# Patient Record
Sex: FEMALE | Race: WHITE | Employment: UNEMPLOYED | URBAN - METROPOLITAN AREA
[De-identification: names, ages, dates, MRNs, and addresses within clinical notes are randomized per-mention and may not be internally consistent; named-entity substitution may affect disease eponyms.]

---

## 2020-11-11 ENCOUNTER — TELEMEDICINE (OUTPATIENT)
Dept: INTERNAL MEDICINE CLINIC | Facility: CLINIC | Age: 35
End: 2020-11-11
Payer: COMMERCIAL

## 2020-11-11 DIAGNOSIS — J02.9 SORE THROAT: Primary | ICD-10-CM

## 2020-11-11 DIAGNOSIS — Z20.822 SUSPECTED COVID-19 VIRUS INFECTION: ICD-10-CM

## 2020-11-11 PROCEDURE — 99202 OFFICE O/P NEW SF 15 MIN: CPT | Performed by: INTERNAL MEDICINE

## 2020-11-11 NOTE — PROGRESS NOTES
Telehealth outside of [address] Verbal Consent     I conducted a telehealth visit with Yesika Hi today, 11/11/20, which was completed using two-way, real-time interactive audio and video communication.  This has been done in good katie to • Cough      Yes [x]     No []     Dry []     Productive []    • Dyspnea    Yes []   No [x]   Wheezing []   • Fatigue:   Yes []     No [x]     • Myalgias: Yes []     No [x]     • Chills:  Yes []    No [x]       • Sore throat:    Yes [x]      No []      • throat  - SARS-COV-2 BY PCR (); Future    2. Suspected COVID-19 virus infection  - SARS-COV-2 BY PCR ();  Future  Plan  Low likelihood of Covid given minimal contacts and only a sore throat, she tested negative for strep this morning at the minute advised to call office with any questions or seek emergency care if necessary. Patient understands and agrees to follow directions and advice.       Cameron Tam MD  Internal Medicine    ----------------------------------------- PATIENT INSTRUCTIONS-----

## 2020-12-07 ENCOUNTER — TELEMEDICINE (OUTPATIENT)
Dept: INTERNAL MEDICINE CLINIC | Facility: CLINIC | Age: 35
End: 2020-12-07

## 2020-12-07 DIAGNOSIS — H04.129 DRY EYE: Primary | ICD-10-CM

## 2020-12-07 DIAGNOSIS — M25.571 PAIN IN JOINTS OF BOTH FEET: ICD-10-CM

## 2020-12-07 DIAGNOSIS — M25.572 PAIN IN JOINTS OF BOTH FEET: ICD-10-CM

## 2020-12-07 DIAGNOSIS — R68.2 DRY MOUTH: ICD-10-CM

## 2020-12-07 PROCEDURE — 99214 OFFICE O/P EST MOD 30 MIN: CPT | Performed by: INTERNAL MEDICINE

## 2020-12-07 NOTE — PROGRESS NOTES
Telemedicine Visit for Respiratory Illness - Potential COVID-19 Infection    Virtual/Telephone Check-In    Biancacorby Xiao verbally consents to a Telephone Check-In service on 12/07/20.  Patient understands and accepts financial responsibility for any ded JOSEPH (AUTOMATED); Future  - CYCLIC CITRULLINATE PEP. IGG; Future  - SJOGREN'S ANTI-SS-A; Future  - SJOGREN'S ANTI-SS-B; Future    2. Dry mouth  - CBC WITH DIFFERENTIAL WITH PLATELET; Future  - COMP METABOLIC PANEL (14);  Future  - ASSAY, THYROID STIM isolation/social distancing and symptomatic treatment as outlined on CDC Patient Guidelines.   Hayden Means MD

## 2021-01-06 ENCOUNTER — LAB ENCOUNTER (OUTPATIENT)
Dept: LAB | Age: 36
End: 2021-01-06
Attending: INTERNAL MEDICINE
Payer: COMMERCIAL

## 2021-01-06 ENCOUNTER — TELEPHONE (OUTPATIENT)
Dept: INTERNAL MEDICINE CLINIC | Facility: CLINIC | Age: 36
End: 2021-01-06

## 2021-01-06 DIAGNOSIS — H04.129 DRY EYE: ICD-10-CM

## 2021-01-06 DIAGNOSIS — M25.571 PAIN IN JOINTS OF BOTH FEET: ICD-10-CM

## 2021-01-06 DIAGNOSIS — Z13.220 SCREENING CHOLESTEROL LEVEL: ICD-10-CM

## 2021-01-06 DIAGNOSIS — M25.572 PAIN IN JOINTS OF BOTH FEET: ICD-10-CM

## 2021-01-06 DIAGNOSIS — R68.2 DRY MOUTH: ICD-10-CM

## 2021-01-06 DIAGNOSIS — Z13.220 SCREENING CHOLESTEROL LEVEL: Primary | ICD-10-CM

## 2021-01-06 LAB
ALBUMIN SERPL-MCNC: 3.6 G/DL (ref 3.4–5)
ALBUMIN/GLOB SERPL: 0.9 {RATIO} (ref 1–2)
ALP LIVER SERPL-CCNC: 43 U/L
ALT SERPL-CCNC: 21 U/L
ANION GAP SERPL CALC-SCNC: 5 MMOL/L (ref 0–18)
AST SERPL-CCNC: 14 U/L (ref 15–37)
BASOPHILS # BLD AUTO: 0.06 X10(3) UL (ref 0–0.2)
BASOPHILS NFR BLD AUTO: 1.1 %
BILIRUB SERPL-MCNC: 0.5 MG/DL (ref 0.1–2)
BUN BLD-MCNC: 10 MG/DL (ref 7–18)
BUN/CREAT SERPL: 12.2 (ref 10–20)
CALCIUM BLD-MCNC: 9 MG/DL (ref 8.5–10.1)
CHLORIDE SERPL-SCNC: 107 MMOL/L (ref 98–112)
CHOLEST SMN-MCNC: 179 MG/DL (ref ?–200)
CO2 SERPL-SCNC: 27 MMOL/L (ref 21–32)
CREAT BLD-MCNC: 0.82 MG/DL
CRP SERPL-MCNC: <0.29 MG/DL (ref ?–0.3)
DEPRECATED RDW RBC AUTO: 40.8 FL (ref 35.1–46.3)
EOSINOPHIL # BLD AUTO: 0.1 X10(3) UL (ref 0–0.7)
EOSINOPHIL NFR BLD AUTO: 1.9 %
ERYTHROCYTE [DISTWIDTH] IN BLOOD BY AUTOMATED COUNT: 11.6 % (ref 11–15)
ERYTHROCYTE [SEDIMENTATION RATE] IN BLOOD: 11 MM/HR
GLOBULIN PLAS-MCNC: 3.9 G/DL (ref 2.8–4.4)
GLUCOSE BLD-MCNC: 86 MG/DL (ref 70–99)
HCT VFR BLD AUTO: 39.9 %
HDLC SERPL-MCNC: 60 MG/DL (ref 40–59)
HGB BLD-MCNC: 13.4 G/DL
IMM GRANULOCYTES # BLD AUTO: 0 X10(3) UL (ref 0–1)
IMM GRANULOCYTES NFR BLD: 0 %
LDLC SERPL CALC-MCNC: 102 MG/DL (ref ?–100)
LYMPHOCYTES # BLD AUTO: 2.22 X10(3) UL (ref 1–4)
LYMPHOCYTES NFR BLD AUTO: 41.7 %
M PROTEIN MFR SERPL ELPH: 7.5 G/DL (ref 6.4–8.2)
MCH RBC QN AUTO: 32.2 PG (ref 26–34)
MCHC RBC AUTO-ENTMCNC: 33.6 G/DL (ref 31–37)
MCV RBC AUTO: 95.9 FL
MONOCYTES # BLD AUTO: 0.43 X10(3) UL (ref 0.1–1)
MONOCYTES NFR BLD AUTO: 8.1 %
NEUTROPHILS # BLD AUTO: 2.52 X10 (3) UL (ref 1.5–7.7)
NEUTROPHILS # BLD AUTO: 2.52 X10(3) UL (ref 1.5–7.7)
NEUTROPHILS NFR BLD AUTO: 47.2 %
NONHDLC SERPL-MCNC: 119 MG/DL (ref ?–130)
OSMOLALITY SERPL CALC.SUM OF ELEC: 286 MOSM/KG (ref 275–295)
PATIENT FASTING Y/N/NP: YES
PLATELET # BLD AUTO: 205 10(3)UL (ref 150–450)
POTASSIUM SERPL-SCNC: 3.9 MMOL/L (ref 3.5–5.1)
RBC # BLD AUTO: 4.16 X10(6)UL
RHEUMATOID FACT SERPL-ACNC: <10 IU/ML (ref ?–15)
SODIUM SERPL-SCNC: 139 MMOL/L (ref 136–145)
TRIGL SERPL-MCNC: 86 MG/DL (ref 30–149)
TSI SER-ACNC: 2.94 MIU/ML (ref 0.36–3.74)
VLDLC SERPL CALC-MCNC: 17 MG/DL (ref 0–30)
WBC # BLD AUTO: 5.3 X10(3) UL (ref 4–11)

## 2021-01-06 PROCEDURE — 86038 ANTINUCLEAR ANTIBODIES: CPT

## 2021-01-06 PROCEDURE — 80061 LIPID PANEL: CPT

## 2021-01-06 PROCEDURE — 86235 NUCLEAR ANTIGEN ANTIBODY: CPT

## 2021-01-06 PROCEDURE — 86140 C-REACTIVE PROTEIN: CPT

## 2021-01-06 PROCEDURE — 85652 RBC SED RATE AUTOMATED: CPT

## 2021-01-06 PROCEDURE — 85025 COMPLETE CBC W/AUTO DIFF WBC: CPT

## 2021-01-06 PROCEDURE — 36415 COLL VENOUS BLD VENIPUNCTURE: CPT

## 2021-01-06 PROCEDURE — 86431 RHEUMATOID FACTOR QUANT: CPT

## 2021-01-06 PROCEDURE — 80053 COMPREHEN METABOLIC PANEL: CPT

## 2021-01-06 PROCEDURE — 86200 CCP ANTIBODY: CPT

## 2021-01-06 PROCEDURE — 84443 ASSAY THYROID STIM HORMONE: CPT

## 2021-01-06 NOTE — TELEPHONE ENCOUNTER
Pt asking if she can have cholesterol panel added to her labs. Please advise.     Nurses-  Pt is having labs done today, please contact lab to add lipid panel to specimen if approved by MD.

## 2021-01-06 NOTE — TELEPHONE ENCOUNTER
Left message asking patient if she was fasting this morning when she had her labs.  If so we can call the lab and see if we can add the lipid panel

## 2021-01-07 LAB — NUCLEAR IGG TITR SER IF: NEGATIVE {TITER}

## 2021-01-11 ENCOUNTER — PATIENT MESSAGE (OUTPATIENT)
Dept: INTERNAL MEDICINE CLINIC | Facility: CLINIC | Age: 36
End: 2021-01-11

## 2021-01-12 LAB
CCP IGG SERPL-ACNC: 0.4 U/ML (ref 0–6.9)
ENA SS-A AB SER QL IA: NEGATIVE
ENA SS-B AB SER QL IA: NEGATIVE

## 2021-01-12 NOTE — TELEPHONE ENCOUNTER
From: Becka Pascual  To: Lisa Rodriguez MD  Sent: 1/11/2021 6:02 PM CST  Subject: Non-Urgent Medical Question    Hi Dr. Calvin Jefferson-  I saw your note and glad all is ok. I am however having a recurrence of GERD.  I've had issues off and on for years and it's

## 2021-01-19 ENCOUNTER — OFFICE VISIT (OUTPATIENT)
Dept: INTERNAL MEDICINE CLINIC | Facility: CLINIC | Age: 36
End: 2021-01-19
Payer: COMMERCIAL

## 2021-01-19 VITALS
DIASTOLIC BLOOD PRESSURE: 74 MMHG | SYSTOLIC BLOOD PRESSURE: 119 MMHG | WEIGHT: 129 LBS | BODY MASS INDEX: 20.73 KG/M2 | HEART RATE: 65 BPM | RESPIRATION RATE: 17 BRPM | HEIGHT: 66 IN

## 2021-01-19 DIAGNOSIS — Z00.00 PHYSICAL EXAM, ANNUAL: Primary | ICD-10-CM

## 2021-01-19 PROCEDURE — 3078F DIAST BP <80 MM HG: CPT | Performed by: INTERNAL MEDICINE

## 2021-01-19 PROCEDURE — 3008F BODY MASS INDEX DOCD: CPT | Performed by: INTERNAL MEDICINE

## 2021-01-19 PROCEDURE — 99395 PREV VISIT EST AGE 18-39: CPT | Performed by: INTERNAL MEDICINE

## 2021-01-19 PROCEDURE — 3074F SYST BP LT 130 MM HG: CPT | Performed by: INTERNAL MEDICINE

## 2021-01-19 RX ORDER — OMEPRAZOLE 20 MG/1
CAPSULE, DELAYED RELEASE ORAL
COMMUNITY
Start: 2021-01-13 | End: 2021-10-22 | Stop reason: ALTCHOICE

## 2021-01-19 NOTE — PROGRESS NOTES
Mark Alcantar is a 38year old female.   Patient presents with:  Physical      HPI:   Patient comes for physical  C/C physical  C/o has forms that need to be filled for her insurance  dry eyes from environmental - 10 yo son saw saw bertrand Dorchristianaea Son adult)   Pulse 65   Resp 17   Ht 5' 6\" (1.676 m)   Wt 129 lb (58.5 kg)   BMI 20.82 kg/m²   GENERAL: well developed, well nourished,in no apparent distress  SKIN: no rashes,no suspicious lesions  HEENT: atraumatic, normocephalic,ears and throat are clear,

## 2021-01-26 ENCOUNTER — PATIENT MESSAGE (OUTPATIENT)
Dept: INTERNAL MEDICINE CLINIC | Facility: CLINIC | Age: 36
End: 2021-01-26

## 2021-01-26 DIAGNOSIS — U07.1 COVID-19: Primary | ICD-10-CM

## 2021-01-26 NOTE — TELEPHONE ENCOUNTER
DR Dorsey=se message, pended order, please check if this is the right order and dx code before signing. From: Blue Lainez  To:  Carolyne Rondon MD  Sent: 1/26/2021 11:32 AM CST  Subject: Non-Urgent Medical Question    Hi Dr Dinora Mcdonnell,  Is there any wa

## 2021-02-01 ENCOUNTER — LAB ENCOUNTER (OUTPATIENT)
Dept: LAB | Age: 36
End: 2021-02-01
Attending: INTERNAL MEDICINE
Payer: COMMERCIAL

## 2021-02-01 DIAGNOSIS — U07.1 COVID-19: ICD-10-CM

## 2021-02-01 LAB — SARS-COV-2 IGG+IGM SERPL QL IA: NONREACTIVE

## 2021-02-01 PROCEDURE — 86769 SARS-COV-2 COVID-19 ANTIBODY: CPT

## 2021-02-01 PROCEDURE — 36415 COLL VENOUS BLD VENIPUNCTURE: CPT

## 2021-04-05 ENCOUNTER — PATIENT MESSAGE (OUTPATIENT)
Dept: INTERNAL MEDICINE CLINIC | Facility: CLINIC | Age: 36
End: 2021-04-05

## 2021-04-05 DIAGNOSIS — Z20.822 EXPOSURE TO COVID-19 VIRUS: Primary | ICD-10-CM

## 2021-04-05 NOTE — TELEPHONE ENCOUNTER
Call patient and left message–wanted to know if she had symptoms,   Ordered as asymptomatic and exposure to covid -- let her know the covid team will call her and she can request lisle if possible

## 2021-04-05 NOTE — TELEPHONE ENCOUNTER
Patient called stated would like order placed for Covid Testing dtg is (+)    Order pend for your approval

## 2021-06-23 NOTE — LETTER
11/16/2023      Hemant Das MD  Physical Medicine and Rehabilitation  [facility], [address] [place] [address]  Dept: [phone]  Dept Fax: [phone]        RE: Consultation for [facility]        Dear Bhumi Petty MD,    Thank you very much for the opportunity to see your patient. Attached please find a summary from your patient's recent visit. I appreciate the chance to take care of your patient with you. Please feel free to call me with any questions or concerns. Sincerely,        Dorean Fothergill.  MD Jeff  Electronically Signed on 11/16/2023 no

## 2021-08-10 ENCOUNTER — TELEMEDICINE (OUTPATIENT)
Dept: INTERNAL MEDICINE CLINIC | Facility: CLINIC | Age: 36
End: 2021-08-10
Payer: COMMERCIAL

## 2021-08-10 DIAGNOSIS — F41.9 ANXIETY AND DEPRESSION: ICD-10-CM

## 2021-08-10 DIAGNOSIS — F32.A ANXIETY AND DEPRESSION: ICD-10-CM

## 2021-08-10 DIAGNOSIS — D22.9 BENIGN MOLE: ICD-10-CM

## 2021-08-10 DIAGNOSIS — R53.83 FATIGUE, UNSPECIFIED TYPE: Primary | ICD-10-CM

## 2021-08-10 PROCEDURE — 99213 OFFICE O/P EST LOW 20 MIN: CPT | Performed by: INTERNAL MEDICINE

## 2021-08-10 NOTE — PROGRESS NOTES
Patient ID: Seth Brock is a 39year old female. No chief complaint on file. HISTORY OF PRESENT ILLNESS:   Patient presents for above. This visit is conducted using Telemedicine with live, interactive video and audio.   C/c \" brain fog \" Never Smoker      Smokeless tobacco: Never Used      Tobacco comment: social in college     Vaping Use      Vaping Use: Never used    Substance and Sexual Activity      Alcohol use: Never      Drug use: Never      Sexual activity: Not on file    Other Topi ASSAY, THYROID STIM HORMONE; Future  -  NAVIGATOR  +  Anxiety >> depression  She is very sensitive so we will hold off on medications–refer to [facility] at least to speak to a therapist  Did have very long discussion with patient and verbally agreed to telehealth consent form, related consents and the risks discussed. Lastly, the patient confirmed that [place] in [place].    Included in this visit, time may have been spent reviewing labs, medications, radiology tests and decision

## 2021-08-18 ENCOUNTER — LAB ENCOUNTER (OUTPATIENT)
Dept: LAB | Facility: REFERENCE LAB | Age: 36
End: 2021-08-18
Attending: INTERNAL MEDICINE
Payer: COMMERCIAL

## 2021-08-18 DIAGNOSIS — R53.83 FATIGUE, UNSPECIFIED TYPE: ICD-10-CM

## 2021-08-18 DIAGNOSIS — F41.9 ANXIETY AND DEPRESSION: ICD-10-CM

## 2021-08-18 DIAGNOSIS — F32.A ANXIETY AND DEPRESSION: ICD-10-CM

## 2021-08-18 LAB
ALBUMIN SERPL-MCNC: 3.7 G/DL (ref 3.4–5)
ALBUMIN/GLOB SERPL: 1 {RATIO} (ref 1–2)
ALP LIVER SERPL-CCNC: 45 U/L
ALT SERPL-CCNC: 21 U/L
ANION GAP SERPL CALC-SCNC: 3 MMOL/L (ref 0–18)
AST SERPL-CCNC: 11 U/L (ref 15–37)
BILIRUB SERPL-MCNC: 0.4 MG/DL (ref 0.1–2)
BUN BLD-MCNC: 10 MG/DL (ref 7–18)
BUN/CREAT SERPL: 15.4 (ref 10–20)
CALCIUM BLD-MCNC: 8.8 MG/DL (ref 8.5–10.1)
CHLORIDE SERPL-SCNC: 106 MMOL/L (ref 98–112)
CO2 SERPL-SCNC: 29 MMOL/L (ref 21–32)
CREAT BLD-MCNC: 0.65 MG/DL
DEPRECATED RDW RBC AUTO: 40 FL (ref 35.1–46.3)
ERYTHROCYTE [DISTWIDTH] IN BLOOD BY AUTOMATED COUNT: 11.2 % (ref 11–15)
GLOBULIN PLAS-MCNC: 3.8 G/DL (ref 2.8–4.4)
GLUCOSE BLD-MCNC: 100 MG/DL (ref 70–99)
HCT VFR BLD AUTO: 40.1 %
HGB BLD-MCNC: 13.5 G/DL
M PROTEIN MFR SERPL ELPH: 7.5 G/DL (ref 6.4–8.2)
MCH RBC QN AUTO: 32.6 PG (ref 26–34)
MCHC RBC AUTO-ENTMCNC: 33.7 G/DL (ref 31–37)
MCV RBC AUTO: 96.9 FL
OSMOLALITY SERPL CALC.SUM OF ELEC: 285 MOSM/KG (ref 275–295)
PATIENT FASTING Y/N/NP: NO
PLATELET # BLD AUTO: 197 10(3)UL (ref 150–450)
POTASSIUM SERPL-SCNC: 3.8 MMOL/L (ref 3.5–5.1)
RBC # BLD AUTO: 4.14 X10(6)UL
SODIUM SERPL-SCNC: 138 MMOL/L (ref 136–145)
TSI SER-ACNC: 1.77 MIU/ML (ref 0.36–3.74)
WBC # BLD AUTO: 6.2 X10(3) UL (ref 4–11)

## 2021-08-18 PROCEDURE — 36415 COLL VENOUS BLD VENIPUNCTURE: CPT

## 2021-08-18 PROCEDURE — 80053 COMPREHEN METABOLIC PANEL: CPT

## 2021-08-18 PROCEDURE — 85027 COMPLETE CBC AUTOMATED: CPT

## 2021-08-18 PROCEDURE — 84443 ASSAY THYROID STIM HORMONE: CPT

## 2021-09-20 ENCOUNTER — PATIENT MESSAGE (OUTPATIENT)
Dept: INTERNAL MEDICINE CLINIC | Facility: CLINIC | Age: 36
End: 2021-09-20

## 2021-09-20 DIAGNOSIS — Z91.89 AT INCREASED RISK OF EXPOSURE TO COVID-19 VIRUS: Primary | ICD-10-CM

## 2021-09-21 NOTE — TELEPHONE ENCOUNTER
Signed pended order but if she has received the Covid vaccine then it could be reactive because of that  She should still check with her insurance to see if this is covered

## 2021-10-21 ENCOUNTER — NURSE TRIAGE (OUTPATIENT)
Dept: INTERNAL MEDICINE CLINIC | Facility: CLINIC | Age: 36
End: 2021-10-21

## 2021-10-21 NOTE — TELEPHONE ENCOUNTER
Action Requested: Summary for Provider     []  Critical Lab, Recommendations Needed  [] Need Additional Advice  []   FYI    []   Need Orders  [] Need Medications Sent to Pharmacy  []  Other       SUMMARY: Per protocol office visit scheduled    Future Appoi

## 2021-10-21 NOTE — TELEPHONE ENCOUNTER
Patient scheduled an appointment online. Please see below     Visit Type: [employer] VIDEO VISIT (4145)      10/26/2021   1:30 PM  20 mins. Ana Valles MD         [facility]      Patient Comments:   Persistent left side abdominal pain.  3-4 months

## 2021-10-22 ENCOUNTER — OFFICE VISIT (OUTPATIENT)
Dept: FAMILY MEDICINE CLINIC | Facility: CLINIC | Age: 36
End: 2021-10-22
Payer: COMMERCIAL

## 2021-10-22 VITALS
DIASTOLIC BLOOD PRESSURE: 69 MMHG | SYSTOLIC BLOOD PRESSURE: 104 MMHG | BODY MASS INDEX: 20.82 KG/M2 | HEIGHT: 65.6 IN | RESPIRATION RATE: 18 BRPM | HEART RATE: 75 BPM | WEIGHT: 128 LBS | TEMPERATURE: 98 F

## 2021-10-22 DIAGNOSIS — Z91.018 FOOD ALLERGY: Primary | ICD-10-CM

## 2021-10-22 DIAGNOSIS — R10.32 LEFT LOWER QUADRANT ABDOMINAL PAIN: ICD-10-CM

## 2021-10-22 PROCEDURE — 3008F BODY MASS INDEX DOCD: CPT | Performed by: NURSE PRACTITIONER

## 2021-10-22 PROCEDURE — 3074F SYST BP LT 130 MM HG: CPT | Performed by: NURSE PRACTITIONER

## 2021-10-22 PROCEDURE — 99213 OFFICE O/P EST LOW 20 MIN: CPT | Performed by: NURSE PRACTITIONER

## 2021-10-22 PROCEDURE — 3078F DIAST BP <80 MM HG: CPT | Performed by: NURSE PRACTITIONER

## 2021-10-22 NOTE — PROGRESS NOTES
Raj Jacobs is a 39year old female. Patient presents with:  Abdominal Pain: upper mid stomach pain    HPI:   Patient presents to the clinic with mid left abdominal pain.  Patient states she has had the pain for years but now it is becoming more freque Musculoskeletal: Negative. Skin: Negative. Neurological: Negative. Psychiatric/Behavioral: Negative. Wt Readings from Last 5 Encounters:  10/22/21 : 128 lb (58.1 kg)  01/19/21 : 129 lb (58.5 kg)    Body mass index is 20.91 kg/m².       EXAM

## 2021-10-25 ENCOUNTER — TELEPHONE (OUTPATIENT)
Dept: CASE MANAGEMENT | Age: 36
End: 2021-10-25

## 2021-10-25 NOTE — TELEPHONE ENCOUNTER
Hello~    [facility] is out of network provider for pt to have CT. Please have pt reach out to health plan to verify who is in network.     Thank you,  [facility]  Referral specialist

## 2021-10-26 NOTE — TELEPHONE ENCOUNTER
Please contact patient and have them reach out to their insurance to verify who is in network for a CT scan.

## 2021-10-27 NOTE — TELEPHONE ENCOUNTER
Hello ~    Pt is still scheduled. Please reach out to patient to find out who is in network.     Thank you,    [facility]   Referral specialist

## 2021-10-30 ENCOUNTER — PATIENT MESSAGE (OUTPATIENT)
Dept: FAMILY MEDICINE CLINIC | Facility: CLINIC | Age: 36
End: 2021-10-30

## 2021-10-30 ENCOUNTER — HOSPITAL ENCOUNTER (OUTPATIENT)
Dept: CT IMAGING | Facility: HOSPITAL | Age: 36
Discharge: HOME | End: 2021-10-30
Attending: NURSE PRACTITIONER
Payer: COMMERCIAL

## 2021-10-30 DIAGNOSIS — N13.70 VESICOURETERAL REFLUX: ICD-10-CM

## 2021-10-30 DIAGNOSIS — R10.32 LEFT LOWER QUADRANT ABDOMINAL PAIN: ICD-10-CM

## 2021-10-30 DIAGNOSIS — K76.0 FATTY LIVER: Primary | ICD-10-CM

## 2021-10-30 PROCEDURE — 82565 ASSAY OF CREATININE: CPT

## 2021-10-30 PROCEDURE — 74177 CT ABD & PELVIS W/CONTRAST: CPT | Performed by: NURSE PRACTITIONER

## 2021-10-31 NOTE — TELEPHONE ENCOUNTER
From: Jones Yang  To: RAVINDRA Bro  Sent: 10/30/2021 3:33 PM CDT  Subject: CT Scan Results    Gerardo Bryant,  I did my CT scan today and received results.  They’re freaking me out a little as some ureter thickening etc was seen as well as a

## 2021-11-01 NOTE — TELEPHONE ENCOUNTER
Called ans spoke to pt at length  Ordered UA  Reviewed labs again from August  These are chronic issues that patient has been having and to try to reassure patient but she is concerned so will refer to urology, check liver ultrasound  Her dad does have kid

## 2021-11-08 ENCOUNTER — LAB ENCOUNTER (OUTPATIENT)
Dept: LAB | Age: 36
End: 2021-11-08
Attending: INTERNAL MEDICINE
Payer: COMMERCIAL

## 2021-11-08 DIAGNOSIS — N13.70 VESICOURETERAL REFLUX: ICD-10-CM

## 2021-11-08 PROCEDURE — 81003 URINALYSIS AUTO W/O SCOPE: CPT

## 2021-11-10 ENCOUNTER — OFFICE VISIT (OUTPATIENT)
Dept: ALLERGY | Facility: CLINIC | Age: 36
End: 2021-11-10
Payer: COMMERCIAL

## 2021-11-10 ENCOUNTER — NURSE ONLY (OUTPATIENT)
Dept: ALLERGY | Facility: CLINIC | Age: 36
End: 2021-11-10
Payer: COMMERCIAL

## 2021-11-10 VITALS
DIASTOLIC BLOOD PRESSURE: 74 MMHG | WEIGHT: 128 LBS | HEART RATE: 69 BPM | OXYGEN SATURATION: 100 % | HEIGHT: 65 IN | BODY MASS INDEX: 21.33 KG/M2 | SYSTOLIC BLOOD PRESSURE: 107 MMHG

## 2021-11-10 DIAGNOSIS — Z91.018 FOOD ALLERGY: Primary | ICD-10-CM

## 2021-11-10 DIAGNOSIS — R10.30 LOWER ABDOMINAL PAIN: ICD-10-CM

## 2021-11-10 DIAGNOSIS — K90.49 ALCOHOL INTOLERANCE: ICD-10-CM

## 2021-11-10 DIAGNOSIS — J30.1 SEASONAL ALLERGIC RHINITIS DUE TO POLLEN: ICD-10-CM

## 2021-11-10 DIAGNOSIS — Z91.018 FOOD ALLERGY: ICD-10-CM

## 2021-11-10 PROCEDURE — 3008F BODY MASS INDEX DOCD: CPT | Performed by: ALLERGY & IMMUNOLOGY

## 2021-11-10 PROCEDURE — 3078F DIAST BP <80 MM HG: CPT | Performed by: ALLERGY & IMMUNOLOGY

## 2021-11-10 PROCEDURE — 99244 OFF/OP CNSLTJ NEW/EST MOD 40: CPT | Performed by: ALLERGY & IMMUNOLOGY

## 2021-11-10 PROCEDURE — 3074F SYST BP LT 130 MM HG: CPT | Performed by: ALLERGY & IMMUNOLOGY

## 2021-11-10 PROCEDURE — 95004 PERQ TESTS W/ALRGNC XTRCS: CPT | Performed by: ALLERGY & IMMUNOLOGY

## 2021-11-10 RX ORDER — LEVOCETIRIZINE DIHYDROCHLORIDE 5 MG/1
5 TABLET, FILM COATED ORAL NIGHTLY
Qty: 30 TABLET | Refills: 0 | Status: SHIPPED | OUTPATIENT
Start: 2021-11-10

## 2021-11-10 NOTE — PATIENT INSTRUCTIONS
1.  Adverse food reaction  Food allergy versus food intolerance. Symptoms can be triggered with apples and bananas and almond butter 3 to 4 hours after ingestion. More GI issues including abdominal pain bloating gas. No diarrhea no emesis.   Patient has

## 2021-11-10 NOTE — PROGRESS NOTES
Blue Lainez is a 39year old female. HPI:   Patient presents with: Other: patient presents with left sided abdominal pain , thinks eating apples and bananas are causing the pain and bloating, denies hives when eating said fruit.  patient states sh ad, or food allergy:   Hx of eib    + AR: seasonal. Allegra+/- d prn     covid vaccin utd x 2 doses  Flu vaccine this fall:  Yes       Dad with barretts         CT of the abdomen on October 30, 2021 per report  Impression   CONCLUSION:   1.  Nonspecific ur Reaction: rash  Sulfamethoxazole W/*    HIVES  Kiwi Extract            ITCHING      ROS:     Allergic/Immuno:  See HPI  Cardiovascular:  Negative for irregular heartbeat/palpitations, chest pain, edema  Constitutional:  Negative night sweats,weight loss, i Allmond walnut peanut barley rye hops apples and bananas was negative     Patient with positive response to histamine control     1. Adverse food reaction  Food allergy versus food intolerance.   Symptoms can be triggered with apples and bananas and almond report         Orders This Visit:  Orders Placed This Encounter      CELIAC DISEASE SCREEN Reflex      Meds This Visit:  Requested Prescriptions      No prescriptions requested or ordered in this encounter       Imaging & Referrals:  None     11/10/2021  J

## 2021-11-18 ENCOUNTER — MED REC SCAN ONLY (OUTPATIENT)
Dept: INTERNAL MEDICINE CLINIC | Facility: CLINIC | Age: 36
End: 2021-11-18

## 2021-11-18 ENCOUNTER — OFFICE VISIT (OUTPATIENT)
Dept: SURGERY | Facility: CLINIC | Age: 36
End: 2021-11-18
Payer: COMMERCIAL

## 2021-11-18 VITALS
BODY MASS INDEX: 21.33 KG/M2 | HEIGHT: 65 IN | WEIGHT: 128 LBS | DIASTOLIC BLOOD PRESSURE: 77 MMHG | SYSTOLIC BLOOD PRESSURE: 120 MMHG | HEART RATE: 68 BPM

## 2021-11-18 DIAGNOSIS — N13.4 HYDROURETER ON LEFT: Primary | ICD-10-CM

## 2021-11-18 PROCEDURE — 3008F BODY MASS INDEX DOCD: CPT | Performed by: UROLOGY

## 2021-11-18 PROCEDURE — 3074F SYST BP LT 130 MM HG: CPT | Performed by: UROLOGY

## 2021-11-18 PROCEDURE — 99244 OFF/OP CNSLTJ NEW/EST MOD 40: CPT | Performed by: UROLOGY

## 2021-11-18 PROCEDURE — 3078F DIAST BP <80 MM HG: CPT | Performed by: UROLOGY

## 2021-11-21 ENCOUNTER — IMMUNIZATION (OUTPATIENT)
Dept: LAB | Facility: HOSPITAL | Age: 36
End: 2021-11-21
Attending: EMERGENCY MEDICINE
Payer: COMMERCIAL

## 2021-11-21 DIAGNOSIS — Z23 NEED FOR VACCINATION: Primary | ICD-10-CM

## 2021-11-21 PROCEDURE — 0004A SARSCOV2 VAC 30MCG/0.3ML IM: CPT

## 2021-12-02 NOTE — H&P
[address] [facility] [address] Gastroenterology                                                                                                  Clinic History and Physical     Pa reveal any acute dilatation. CT urogram as ordered by urology revealed a probable nonspecific 3.5 feet hyperenhancement on the left hepatic lobe with follow-up MRI recommended.     The patient has followed up with Dr. Rajinder Castro [facility] hepatology) who she saw y HIVES, OTHER (SEE COMMENTS)    Comment:Cerner Allergy Text Annotation: sulfa drugs,             Cerner Reaction: rash  Sulfamethoxazole W/*    HIVES  Kiwi Extract            ITCHING    ROS:   CONSTITUTIONAL:  negative for fevers, rigors  EYES:  negative fo abdominal pain        1. Left-sided abdominal pain: The patient endorses a recent history of intermittent left-sided abdominal pain. There is relief if she avoids certain foods such as apples and bananas and/or passes gas/has a bowel movement.   CT scan h MiraLAX 1-2 scoops daily and titrate dose as needed  -Follow-up with new or ongoing GI symptoms      Orders This Visit:  No orders of the defined types were placed in this encounter.       Meds This Visit:  Requested Prescriptions      No prescriptions requ

## 2021-12-13 ENCOUNTER — HOSPITAL ENCOUNTER (OUTPATIENT)
Dept: CT IMAGING | Facility: HOSPITAL | Age: 36
Discharge: HOME | End: 2021-12-13
Attending: UROLOGY
Payer: COMMERCIAL

## 2021-12-13 DIAGNOSIS — N13.4 HYDROURETER ON LEFT: ICD-10-CM

## 2021-12-13 PROCEDURE — 74178 CT ABD&PLV WO CNTR FLWD CNTR: CPT | Performed by: UROLOGY

## 2021-12-13 PROCEDURE — 76377 3D RENDER W/INTRP POSTPROCES: CPT | Performed by: UROLOGY

## 2021-12-13 PROCEDURE — 82565 ASSAY OF CREATININE: CPT

## 2021-12-14 ENCOUNTER — OFFICE VISIT (OUTPATIENT)
Dept: SURGERY | Facility: CLINIC | Age: 36
End: 2021-12-14
Payer: COMMERCIAL

## 2021-12-14 DIAGNOSIS — N13.4 HYDROURETER ON LEFT: Primary | ICD-10-CM

## 2021-12-14 PROCEDURE — 99213 OFFICE O/P EST LOW 20 MIN: CPT | Performed by: UROLOGY

## 2021-12-14 RX ORDER — HYDROCORTISONE 25 MG/G
CREAM TOPICAL
COMMUNITY
Start: 2021-12-14

## 2021-12-14 NOTE — PROGRESS NOTES
Maryam Davalos is a 39year old female.     HPI:   Patient presents with:  Urinary Symptoms: pt is here 1 month follow up ct scan completed       14-year-old female in follow-up to previous visit November 18, 2021 for incidental left hydroureter detected diagnosis)    Reviewed findings at length with patient. Recommended follow-up in 4 months to recheck urine microscopically for blood. If negative and has no recurring symptoms can follow-up on a as needed basis subsequent to that.          Orders This Visit

## 2021-12-16 ENCOUNTER — OFFICE VISIT (OUTPATIENT)
Dept: GASTROENTEROLOGY | Facility: CLINIC | Age: 36
End: 2021-12-16
Payer: COMMERCIAL

## 2021-12-16 VITALS
BODY MASS INDEX: 21.16 KG/M2 | HEIGHT: 65 IN | TEMPERATURE: 99 F | HEART RATE: 77 BPM | DIASTOLIC BLOOD PRESSURE: 70 MMHG | SYSTOLIC BLOOD PRESSURE: 101 MMHG | WEIGHT: 127 LBS

## 2021-12-16 DIAGNOSIS — R10.9 LEFT SIDED ABDOMINAL PAIN: Primary | ICD-10-CM

## 2021-12-16 DIAGNOSIS — K59.09 CHRONIC CONSTIPATION: ICD-10-CM

## 2021-12-16 DIAGNOSIS — R93.5 ABNORMAL ABDOMINAL CT SCAN: ICD-10-CM

## 2021-12-16 PROCEDURE — 3074F SYST BP LT 130 MM HG: CPT | Performed by: NURSE PRACTITIONER

## 2021-12-16 PROCEDURE — 3078F DIAST BP <80 MM HG: CPT | Performed by: NURSE PRACTITIONER

## 2021-12-16 PROCEDURE — 3008F BODY MASS INDEX DOCD: CPT | Performed by: NURSE PRACTITIONER

## 2021-12-16 PROCEDURE — 99244 OFF/OP CNSLTJ NEW/EST MOD 40: CPT | Performed by: NURSE PRACTITIONER

## 2021-12-16 NOTE — PATIENT INSTRUCTIONS
-Keep up good water/fiber intake  -Start probiotics  -Start MiraLAX 1-2 scoops daily and titrate dose as needed  -Follow-up with new or ongoing GI symptoms

## 2022-01-21 ENCOUNTER — TELEMEDICINE (OUTPATIENT)
Dept: INTERNAL MEDICINE CLINIC | Facility: CLINIC | Age: 37
End: 2022-01-21

## 2022-01-21 ENCOUNTER — NURSE TRIAGE (OUTPATIENT)
Dept: INTERNAL MEDICINE CLINIC | Facility: CLINIC | Age: 37
End: 2022-01-21

## 2022-01-21 DIAGNOSIS — R05.9 COUGH: Primary | ICD-10-CM

## 2022-01-21 PROCEDURE — 99213 OFFICE O/P EST LOW 20 MIN: CPT | Performed by: INTERNAL MEDICINE

## 2022-01-21 RX ORDER — FLUTICASONE FUROATE AND VILANTEROL TRIFENATATE 100; 25 UG/1; UG/1
1 POWDER RESPIRATORY (INHALATION) DAILY
Qty: 1 EACH | Refills: 0 | Status: SHIPPED | OUTPATIENT
Start: 2022-01-21

## 2022-01-21 NOTE — TELEPHONE ENCOUNTER
Action Requested: Summary for Provider     []  Critical Lab, Recommendations Needed  [] Need Additional Advice  [x]   FYI    []   Need Orders  [] Need Medications Sent to Pharmacy  []  Other     SUMMARY:   Spoke with pt,  verified, pt started getting si

## 2022-01-24 NOTE — PROGRESS NOTES
Virtual Virtual Check-In    Araceli Dominguez verbally consents to a Virtual Video Check-In service on 01/24/22. Patient understands and accepts financial responsibility for any deductible, co-insurance and/or co-pays associated with this service.  This vis into the lungs daily. 1 each 0   • hydrocortisone 2.5 % External Cream  (Patient not taking: Reported on 12/16/2021)     • levocetirizine (XYZAL) 5 MG Oral Tab Take 1 tablet (5 mg total) by mouth nightly.  (Patient not taking: Reported on 12/16/2021) 30 tab emergency care. Patient advised to go to ER or call 911 for worsening symptoms or acute distress. This note was prepared using Vocab voice recognition dictation software. As a result errors may occur.  When identified these errors have been cor

## 2022-02-04 ENCOUNTER — TELEPHONE (OUTPATIENT)
Dept: INTERNAL MEDICINE CLINIC | Facility: CLINIC | Age: 37
End: 2022-02-04

## 2022-02-04 RX ORDER — BENZONATATE 100 MG/1
100 CAPSULE ORAL 2 TIMES DAILY PRN
Qty: 10 CAPSULE | Refills: 0 | Status: SHIPPED | OUTPATIENT
Start: 2022-02-04 | End: 2022-02-22 | Stop reason: ALTCHOICE

## 2022-02-04 NOTE — TELEPHONE ENCOUNTER
Patient states she is still coughing and when prescribed Breo she only took it for one day 1/21/22 and stopped. Patient reports she did take the Breo this morning. .    Patient states cough is dry denies any other s/sx. Patient scheduled an office visit for 2/22/22. Please advise.

## 2022-02-08 ENCOUNTER — TELEPHONE (OUTPATIENT)
Dept: INTERNAL MEDICINE CLINIC | Facility: CLINIC | Age: 37
End: 2022-02-08

## 2022-02-08 NOTE — TELEPHONE ENCOUNTER
Pharmacy asking for an alternative medication to [facility] RESPICLICK 90 mcg inhlr Inhale 2 puffs into the lings every 4 to 6 hours as needed qty 1.0    Could not recognize if that matched any of the current meds.

## 2022-02-09 RX ORDER — ALBUTEROL SULFATE 90 UG/1
2 AEROSOL, METERED RESPIRATORY (INHALATION) EVERY 6 HOURS PRN
Qty: 1 EACH | Refills: 0 | Status: SHIPPED | OUTPATIENT
Start: 2022-02-09 | End: 2022-02-22 | Stop reason: ALTCHOICE

## 2022-02-09 NOTE — TELEPHONE ENCOUNTER
proair is not covered. Albuterol is covered by insurance. Ok to change to albuterol? Please advise. Albuterol powder is not covered and is not preferred.

## 2022-02-22 ENCOUNTER — LAB ENCOUNTER (OUTPATIENT)
Dept: LAB | Age: 37
End: 2022-02-22
Attending: INTERNAL MEDICINE
Payer: COMMERCIAL

## 2022-02-22 ENCOUNTER — PATIENT MESSAGE (OUTPATIENT)
Dept: INTERNAL MEDICINE CLINIC | Facility: CLINIC | Age: 37
End: 2022-02-22

## 2022-02-22 ENCOUNTER — OFFICE VISIT (OUTPATIENT)
Dept: INTERNAL MEDICINE CLINIC | Facility: CLINIC | Age: 37
End: 2022-02-22
Payer: COMMERCIAL

## 2022-02-22 VITALS
HEIGHT: 65 IN | SYSTOLIC BLOOD PRESSURE: 105 MMHG | WEIGHT: 127.38 LBS | BODY MASS INDEX: 21.22 KG/M2 | HEART RATE: 93 BPM | DIASTOLIC BLOOD PRESSURE: 73 MMHG

## 2022-02-22 DIAGNOSIS — Z00.00 PHYSICAL EXAM, ANNUAL: ICD-10-CM

## 2022-02-22 DIAGNOSIS — Z00.00 PHYSICAL EXAM, ANNUAL: Primary | ICD-10-CM

## 2022-02-22 PROBLEM — F90.0 ATTENTION DEFICIT HYPERACTIVITY DISORDER (ADHD), PREDOMINANTLY INATTENTIVE TYPE: Status: ACTIVE | Noted: 2022-02-22

## 2022-02-22 LAB
BASOPHILS # BLD AUTO: 0.04 X10(3) UL (ref 0–0.2)
BASOPHILS NFR BLD AUTO: 0.8 %
CHOLEST SERPL-MCNC: 174 MG/DL (ref ?–200)
CRP SERPL-MCNC: <0.29 MG/DL (ref ?–0.3)
DEPRECATED RDW RBC AUTO: 41.5 FL (ref 35.1–46.3)
EOSINOPHIL # BLD AUTO: 0.06 X10(3) UL (ref 0–0.7)
EOSINOPHIL NFR BLD AUTO: 1.2 %
ERYTHROCYTE [DISTWIDTH] IN BLOOD BY AUTOMATED COUNT: 11.6 % (ref 11–15)
FASTING PATIENT LIPID ANSWER: YES
HCT VFR BLD AUTO: 41.3 %
HCYS SERPL-SCNC: 5.8 UMOL/L (ref 3.2–10.7)
HDLC SERPL-MCNC: 61 MG/DL (ref 40–59)
HGB BLD-MCNC: 13.8 G/DL
IMM GRANULOCYTES # BLD AUTO: 0.01 X10(3) UL (ref 0–1)
IMM GRANULOCYTES NFR BLD: 0.2 %
LDLC SERPL CALC-MCNC: 102 MG/DL (ref ?–100)
LYMPHOCYTES # BLD AUTO: 2.04 X10(3) UL (ref 1–4)
LYMPHOCYTES NFR BLD AUTO: 40.8 %
MCH RBC QN AUTO: 32.5 PG (ref 26–34)
MCHC RBC AUTO-ENTMCNC: 33.4 G/DL (ref 31–37)
MCV RBC AUTO: 97.2 FL
MONOCYTES # BLD AUTO: 0.43 X10(3) UL (ref 0.1–1)
MONOCYTES NFR BLD AUTO: 8.6 %
NEUTROPHILS # BLD AUTO: 2.42 X10 (3) UL (ref 1.5–7.7)
NEUTROPHILS # BLD AUTO: 2.42 X10(3) UL (ref 1.5–7.7)
NEUTROPHILS NFR BLD AUTO: 48.4 %
NONHDLC SERPL-MCNC: 113 MG/DL (ref ?–130)
PLATELET # BLD AUTO: 165 10(3)UL (ref 150–450)
RBC # BLD AUTO: 4.25 X10(6)UL
TRIGL SERPL-MCNC: 58 MG/DL (ref 30–149)
TSI SER-ACNC: 1.84 MIU/ML (ref 0.36–3.74)
VLDLC SERPL CALC-MCNC: 10 MG/DL (ref 0–30)
WBC # BLD AUTO: 5 X10(3) UL (ref 4–11)

## 2022-02-22 PROCEDURE — 3074F SYST BP LT 130 MM HG: CPT | Performed by: INTERNAL MEDICINE

## 2022-02-22 PROCEDURE — 83090 ASSAY OF HOMOCYSTEINE: CPT

## 2022-02-22 PROCEDURE — 99395 PREV VISIT EST AGE 18-39: CPT | Performed by: INTERNAL MEDICINE

## 2022-02-22 PROCEDURE — 85025 COMPLETE CBC W/AUTO DIFF WBC: CPT

## 2022-02-22 PROCEDURE — 86140 C-REACTIVE PROTEIN: CPT

## 2022-02-22 PROCEDURE — 84443 ASSAY THYROID STIM HORMONE: CPT

## 2022-02-22 PROCEDURE — 80061 LIPID PANEL: CPT

## 2022-02-22 PROCEDURE — 36415 COLL VENOUS BLD VENIPUNCTURE: CPT

## 2022-02-22 PROCEDURE — 3078F DIAST BP <80 MM HG: CPT | Performed by: INTERNAL MEDICINE

## 2022-02-22 PROCEDURE — 3008F BODY MASS INDEX DOCD: CPT | Performed by: INTERNAL MEDICINE

## 2022-02-22 RX ORDER — DEXMETHYLPHENIDATE HYDROCHLORIDE 5 MG/1
5 CAPSULE, EXTENDED RELEASE ORAL EVERY MORNING
COMMUNITY
Start: 2022-02-09

## 2022-02-24 ENCOUNTER — MED REC SCAN ONLY (OUTPATIENT)
Dept: INTERNAL MEDICINE CLINIC | Facility: CLINIC | Age: 37
End: 2022-02-24

## 2022-03-15 ENCOUNTER — TELEPHONE (OUTPATIENT)
Dept: ALLERGY | Facility: CLINIC | Age: 37
End: 2022-03-15

## 2022-03-15 NOTE — TELEPHONE ENCOUNTER
Labs from 11/10/2022 have not been completed. Letter sent home. Postponed x 2 months. Dr. Maxine Dickerson, if labs have not been completed in that time okay to cancel?

## 2022-05-19 ENCOUNTER — OFFICE VISIT (OUTPATIENT)
Dept: INTERNAL MEDICINE CLINIC | Facility: CLINIC | Age: 37
End: 2022-05-19
Payer: COMMERCIAL

## 2022-05-19 ENCOUNTER — NURSE TRIAGE (OUTPATIENT)
Dept: INTERNAL MEDICINE CLINIC | Facility: CLINIC | Age: 37
End: 2022-05-19

## 2022-05-19 VITALS
DIASTOLIC BLOOD PRESSURE: 78 MMHG | BODY MASS INDEX: 21.77 KG/M2 | SYSTOLIC BLOOD PRESSURE: 116 MMHG | HEART RATE: 87 BPM | HEIGHT: 65 IN | WEIGHT: 130.69 LBS

## 2022-05-19 DIAGNOSIS — R05.9 COUGH: Primary | ICD-10-CM

## 2022-05-19 PROCEDURE — 99213 OFFICE O/P EST LOW 20 MIN: CPT | Performed by: NURSE PRACTITIONER

## 2022-05-19 PROCEDURE — 3078F DIAST BP <80 MM HG: CPT | Performed by: NURSE PRACTITIONER

## 2022-05-19 PROCEDURE — 3074F SYST BP LT 130 MM HG: CPT | Performed by: NURSE PRACTITIONER

## 2022-05-19 PROCEDURE — 3008F BODY MASS INDEX DOCD: CPT | Performed by: NURSE PRACTITIONER

## 2022-05-19 RX ORDER — BENZONATATE 100 MG/1
100 CAPSULE ORAL 3 TIMES DAILY PRN
Qty: 20 CAPSULE | Refills: 0 | Status: SHIPPED | OUTPATIENT
Start: 2022-05-19 | End: 2022-05-26

## 2022-05-19 RX ORDER — ALBUTEROL SULFATE 90 UG/1
2 AEROSOL, METERED RESPIRATORY (INHALATION) EVERY 6 HOURS PRN
Qty: 1 EACH | Refills: 0 | Status: SHIPPED | OUTPATIENT
Start: 2022-05-19

## 2022-05-19 NOTE — PATIENT INSTRUCTIONS
Tylenol two tabs every six hours x 3 days. Not to exceed 4 grams in one day.    Take ibuprofen 400 mg every 8  Hours    Benzonatate 100mg every eight hours  Albuterol inhaler every six hours

## 2022-05-19 NOTE — TELEPHONE ENCOUNTER
Approved appt with Odin JACOBS; (per secure chat)    Patient was made appt 11am today for evaluation.

## 2022-05-19 NOTE — TELEPHONE ENCOUNTER
REYNALDO Han: can we book patient with you? Patient would like to get assessed and make sure she gets the correct treatment. She does not want to go to IC. Patient seeking in-office appt. PCP is not available. covid positive on 4/27/22. Had cold symptoms, (fever, cough, bodyaches)  After a week was getting better. Younger son had \" para influenza/croup\" last week. Last week Thursday started with low grade fever, sore thorat, bad swollen throat; she went to Immediate care and tested neg for strep. Respiratory panel done; all negative. Was still given ABX Teja Dunks; felt better Sunday. Friday 5/13/22 had negative Covid PCR test  Monday 5/16/22  Tested neg for Covid PCR (patient started with tickle dry cough again)    Tuesday 5/17/22 had surgery (breast implants replaced) no complications. Wednesday 5/18/22,by mid-day patient started again with coughing; unable to sleep last night. Now continues with cough, productive. Patient has pain from the incisions due to constant coughing. She takes advil for the pains.

## 2022-05-19 NOTE — ASSESSMENT & PLAN NOTE
18-year-old female I am meeting for the first time this complaining of a cough. She had COVID-19 3 weeks ago. She is having a lingering cough. Her entire family had COVID-23and her 3year-old developed parainfluenza 4. With a bilateral ear infection. She was placed on Z-Jose Ramon which she is taking now. She had breast implants exchanged 2 days ago which was a same-day surgery. Incisions look good no signs of infection. No fever, chills, body aches or feeling ill. At this time. Lungs are clear bilaterally with no wheezing. .  She had a Breo inhaler at home and was using that. She states that  To take medications. She was given tramadol for the pain but decided not to take that and is taking ibuprofen. Plan     Tylenol two tabs every six hours x 3 days. Not to exceed 4 grams in one day.    Take ibuprofen 400 mg every 8  Hours    Benzonatate 100mg every eight hours    Albuterol inhaler every six hours    Hot tea with honey    Patient to call if symptoms do not improve

## 2022-05-23 ENCOUNTER — TELEPHONE (OUTPATIENT)
Dept: INTERNAL MEDICINE CLINIC | Facility: CLINIC | Age: 37
End: 2022-05-23

## 2022-05-23 DIAGNOSIS — Z11.3 SCREENING EXAMINATION FOR STD (SEXUALLY TRANSMITTED DISEASE): Primary | ICD-10-CM

## 2022-05-23 NOTE — TELEPHONE ENCOUNTER
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

## 2022-05-23 NOTE — TELEPHONE ENCOUNTER
Patient states her  was diagnosed with shingles on his abdomen shortly after being diagnosed with Covid. Patient's husbands lesions were swabbed and is positive for Herpes 2.  taking Valtrex. Patient cannot understand how this is possible as they've been loyal to each other for over 14 years and neither one of them has had a break out of herpes 2. Patient upset, asking for lab work to rule out herpes, and asking Dr. Belinda Villanueva for further advice regarding whether or not they will need to wear condoms all the time.

## 2022-05-25 ENCOUNTER — LAB ENCOUNTER (OUTPATIENT)
Dept: LAB | Age: 37
End: 2022-05-25
Attending: INTERNAL MEDICINE
Payer: COMMERCIAL

## 2022-05-25 DIAGNOSIS — Z11.3 SCREENING EXAMINATION FOR VENEREAL DISEASE: Primary | ICD-10-CM

## 2022-05-25 LAB
HSV 1 GLYCOPROTEIN G, IGG: POSITIVE
HSV 2 GLYCOPROTEIN G, IGG: NEGATIVE

## 2022-05-25 PROCEDURE — 86695 HERPES SIMPLEX TYPE 1 TEST: CPT

## 2022-05-25 PROCEDURE — 86696 HERPES SIMPLEX TYPE 2 TEST: CPT

## 2022-05-25 PROCEDURE — 36415 COLL VENOUS BLD VENIPUNCTURE: CPT

## 2022-08-23 ENCOUNTER — OFFICE VISIT (OUTPATIENT)
Dept: ALLERGY | Facility: CLINIC | Age: 37
End: 2022-08-23
Payer: COMMERCIAL

## 2022-08-23 ENCOUNTER — NURSE ONLY (OUTPATIENT)
Dept: ALLERGY | Facility: CLINIC | Age: 37
End: 2022-08-23
Payer: COMMERCIAL

## 2022-08-23 DIAGNOSIS — J30.1 SEASONAL ALLERGIC RHINITIS DUE TO POLLEN: Primary | ICD-10-CM

## 2022-08-23 DIAGNOSIS — K90.49 ALCOHOL INTOLERANCE: ICD-10-CM

## 2022-08-23 DIAGNOSIS — J30.2 SEASONAL ALLERGIES: ICD-10-CM

## 2022-08-23 PROCEDURE — 99214 OFFICE O/P EST MOD 30 MIN: CPT | Performed by: ALLERGY & IMMUNOLOGY

## 2022-08-23 PROCEDURE — 95004 PERQ TESTS W/ALRGNC XTRCS: CPT | Performed by: ALLERGY & IMMUNOLOGY

## 2022-08-23 RX ORDER — LEVOCETIRIZINE DIHYDROCHLORIDE 5 MG/1
5 TABLET, FILM COATED ORAL EVERY EVENING
Qty: 90 TABLET | Refills: 0 | Status: SHIPPED | OUTPATIENT
Start: 2022-08-23

## 2022-09-14 ENCOUNTER — TELEMEDICINE (OUTPATIENT)
Dept: INTERNAL MEDICINE CLINIC | Facility: CLINIC | Age: 37
End: 2022-09-14
Payer: COMMERCIAL

## 2022-09-14 DIAGNOSIS — E55.9 VITAMIN D DEFICIENCY: ICD-10-CM

## 2022-09-14 DIAGNOSIS — L65.9 FALLING HAIR: Primary | ICD-10-CM

## 2022-09-14 DIAGNOSIS — K76.89 FOCAL NODULAR HYPERPLASIA OF LIVER: ICD-10-CM

## 2022-09-14 PROCEDURE — 99213 OFFICE O/P EST LOW 20 MIN: CPT | Performed by: INTERNAL MEDICINE

## 2022-09-16 ENCOUNTER — HOSPITAL ENCOUNTER (OUTPATIENT)
Dept: ULTRASOUND IMAGING | Facility: HOSPITAL | Age: 37
Discharge: HOME | End: 2022-09-16
Attending: OBSTETRICS & GYNECOLOGY
Payer: COMMERCIAL

## 2022-09-16 DIAGNOSIS — R10.2 PELVIC PAIN: ICD-10-CM

## 2022-09-16 PROCEDURE — 76830 TRANSVAGINAL US NON-OB: CPT | Performed by: OBSTETRICS & GYNECOLOGY

## 2022-09-16 PROCEDURE — 76856 US EXAM PELVIC COMPLETE: CPT | Performed by: OBSTETRICS & GYNECOLOGY

## 2022-09-19 ENCOUNTER — LAB ENCOUNTER (OUTPATIENT)
Dept: LAB | Age: 37
End: 2022-09-19
Attending: INTERNAL MEDICINE

## 2022-09-19 DIAGNOSIS — L65.9 FALLING HAIR: ICD-10-CM

## 2022-09-19 DIAGNOSIS — E55.9 VITAMIN D DEFICIENCY: ICD-10-CM

## 2022-09-19 DIAGNOSIS — K76.89 FOCAL NODULAR HYPERPLASIA OF LIVER: ICD-10-CM

## 2022-09-19 DIAGNOSIS — Z11.3 SCREENING EXAMINATION FOR VENEREAL DISEASE: Primary | ICD-10-CM

## 2022-09-19 LAB
ALBUMIN SERPL-MCNC: 3.8 G/DL (ref 3.4–5)
ALBUMIN/GLOB SERPL: 1.2 {RATIO} (ref 1–2)
ALP LIVER SERPL-CCNC: 59 U/L
ALT SERPL-CCNC: 22 U/L
ANION GAP SERPL CALC-SCNC: 6 MMOL/L (ref 0–18)
AST SERPL-CCNC: 12 U/L (ref 15–37)
BILIRUB SERPL-MCNC: 0.7 MG/DL (ref 0.1–2)
BUN BLD-MCNC: 15 MG/DL (ref 7–18)
BUN/CREAT SERPL: 21.7 (ref 10–20)
CALCIUM BLD-MCNC: 8.8 MG/DL (ref 8.5–10.1)
CHLORIDE SERPL-SCNC: 107 MMOL/L (ref 98–112)
CO2 SERPL-SCNC: 26 MMOL/L (ref 21–32)
CREAT BLD-MCNC: 0.69 MG/DL
CRP SERPL-MCNC: <0.29 MG/DL (ref ?–0.3)
DEPRECATED HBV CORE AB SER IA-ACNC: 66.1 NG/ML
FASTING STATUS PATIENT QL REPORTED: YES
GFR SERPLBLD BASED ON 1.73 SQ M-ARVRAT: 114 ML/MIN/1.73M2 (ref 60–?)
GLOBULIN PLAS-MCNC: 3.2 G/DL (ref 2.8–4.4)
GLUCOSE BLD-MCNC: 90 MG/DL (ref 70–99)
IRON SATN MFR SERPL: 28 %
IRON SERPL-MCNC: 91 UG/DL
OSMOLALITY SERPL CALC.SUM OF ELEC: 288 MOSM/KG (ref 275–295)
POTASSIUM SERPL-SCNC: 3.6 MMOL/L (ref 3.5–5.1)
PROT SERPL-MCNC: 7 G/DL (ref 6.4–8.2)
SODIUM SERPL-SCNC: 139 MMOL/L (ref 136–145)
TIBC SERPL-MCNC: 322 UG/DL (ref 240–450)
TRANSFERRIN SERPL-MCNC: 216 MG/DL (ref 200–360)
VIT B12 SERPL-MCNC: 358 PG/ML (ref 193–986)
VIT D+METAB SERPL-MCNC: 23.6 NG/ML (ref 30–100)

## 2022-09-19 PROCEDURE — 82306 VITAMIN D 25 HYDROXY: CPT

## 2022-09-19 PROCEDURE — 86140 C-REACTIVE PROTEIN: CPT

## 2022-09-19 PROCEDURE — 84466 ASSAY OF TRANSFERRIN: CPT

## 2022-09-19 PROCEDURE — 82607 VITAMIN B-12: CPT

## 2022-09-19 PROCEDURE — 80053 COMPREHEN METABOLIC PANEL: CPT

## 2022-09-19 PROCEDURE — 36415 COLL VENOUS BLD VENIPUNCTURE: CPT

## 2022-09-19 PROCEDURE — 86696 HERPES SIMPLEX TYPE 2 TEST: CPT

## 2022-09-19 PROCEDURE — 86695 HERPES SIMPLEX TYPE 1 TEST: CPT

## 2022-09-19 PROCEDURE — 82728 ASSAY OF FERRITIN: CPT

## 2022-09-19 PROCEDURE — 83540 ASSAY OF IRON: CPT

## 2022-09-20 ENCOUNTER — PATIENT MESSAGE (OUTPATIENT)
Dept: INTERNAL MEDICINE CLINIC | Facility: CLINIC | Age: 37
End: 2022-09-20

## 2022-09-20 DIAGNOSIS — K76.89 FOCAL NODULAR HYPERPLASIA OF LIVER: Primary | ICD-10-CM

## 2022-09-20 RX ORDER — ERGOCALCIFEROL 1.25 MG/1
50000 CAPSULE ORAL WEEKLY
Qty: 4 CAPSULE | Refills: 3 | Status: SHIPPED | OUTPATIENT
Start: 2022-09-20

## 2022-09-21 LAB
HSV 1 GLYCOPROTEIN G, IGG: POSITIVE
HSV 2 GLYCOPROTEIN G, IGG: NEGATIVE

## 2022-10-20 ENCOUNTER — LAB ENCOUNTER (OUTPATIENT)
Dept: LAB | Age: 37
End: 2022-10-20
Attending: INTERNAL MEDICINE
Payer: COMMERCIAL

## 2022-10-20 DIAGNOSIS — L65.9 FALLING HAIR: ICD-10-CM

## 2022-10-20 DIAGNOSIS — K76.89 FOCAL NODULAR HYPERPLASIA OF LIVER: ICD-10-CM

## 2022-10-20 LAB
CHOLEST SERPL-MCNC: 167 MG/DL (ref ?–200)
CRP SERPL HS-MCNC: 0.46 MG/L (ref ?–3)
EST. AVERAGE GLUCOSE BLD GHB EST-MCNC: 97 MG/DL (ref 68–126)
FASTING PATIENT LIPID ANSWER: YES
FOLATE SERPL-MCNC: 15.5 NG/ML (ref 8.7–?)
HBA1C MFR BLD: 5 % (ref ?–5.7)
HCYS SERPL-SCNC: 5.8 UMOL/L (ref 3.2–10.7)
HDLC SERPL-MCNC: 62 MG/DL (ref 40–59)
LDLC SERPL CALC-MCNC: 93 MG/DL (ref ?–100)
NONHDLC SERPL-MCNC: 105 MG/DL (ref ?–130)
T3FREE SERPL-MCNC: 2.74 PG/ML (ref 2.4–4.2)
T4 FREE SERPL-MCNC: 1 NG/DL (ref 0.8–1.7)
TRIGL SERPL-MCNC: 63 MG/DL (ref 30–149)
TSI SER-ACNC: 2.57 MIU/ML (ref 0.36–3.74)
VLDLC SERPL CALC-MCNC: 10 MG/DL (ref 0–30)

## 2022-10-20 PROCEDURE — 83090 ASSAY OF HOMOCYSTEINE: CPT

## 2022-10-20 PROCEDURE — 84403 ASSAY OF TOTAL TESTOSTERONE: CPT

## 2022-10-20 PROCEDURE — 86141 C-REACTIVE PROTEIN HS: CPT

## 2022-10-20 PROCEDURE — 84481 FREE ASSAY (FT-3): CPT

## 2022-10-20 PROCEDURE — 82746 ASSAY OF FOLIC ACID SERUM: CPT

## 2022-10-20 PROCEDURE — 80061 LIPID PANEL: CPT

## 2022-10-20 PROCEDURE — 84207 ASSAY OF VITAMIN B-6: CPT

## 2022-10-20 PROCEDURE — 84402 ASSAY OF FREE TESTOSTERONE: CPT

## 2022-10-20 PROCEDURE — 84443 ASSAY THYROID STIM HORMONE: CPT

## 2022-10-20 PROCEDURE — 84439 ASSAY OF FREE THYROXINE: CPT

## 2022-10-20 PROCEDURE — 83036 HEMOGLOBIN GLYCOSYLATED A1C: CPT

## 2022-10-20 PROCEDURE — 36415 COLL VENOUS BLD VENIPUNCTURE: CPT

## 2022-10-24 LAB — VITAMIN B6: 52.2 NMOL/L

## 2022-11-02 LAB
TESTOSTERONE, FREE, S: 0.18 NG/DL
TESTOSTERONE, TOTAL, S: 23 NG/DL

## 2023-02-03 ENCOUNTER — NURSE TRIAGE (OUTPATIENT)
Dept: INTERNAL MEDICINE CLINIC | Facility: CLINIC | Age: 38
End: 2023-02-03

## 2023-02-03 ENCOUNTER — TELEMEDICINE (OUTPATIENT)
Dept: INTERNAL MEDICINE CLINIC | Facility: CLINIC | Age: 38
End: 2023-02-03

## 2023-02-03 DIAGNOSIS — M54.50 ACUTE BILATERAL LOW BACK PAIN WITHOUT SCIATICA: Primary | ICD-10-CM

## 2023-02-03 RX ORDER — CYCLOBENZAPRINE HCL 5 MG
5 TABLET ORAL 3 TIMES DAILY PRN
Qty: 30 TABLET | Refills: 1 | Status: SHIPPED | OUTPATIENT
Start: 2023-02-03

## 2023-02-03 RX ORDER — TRAMADOL HYDROCHLORIDE 50 MG/1
50 TABLET ORAL EVERY 6 HOURS PRN
Qty: 30 TABLET | Refills: 0 | Status: SHIPPED | OUTPATIENT
Start: 2023-02-03

## 2023-02-03 RX ORDER — METHYLPREDNISOLONE 4 MG/1
TABLET ORAL
Qty: 1 EACH | Refills: 0 | Status: SHIPPED | OUTPATIENT
Start: 2023-02-03

## 2023-02-07 ENCOUNTER — TELEPHONE (OUTPATIENT)
Dept: NEUROLOGY | Facility: CLINIC | Age: 38
End: 2023-02-07

## 2023-02-07 NOTE — TELEPHONE ENCOUNTER
Patient calling to see if Florinda Guzman can see her sooner than 4/12/23 as she had a flare up and is currently in a lot of pain, she will like to speak to Florinda Guzman or a nurse for advice.

## 2023-02-08 NOTE — TELEPHONE ENCOUNTER
DALYTCB.    LOV: never been seen  NOV: 4/12/23    Sooner appt placed on hold for 2/15/23 at 9:30 am.

## 2023-02-09 NOTE — TELEPHONE ENCOUNTER
S/w patient and will take appointment for 2/15/23 at 9:30am in [place], RN had placed a hold in appointment slot but I'm unable to open it, will route it to [place].

## 2023-02-15 ENCOUNTER — OFFICE VISIT (OUTPATIENT)
Dept: PHYSICAL MEDICINE AND REHAB | Facility: CLINIC | Age: 38
End: 2023-02-15
Payer: COMMERCIAL

## 2023-02-15 VITALS — WEIGHT: 127 LBS | BODY MASS INDEX: 21.16 KG/M2 | HEIGHT: 65 IN | OXYGEN SATURATION: 99 % | HEART RATE: 92 BPM

## 2023-02-15 DIAGNOSIS — M41.125 ADOLESCENT IDIOPATHIC SCOLIOSIS OF THORACOLUMBAR REGION: Primary | ICD-10-CM

## 2023-02-15 DIAGNOSIS — M48.061 LUMBAR FORAMINAL STENOSIS: ICD-10-CM

## 2023-02-15 DIAGNOSIS — M51.9 LUMBAR DISC DISEASE: ICD-10-CM

## 2023-02-15 PROCEDURE — 99204 OFFICE O/P NEW MOD 45 MIN: CPT | Performed by: PHYSICAL MEDICINE & REHABILITATION

## 2023-02-15 RX ORDER — DEXTROAMPHETAMINE SACCHARATE, AMPHETAMINE ASPARTATE, DEXTROAMPHETAMINE SULFATE AND AMPHETAMINE SULFATE 1.25; 1.25; 1.25; 1.25 MG/1; MG/1; MG/1; MG/1
TABLET ORAL DAILY
COMMUNITY

## 2023-02-15 NOTE — PATIENT INSTRUCTIONS
Plan  The patient will continue with her home exercise program.    She will look into doing the PT for the scoliosis. She will get scoliosis x-rays. She will follow up in 6-12 months or sooner if needed.

## 2023-03-08 ENCOUNTER — ORDER TRANSCRIPTION (OUTPATIENT)
Dept: PHYSICAL THERAPY | Age: 38
End: 2023-03-08

## 2023-03-08 ENCOUNTER — TELEPHONE (OUTPATIENT)
Dept: PHYSICAL THERAPY | Age: 38
End: 2023-03-08

## 2023-03-08 DIAGNOSIS — M41.125 ADOLESCENT IDIOPATHIC SCOLIOSIS OF THORACOLUMBAR REGION: Primary | ICD-10-CM

## 2023-03-08 DIAGNOSIS — M51.9 LUMBAR DISC DISEASE: ICD-10-CM

## 2023-03-08 DIAGNOSIS — M48.061 LUMBAR FORAMINAL STENOSIS: ICD-10-CM

## 2023-03-09 ENCOUNTER — ORDER TRANSCRIPTION (OUTPATIENT)
Dept: PHYSICAL THERAPY | Facility: HOSPITAL | Age: 38
End: 2023-03-09

## 2023-03-09 DIAGNOSIS — M41.125 ADOLESCENT IDIOPATHIC SCOLIOSIS OF THORACOLUMBAR REGION: Primary | ICD-10-CM

## 2023-03-09 DIAGNOSIS — M48.061 LUMBAR FORAMINAL STENOSIS: ICD-10-CM

## 2023-03-09 DIAGNOSIS — M51.9 LUMBAR DISC DISEASE: ICD-10-CM

## 2023-03-21 ENCOUNTER — TELEPHONE (OUTPATIENT)
Dept: PHYSICAL THERAPY | Facility: HOSPITAL | Age: 38
End: 2023-03-21

## 2023-03-23 ENCOUNTER — APPOINTMENT (OUTPATIENT)
Dept: PHYSICAL THERAPY | Age: 38
End: 2023-03-23
Attending: PHYSICAL MEDICINE & REHABILITATION
Payer: COMMERCIAL

## 2023-04-06 ENCOUNTER — OFFICE VISIT (OUTPATIENT)
Dept: PHYSICAL THERAPY | Age: 38
End: 2023-04-06
Attending: PHYSICAL MEDICINE & REHABILITATION
Payer: COMMERCIAL

## 2023-04-06 PROCEDURE — 97161 PT EVAL LOW COMPLEX 20 MIN: CPT

## 2023-04-06 PROCEDURE — 97110 THERAPEUTIC EXERCISES: CPT

## 2023-04-06 PROCEDURE — 97530 THERAPEUTIC ACTIVITIES: CPT

## 2023-04-10 ENCOUNTER — TELEMEDICINE (OUTPATIENT)
Dept: INTERNAL MEDICINE CLINIC | Facility: CLINIC | Age: 38
End: 2023-04-10

## 2023-04-10 DIAGNOSIS — R53.83 FATIGUE, UNSPECIFIED TYPE: ICD-10-CM

## 2023-04-10 DIAGNOSIS — H02.844 PAIN AND SWELLING OF UPPER EYELID OF LEFT EYE: Primary | ICD-10-CM

## 2023-04-10 DIAGNOSIS — H02.89 PAIN AND SWELLING OF UPPER EYELID OF LEFT EYE: Primary | ICD-10-CM

## 2023-04-10 RX ORDER — TOBRAMYCIN 3 MG/ML
1 SOLUTION/ DROPS OPHTHALMIC EVERY 4 HOURS
Qty: 5 ML | Refills: 0 | Status: SHIPPED | OUTPATIENT
Start: 2023-04-10

## 2023-04-11 ENCOUNTER — LAB ENCOUNTER (OUTPATIENT)
Dept: LAB | Age: 38
End: 2023-04-11
Attending: NURSE PRACTITIONER
Payer: COMMERCIAL

## 2023-04-11 ENCOUNTER — OFFICE VISIT (OUTPATIENT)
Dept: PHYSICAL THERAPY | Age: 38
End: 2023-04-11
Attending: PHYSICAL MEDICINE & REHABILITATION
Payer: COMMERCIAL

## 2023-04-11 DIAGNOSIS — R53.83 FATIGUE, UNSPECIFIED TYPE: ICD-10-CM

## 2023-04-11 LAB
ALBUMIN SERPL-MCNC: 3.9 G/DL (ref 3.4–5)
ALBUMIN/GLOB SERPL: 1.3 {RATIO} (ref 1–2)
ALP LIVER SERPL-CCNC: 55 U/L
ALT SERPL-CCNC: 21 U/L
ANION GAP SERPL CALC-SCNC: 6 MMOL/L (ref 0–18)
AST SERPL-CCNC: 12 U/L (ref 15–37)
BASOPHILS # BLD AUTO: 0.05 X10(3) UL (ref 0–0.2)
BASOPHILS NFR BLD AUTO: 0.7 %
BILIRUB SERPL-MCNC: 0.6 MG/DL (ref 0.1–2)
BUN BLD-MCNC: 10 MG/DL (ref 7–18)
BUN/CREAT SERPL: 14.7 (ref 10–20)
CALCIUM BLD-MCNC: 9.4 MG/DL (ref 8.5–10.1)
CHLORIDE SERPL-SCNC: 105 MMOL/L (ref 98–112)
CO2 SERPL-SCNC: 27 MMOL/L (ref 21–32)
CREAT BLD-MCNC: 0.68 MG/DL
DEPRECATED RDW RBC AUTO: 40.4 FL (ref 35.1–46.3)
EOSINOPHIL # BLD AUTO: 0.06 X10(3) UL (ref 0–0.7)
EOSINOPHIL NFR BLD AUTO: 0.9 %
ERYTHROCYTE [DISTWIDTH] IN BLOOD BY AUTOMATED COUNT: 11.5 % (ref 11–15)
FASTING STATUS PATIENT QL REPORTED: NO
GFR SERPLBLD BASED ON 1.73 SQ M-ARVRAT: 114 ML/MIN/1.73M2 (ref 60–?)
GLOBULIN PLAS-MCNC: 3.1 G/DL (ref 2.8–4.4)
GLUCOSE BLD-MCNC: 90 MG/DL (ref 70–99)
HCT VFR BLD AUTO: 40.5 %
HGB BLD-MCNC: 13.5 G/DL
IMM GRANULOCYTES # BLD AUTO: 0.02 X10(3) UL (ref 0–1)
IMM GRANULOCYTES NFR BLD: 0.3 %
LYMPHOCYTES # BLD AUTO: 2.05 X10(3) UL (ref 1–4)
LYMPHOCYTES NFR BLD AUTO: 29.7 %
MCH RBC QN AUTO: 31.9 PG (ref 26–34)
MCHC RBC AUTO-ENTMCNC: 33.3 G/DL (ref 31–37)
MCV RBC AUTO: 95.7 FL
MONOCYTES # BLD AUTO: 0.5 X10(3) UL (ref 0.1–1)
MONOCYTES NFR BLD AUTO: 7.2 %
NEUTROPHILS # BLD AUTO: 4.23 X10 (3) UL (ref 1.5–7.7)
NEUTROPHILS # BLD AUTO: 4.23 X10(3) UL (ref 1.5–7.7)
NEUTROPHILS NFR BLD AUTO: 61.2 %
OSMOLALITY SERPL CALC.SUM OF ELEC: 285 MOSM/KG (ref 275–295)
PLATELET # BLD AUTO: 196 10(3)UL (ref 150–450)
POTASSIUM SERPL-SCNC: 4 MMOL/L (ref 3.5–5.1)
PROT SERPL-MCNC: 7 G/DL (ref 6.4–8.2)
RBC # BLD AUTO: 4.23 X10(6)UL
SODIUM SERPL-SCNC: 138 MMOL/L (ref 136–145)
TSI SER-ACNC: 2.18 MIU/ML (ref 0.36–3.74)
VIT B12 SERPL-MCNC: 397 PG/ML (ref 193–986)
VIT D+METAB SERPL-MCNC: 36.4 NG/ML (ref 30–100)
WBC # BLD AUTO: 6.9 X10(3) UL (ref 4–11)

## 2023-04-11 PROCEDURE — 97110 THERAPEUTIC EXERCISES: CPT

## 2023-04-11 PROCEDURE — 80053 COMPREHEN METABOLIC PANEL: CPT

## 2023-04-11 PROCEDURE — 36415 COLL VENOUS BLD VENIPUNCTURE: CPT

## 2023-04-11 PROCEDURE — 84443 ASSAY THYROID STIM HORMONE: CPT

## 2023-04-11 PROCEDURE — 97140 MANUAL THERAPY 1/> REGIONS: CPT

## 2023-04-11 PROCEDURE — 85025 COMPLETE CBC W/AUTO DIFF WBC: CPT

## 2023-04-11 PROCEDURE — 82306 VITAMIN D 25 HYDROXY: CPT

## 2023-04-11 PROCEDURE — 82607 VITAMIN B-12: CPT

## 2023-04-13 ENCOUNTER — APPOINTMENT (OUTPATIENT)
Dept: PHYSICAL THERAPY | Age: 38
End: 2023-04-13
Attending: PHYSICAL MEDICINE & REHABILITATION
Payer: COMMERCIAL

## 2023-04-13 ENCOUNTER — TELEPHONE (OUTPATIENT)
Dept: PHYSICAL THERAPY | Facility: HOSPITAL | Age: 38
End: 2023-04-13

## 2023-04-18 ENCOUNTER — OFFICE VISIT (OUTPATIENT)
Dept: PHYSICAL THERAPY | Age: 38
End: 2023-04-18
Attending: PHYSICAL MEDICINE & REHABILITATION
Payer: COMMERCIAL

## 2023-04-18 PROCEDURE — 97110 THERAPEUTIC EXERCISES: CPT

## 2023-04-18 PROCEDURE — 97140 MANUAL THERAPY 1/> REGIONS: CPT

## 2023-04-20 ENCOUNTER — APPOINTMENT (OUTPATIENT)
Dept: PHYSICAL THERAPY | Age: 38
End: 2023-04-20
Attending: PHYSICAL MEDICINE & REHABILITATION
Payer: COMMERCIAL

## 2023-04-28 ENCOUNTER — APPOINTMENT (OUTPATIENT)
Dept: PHYSICAL THERAPY | Age: 38
End: 2023-04-28
Attending: PHYSICAL MEDICINE & REHABILITATION
Payer: COMMERCIAL

## 2023-05-05 ENCOUNTER — OFFICE VISIT (OUTPATIENT)
Dept: PHYSICAL THERAPY | Age: 38
End: 2023-05-05
Attending: PHYSICAL MEDICINE & REHABILITATION
Payer: COMMERCIAL

## 2023-05-05 PROCEDURE — 97140 MANUAL THERAPY 1/> REGIONS: CPT

## 2023-05-05 PROCEDURE — 97110 THERAPEUTIC EXERCISES: CPT

## 2023-05-08 ENCOUNTER — APPOINTMENT (OUTPATIENT)
Dept: PHYSICAL THERAPY | Age: 38
End: 2023-05-08
Attending: PHYSICAL MEDICINE & REHABILITATION
Payer: COMMERCIAL

## 2023-05-18 ENCOUNTER — APPOINTMENT (OUTPATIENT)
Dept: PHYSICAL THERAPY | Age: 38
End: 2023-05-18
Attending: PHYSICAL MEDICINE & REHABILITATION
Payer: COMMERCIAL

## 2023-08-25 ENCOUNTER — TELEPHONE (OUTPATIENT)
Dept: INTERNAL MEDICINE CLINIC | Facility: CLINIC | Age: 38
End: 2023-08-25

## 2023-08-25 NOTE — TELEPHONE ENCOUNTER
Pt stated she saw her Derm Dr  this week, mentioned hair shedding, was advised to have PCP order   Ferritin,Vit D,Vitamin B12,TSH,Thyroid Antibodies, CBC    Advised pt LOV 2/22/22- Need an OV - pt asked to send message to see if need appt first   Please advise

## 2023-08-26 NOTE — TELEPHONE ENCOUNTER
Yes please have her schedule an appointment, overdue for annual physical since 2/2023, can be with me if unable to get in with Dr. Jadon Meneses

## 2023-08-28 ENCOUNTER — LAB ENCOUNTER (OUTPATIENT)
Dept: LAB | Age: 38
End: 2023-08-28
Payer: COMMERCIAL

## 2023-08-28 ENCOUNTER — OFFICE VISIT (OUTPATIENT)
Dept: INTERNAL MEDICINE CLINIC | Facility: CLINIC | Age: 38
End: 2023-08-28

## 2023-08-28 VITALS
DIASTOLIC BLOOD PRESSURE: 72 MMHG | HEIGHT: 65 IN | WEIGHT: 125 LBS | OXYGEN SATURATION: 99 % | SYSTOLIC BLOOD PRESSURE: 116 MMHG | BODY MASS INDEX: 20.83 KG/M2 | HEART RATE: 89 BPM

## 2023-08-28 DIAGNOSIS — L65.9 FALLING HAIR: ICD-10-CM

## 2023-08-28 DIAGNOSIS — K14.6 BURNING MOUTH SYNDROME: ICD-10-CM

## 2023-08-28 DIAGNOSIS — Z00.00 ROUTINE GENERAL MEDICAL EXAMINATION AT A HEALTH CARE FACILITY: ICD-10-CM

## 2023-08-28 DIAGNOSIS — L65.9 FALLING HAIR: Primary | ICD-10-CM

## 2023-08-28 PROBLEM — H69.93 EUSTACHIAN TUBE DYSFUNCTION, BILATERAL: Status: RESOLVED | Noted: 2019-11-11 | Resolved: 2023-08-28

## 2023-08-28 PROBLEM — M51.37 DEGENERATION OF LUMBAR OR LUMBOSACRAL INTERVERTEBRAL DISC: Status: ACTIVE | Noted: 2019-10-04

## 2023-08-28 PROBLEM — G89.29 CHRONIC PAIN OF BOTH KNEES: Status: RESOLVED | Noted: 2018-07-25 | Resolved: 2023-08-28

## 2023-08-28 PROBLEM — M25.561 CHRONIC PAIN OF BOTH KNEES: Status: RESOLVED | Noted: 2018-07-25 | Resolved: 2023-08-28

## 2023-08-28 PROBLEM — K58.9 IRRITABLE BOWEL SYNDROME WITHOUT DIARRHEA: Status: RESOLVED | Noted: 2017-07-06 | Resolved: 2023-08-28

## 2023-08-28 PROBLEM — M51.379 DEGENERATION OF LUMBAR OR LUMBOSACRAL INTERVERTEBRAL DISC: Status: ACTIVE | Noted: 2019-10-04

## 2023-08-28 PROBLEM — G44.209 TENSION-TYPE HEADACHE, NOT INTRACTABLE: Status: RESOLVED | Noted: 2018-07-25 | Resolved: 2023-08-28

## 2023-08-28 PROBLEM — M25.562 CHRONIC PAIN OF BOTH KNEES: Status: RESOLVED | Noted: 2018-07-25 | Resolved: 2023-08-28

## 2023-08-28 PROBLEM — J34.89 NASAL VESTIBULITIS: Status: RESOLVED | Noted: 2019-06-18 | Resolved: 2023-08-28

## 2023-08-28 PROBLEM — F45.8 BRUXISM (TEETH GRINDING): Status: ACTIVE | Noted: 2019-06-18

## 2023-08-28 LAB
ALBUMIN SERPL-MCNC: 4 G/DL (ref 3.4–5)
ALBUMIN/GLOB SERPL: 1.2 {RATIO} (ref 1–2)
ALP LIVER SERPL-CCNC: 57 U/L
ALT SERPL-CCNC: 22 U/L
ANION GAP SERPL CALC-SCNC: 8 MMOL/L (ref 0–18)
AST SERPL-CCNC: 12 U/L (ref 15–37)
BILIRUB SERPL-MCNC: 0.6 MG/DL (ref 0.1–2)
BUN BLD-MCNC: 10 MG/DL (ref 7–18)
BUN/CREAT SERPL: 14.5 (ref 10–20)
CALCIUM BLD-MCNC: 8.9 MG/DL (ref 8.5–10.1)
CHLORIDE SERPL-SCNC: 107 MMOL/L (ref 98–112)
CO2 SERPL-SCNC: 26 MMOL/L (ref 21–32)
CREAT BLD-MCNC: 0.69 MG/DL
DEPRECATED HBV CORE AB SER IA-ACNC: 83.3 NG/ML
DEPRECATED RDW RBC AUTO: 41.4 FL (ref 35.1–46.3)
EGFRCR SERPLBLD CKD-EPI 2021: 113 ML/MIN/1.73M2 (ref 60–?)
ERYTHROCYTE [DISTWIDTH] IN BLOOD BY AUTOMATED COUNT: 11.7 % (ref 11–15)
FASTING STATUS PATIENT QL REPORTED: NO
FOLATE SERPL-MCNC: 14.2 NG/ML (ref 8.7–?)
GLOBULIN PLAS-MCNC: 3.4 G/DL (ref 2.8–4.4)
GLUCOSE BLD-MCNC: 74 MG/DL (ref 70–99)
HCT VFR BLD AUTO: 40.9 %
HGB BLD-MCNC: 13.6 G/DL
MCH RBC QN AUTO: 32 PG (ref 26–34)
MCHC RBC AUTO-ENTMCNC: 33.3 G/DL (ref 31–37)
MCV RBC AUTO: 96.2 FL
OSMOLALITY SERPL CALC.SUM OF ELEC: 290 MOSM/KG (ref 275–295)
PLATELET # BLD AUTO: 209 10(3)UL (ref 150–450)
POTASSIUM SERPL-SCNC: 3.8 MMOL/L (ref 3.5–5.1)
PROT SERPL-MCNC: 7.4 G/DL (ref 6.4–8.2)
RBC # BLD AUTO: 4.25 X10(6)UL
SODIUM SERPL-SCNC: 141 MMOL/L (ref 136–145)
THYROPEROXIDASE AB SERPL-ACNC: 40 U/ML (ref ?–60)
TSI SER-ACNC: 1.44 MIU/ML (ref 0.36–3.74)
VIT B12 SERPL-MCNC: 366 PG/ML (ref 193–986)
VIT D+METAB SERPL-MCNC: 29.5 NG/ML (ref 30–100)
WBC # BLD AUTO: 5.9 X10(3) UL (ref 4–11)

## 2023-08-28 PROCEDURE — 85027 COMPLETE CBC AUTOMATED: CPT

## 2023-08-28 PROCEDURE — 86376 MICROSOMAL ANTIBODY EACH: CPT

## 2023-08-28 PROCEDURE — 3008F BODY MASS INDEX DOCD: CPT

## 2023-08-28 PROCEDURE — 82306 VITAMIN D 25 HYDROXY: CPT

## 2023-08-28 PROCEDURE — 3074F SYST BP LT 130 MM HG: CPT

## 2023-08-28 PROCEDURE — 82728 ASSAY OF FERRITIN: CPT

## 2023-08-28 PROCEDURE — 3078F DIAST BP <80 MM HG: CPT

## 2023-08-28 PROCEDURE — 84425 ASSAY OF VITAMIN B-1: CPT

## 2023-08-28 PROCEDURE — 36415 COLL VENOUS BLD VENIPUNCTURE: CPT

## 2023-08-28 PROCEDURE — 84252 ASSAY OF VITAMIN B-2: CPT

## 2023-08-28 PROCEDURE — 82746 ASSAY OF FOLIC ACID SERUM: CPT

## 2023-08-28 PROCEDURE — 84443 ASSAY THYROID STIM HORMONE: CPT

## 2023-08-28 PROCEDURE — 99395 PREV VISIT EST AGE 18-39: CPT

## 2023-08-28 PROCEDURE — 82607 VITAMIN B-12: CPT

## 2023-08-28 PROCEDURE — 80053 COMPREHEN METABOLIC PANEL: CPT

## 2023-09-01 LAB
VITAMIN B1 WHOLE BLD: 83.6 NMOL/L
VITAMIN B2 WHL BLD: 218 UG/L

## 2023-09-14 ENCOUNTER — OFFICE VISIT (OUTPATIENT)
Dept: OTOLARYNGOLOGY | Facility: CLINIC | Age: 38
End: 2023-09-14

## 2023-09-14 VITALS — BODY MASS INDEX: 21 KG/M2 | WEIGHT: 125 LBS

## 2023-09-14 DIAGNOSIS — K14.8 DRY TONGUE: Primary | ICD-10-CM

## 2023-09-14 DIAGNOSIS — K14.6 BURNING TONGUE: ICD-10-CM

## 2023-09-14 RX ORDER — CLOTRIMAZOLE 10 MG/1
10 LOZENGE ORAL; TOPICAL
Qty: 50 TROCHE | Refills: 0 | Status: SHIPPED | OUTPATIENT
Start: 2023-09-14

## 2023-10-11 ENCOUNTER — LAB ENCOUNTER (OUTPATIENT)
Dept: LAB | Facility: HOSPITAL | Age: 38
End: 2023-10-11
Payer: COMMERCIAL

## 2023-10-11 DIAGNOSIS — Z00.00 ROUTINE GENERAL MEDICAL EXAMINATION AT A HEALTH CARE FACILITY: ICD-10-CM

## 2023-10-11 LAB
CHOLEST SERPL-MCNC: 153 MG/DL (ref ?–200)
FASTING PATIENT LIPID ANSWER: YES
HDLC SERPL-MCNC: 56 MG/DL (ref 40–59)
LDLC SERPL CALC-MCNC: 88 MG/DL (ref ?–100)
NONHDLC SERPL-MCNC: 97 MG/DL (ref ?–130)
TRIGL SERPL-MCNC: 41 MG/DL (ref 30–149)
VLDLC SERPL CALC-MCNC: 7 MG/DL (ref 0–30)

## 2023-10-11 PROCEDURE — 80061 LIPID PANEL: CPT

## 2023-10-11 PROCEDURE — 36415 COLL VENOUS BLD VENIPUNCTURE: CPT

## 2023-10-28 ENCOUNTER — NURSE TRIAGE (OUTPATIENT)
Dept: INTERNAL MEDICINE CLINIC | Facility: CLINIC | Age: 38
End: 2023-10-28

## 2023-10-28 NOTE — TELEPHONE ENCOUNTER
Please reply to pool: EM RN TRIAGE  Action Requested: Summary for Provider     []  Critical Lab, Recommendations Needed  [] Need Additional Advice  [x]   FYI    []   Need Orders  [] Need Medications Sent to Pharmacy  []  Other     SUMMARY: Patient contacts clinic reporting cough x 2 weeks. Previous URI, now with lingering cough and chest tightness. Denies fever, body aches, chills chest pain or shortness of breath when not coughing. Home covid test negative. She has previous prescriptions for benzonatate and albuterol inhaler that were prescribed for similar symptoms. RN advised caution of using old prescriptions and over medicating prior to being seen. She verbalized understanding. Acute visit booked 10/30. Advised IC this weekend if symptoms progress.     Reason for call: Acute  Onset: Data Unavailable                       Reason for Disposition   Cough has been present for > 3 weeks    Protocols used: Cough-A-OH

## 2023-10-30 ENCOUNTER — OFFICE VISIT (OUTPATIENT)
Dept: INTERNAL MEDICINE CLINIC | Facility: CLINIC | Age: 38
End: 2023-10-30

## 2023-10-30 VITALS
DIASTOLIC BLOOD PRESSURE: 78 MMHG | BODY MASS INDEX: 21.02 KG/M2 | HEIGHT: 65 IN | HEART RATE: 63 BPM | OXYGEN SATURATION: 99 % | WEIGHT: 126.19 LBS | TEMPERATURE: 97 F | SYSTOLIC BLOOD PRESSURE: 100 MMHG

## 2023-10-30 DIAGNOSIS — R05.1 ACUTE COUGH: Primary | ICD-10-CM

## 2023-10-30 PROCEDURE — 3074F SYST BP LT 130 MM HG: CPT | Performed by: NURSE PRACTITIONER

## 2023-10-30 PROCEDURE — 99213 OFFICE O/P EST LOW 20 MIN: CPT | Performed by: NURSE PRACTITIONER

## 2023-10-30 PROCEDURE — 3008F BODY MASS INDEX DOCD: CPT | Performed by: NURSE PRACTITIONER

## 2023-10-30 PROCEDURE — 3078F DIAST BP <80 MM HG: CPT | Performed by: NURSE PRACTITIONER

## 2023-10-30 RX ORDER — BENZONATATE 100 MG/1
100 CAPSULE ORAL 3 TIMES DAILY PRN
Qty: 20 CAPSULE | Refills: 0 | Status: SHIPPED | OUTPATIENT
Start: 2023-10-30 | End: 2023-11-06

## 2023-10-30 NOTE — ASSESSMENT & PLAN NOTE
Allergy Treatment Plan  -Take otc allergy medications as directed (over the counter, generic Claritin, Zyrtec, Xyzal or Allegra)     -use of steroidal nasal spray as advised (Flonase, Rhinocort)-this is now available as a generic, over the counter spray if your insurance doesn't cover it      used every morning faithfully each morning during the allergy season is the BEST treatment; they are very safe for use over a period of 6-7 months (April - October)     -over the counter allergy eye drops-Zaditor (ketotifen) 1 drop twice a day works very well for eye symptoms     -keep windows closed at night     -do not allow pets to sleep in room     -use allergen covers on pillows, mattress and box spring to reduce exposure to dust mites     -use of Simply Saline Spray     -shower after outdoor activities, especially when allergy counts are high     -Use of allergen filters for furnace; consider air purifier if allergies are significant at night     -Call  If symptoms do not improve, worsen or with other questions or concerns     benzonatate 100 MG Oral Cap Take 1 capsule (100 mg total) by mouth 3 (three) times daily as needed for cough.  20 capsule

## 2023-10-30 NOTE — PATIENT INSTRUCTIONS
Allergy Treatment Plan  -Take otc allergy medications as directed (over the counter, generic Claritin, Zyrtec, Xyzal or Allegra)     -use of steroidal nasal spray as advised (Flonase, Rhinocort)-this is now available as a generic, over the counter spray if your insurance doesn't cover it      used every morning faithfully each morning during the allergy season is the BEST treatment; they are very safe for use over a period of 6-7 months (April - October)     -over the counter allergy eye drops-Zaditor (ketotifen) 1 drop twice a day works very well for eye symptoms     -keep windows closed at night     -do not allow pets to sleep in room     -use allergen covers on pillows, mattress and box spring to reduce exposure to dust mites     -use of Simply Saline Spray     -shower after outdoor activities, especially when allergy counts are high     -Use of allergen filters for furnace; consider air purifier if allergies are significant at night     -Call  If symptoms do not improve, worsen or with other questions or concerns

## 2023-11-02 ENCOUNTER — TELEPHONE (OUTPATIENT)
Dept: INTERNAL MEDICINE CLINIC | Facility: CLINIC | Age: 38
End: 2023-11-02

## 2023-11-02 ENCOUNTER — TELEPHONE (OUTPATIENT)
Dept: ALLERGY | Facility: CLINIC | Age: 38
End: 2023-11-02

## 2023-11-02 RX ORDER — BUDESONIDE AND FORMOTEROL FUMARATE DIHYDRATE 80; 4.5 UG/1; UG/1
2 AEROSOL RESPIRATORY (INHALATION) 2 TIMES DAILY
Qty: 1 EACH | Refills: 0 | Status: SHIPPED | OUTPATIENT
Start: 2023-11-02

## 2023-11-02 RX ORDER — ALBUTEROL SULFATE 90 UG/1
2 AEROSOL, METERED RESPIRATORY (INHALATION) EVERY 6 HOURS PRN
Qty: 1 EACH | Refills: 0 | Status: SHIPPED | OUTPATIENT
Start: 2023-11-02

## 2023-11-02 NOTE — TELEPHONE ENCOUNTER
Patient is calling to see if she can get a sooner appointment than the one scheduled for 1/29/24 and on the wait list.  Patient has a persistent cough that was advised by PCP it might be allergy related.   Is Provider able to see patient before the 1/29/24 appt.    Please advise.

## 2023-11-02 NOTE — TELEPHONE ENCOUNTER
Patient calling again, confirmed name and . She is asking if Denys Murphy has had time review notes below. She asks if she needs an inhaler. She is able to speak clearly on the phone but coughs frequently. She complains that the coughing causes a headache to develop. She explains that she is hesitant to take prednisone because it causes her to have difficulty sleeping.

## 2023-11-02 NOTE — TELEPHONE ENCOUNTER
Patient called.     Continues with persistent cough    Plan  Steroid inhaler  Albuterol Inhaler    Rachelle Cohen, SABIAN

## 2023-11-02 NOTE — TELEPHONE ENCOUNTER
Minerva Speaks:  patient seeking a different medication. She feels she may need an inhaler type of medication. Coughing continues;  feels like a consistent inflammation upper airway. Is coughing over the phone; \"sounds honking cough\"; worse when talking or when sleeping. Seen in office 10/30/23. Has tried antihistamines and benzonatate  Nothing is helping. No wheezing. No chest tightness. No fever. Has asthma;   Does NOT want prednisone.

## 2023-11-02 NOTE — TELEPHONE ENCOUNTER
Spoke with patient. Verified name and . Patient is requesting an earlier appointment than 2024 for her cough. Offered patient next available appointment with Dr. Michaels which is a video visit on Wednesday,23 at 3:45 pm. Patient states she does not wish to wait that long and will contact her PCP and hung up the phone.    Last office visit was 22.

## 2023-11-05 ENCOUNTER — TELEPHONE (OUTPATIENT)
Dept: PHYSICAL MEDICINE AND REHAB | Facility: CLINIC | Age: 38
End: 2023-11-05

## 2023-11-05 ENCOUNTER — MOBILE ENCOUNTER (OUTPATIENT)
Dept: PHYSICAL MEDICINE AND REHAB | Facility: CLINIC | Age: 38
End: 2023-11-05

## 2023-11-05 DIAGNOSIS — M51.9 LUMBAR DISC DISEASE: ICD-10-CM

## 2023-11-05 DIAGNOSIS — M48.061 LUMBAR FORAMINAL STENOSIS: Primary | ICD-10-CM

## 2023-11-05 DIAGNOSIS — M41.125 ADOLESCENT IDIOPATHIC SCOLIOSIS OF THORACOLUMBAR REGION: ICD-10-CM

## 2023-11-05 RX ORDER — METHYLPREDNISOLONE 4 MG/1
TABLET ORAL
Qty: 1 EACH | Refills: 0 | Status: SHIPPED | OUTPATIENT
Start: 2023-11-05 | End: 2023-11-05

## 2023-11-05 RX ORDER — METHYLPREDNISOLONE 4 MG/1
TABLET ORAL
Qty: 1 EACH | Refills: 0 | Status: SHIPPED | OUTPATIENT
Start: 2023-11-05 | End: 2023-11-16 | Stop reason: ALTCHOICE

## 2023-11-05 NOTE — TELEPHONE ENCOUNTER
Paged due to acute on chronic low back pain without radicular symptoms, no red flags of fever chills, weakness, b/b incontinence. Also no numbness, tingling in the legs or the feet. Has had similar flare ups before. Asking for medrol dosepack. Medication sent to pharmacy; if going to star the medication today she should take 3 tablets as soon as she gets the prescription and then 3 tablets in the evening, then follow the rest of the usual instructions.      Julio Yang DO  Physical Medicine and [address]

## 2023-11-06 ENCOUNTER — TELEPHONE (OUTPATIENT)
Dept: NEUROLOGY | Facility: CLINIC | Age: 38
End: 2023-11-06

## 2023-11-06 NOTE — TELEPHONE ENCOUNTER
Left detailed message informing patient of below recommendations. Advised patient to call our office back if she is unable to complete the MRI prior to her upcoming appointment.

## 2023-11-06 NOTE — TELEPHONE ENCOUNTER
Reviewing 's schedule to see where patient can be scheduled within the next 1-2 weeks. Spoke with patient and scheduled appt for 11/16/23. Patient stated since 2/2032 her back has just not been the same. States her las bad flare up before this was in 2009. States she is having constant irritation to the left L5-S1 area. Patient last completed PT from 3/2023-5/2023. She has also had a chronic cough that has not been helping her back pain. Patient wondering if Kumar Hinson would want to order an updated MRI for her to complete prior to her appointment. Last MRI was completed in 2019. Informed patient update will be forwarded on to Kumar Hinson to advise. Awaiting on feedback from Kumar Hinson.

## 2023-11-06 NOTE — TELEPHONE ENCOUNTER
Patient needs to schedule on apt with Monroe Santana urgently as she had a flare up low back pain, she states she spoke with the un called physician  on 10/5/23 prescribed some medication but was advised to reach out to 's office and schedule an appointment.

## 2023-11-07 ENCOUNTER — OFFICE VISIT (OUTPATIENT)
Dept: INTERNAL MEDICINE CLINIC | Facility: CLINIC | Age: 38
End: 2023-11-07

## 2023-11-07 VITALS
HEIGHT: 65 IN | TEMPERATURE: 98 F | BODY MASS INDEX: 21.19 KG/M2 | HEART RATE: 89 BPM | SYSTOLIC BLOOD PRESSURE: 110 MMHG | WEIGHT: 127.19 LBS | DIASTOLIC BLOOD PRESSURE: 77 MMHG

## 2023-11-07 DIAGNOSIS — R05.1 ACUTE COUGH: Primary | ICD-10-CM

## 2023-11-07 PROCEDURE — 3078F DIAST BP <80 MM HG: CPT | Performed by: NURSE PRACTITIONER

## 2023-11-07 PROCEDURE — 3074F SYST BP LT 130 MM HG: CPT | Performed by: NURSE PRACTITIONER

## 2023-11-07 PROCEDURE — 3008F BODY MASS INDEX DOCD: CPT | Performed by: NURSE PRACTITIONER

## 2023-11-07 PROCEDURE — 99213 OFFICE O/P EST LOW 20 MIN: CPT | Performed by: NURSE PRACTITIONER

## 2023-11-07 RX ORDER — AMOXICILLIN 500 MG/1
500 CAPSULE ORAL 3 TIMES DAILY
Qty: 30 CAPSULE | Refills: 0 | Status: SHIPPED | OUTPATIENT
Start: 2023-11-07 | End: 2023-11-17

## 2023-11-08 NOTE — ASSESSMENT & PLAN NOTE
Cough/Headache  She does not want Augmentin but wants Amoxicillin. Plan  amoxicillin 500 MG Oral Cap Take 1 capsule (500 mg total) by mouth 3 (three) times daily for 10 days.  30 capsule

## 2023-11-13 ENCOUNTER — TELEPHONE (OUTPATIENT)
Dept: PHYSICAL MEDICINE AND REHAB | Facility: CLINIC | Age: 38
End: 2023-11-13

## 2023-11-13 ENCOUNTER — MED REC SCAN ONLY (OUTPATIENT)
Dept: PHYSICAL MEDICINE AND REHAB | Facility: CLINIC | Age: 38
End: 2023-11-13

## 2023-11-14 NOTE — TELEPHONE ENCOUNTER
Received report for MRI of Lumbar Spine without contrast made a copy and placed for scanning and another copy was placed on Dr Barnes desemery for review. Sent a iFollohart message to the patient so she can bring the disc with images.

## 2023-11-16 ENCOUNTER — OFFICE VISIT (OUTPATIENT)
Dept: PHYSICAL MEDICINE AND REHAB | Facility: CLINIC | Age: 38
End: 2023-11-16
Payer: COMMERCIAL

## 2023-11-16 VITALS — HEART RATE: 68 BPM | HEIGHT: 65 IN | WEIGHT: 127 LBS | BODY MASS INDEX: 21.16 KG/M2 | OXYGEN SATURATION: 98 %

## 2023-11-16 DIAGNOSIS — M48.061 LUMBAR FORAMINAL STENOSIS: ICD-10-CM

## 2023-11-16 DIAGNOSIS — N85.6 ASHERMAN'S SYNDROME: ICD-10-CM

## 2023-11-16 DIAGNOSIS — M41.125 ADOLESCENT IDIOPATHIC SCOLIOSIS OF THORACOLUMBAR REGION: ICD-10-CM

## 2023-11-16 DIAGNOSIS — M51.9 LUMBAR DISC DISEASE: Primary | ICD-10-CM

## 2023-11-16 DIAGNOSIS — M51.26 LUMBAR HERNIATED DISC: ICD-10-CM

## 2023-11-16 DIAGNOSIS — F90.0 ATTENTION DEFICIT HYPERACTIVITY DISORDER (ADHD), PREDOMINANTLY INATTENTIVE TYPE: ICD-10-CM

## 2023-11-16 PROCEDURE — 99214 OFFICE O/P EST MOD 30 MIN: CPT | Performed by: PHYSICAL MEDICINE & REHABILITATION

## 2023-11-16 PROCEDURE — 3008F BODY MASS INDEX DOCD: CPT | Performed by: PHYSICAL MEDICINE & REHABILITATION

## 2023-11-16 NOTE — PROGRESS NOTES
Low Back Pain H & P    Chief Complaint:   Chief Complaint   Patient presents with    Follow - Up     LOV: 2/15/2023 Patient f/u on MRI Lumbar results. C/o bilateral low back pain may radiates to lateral legs, denies N/T. Completed steroid pack with relief. Rates pain 2/10     Nursing note reviewed and verified. Patient was last seen on 2/15/2023. Since February, she has never gotten to her pre-flare up status. She did the PT with [facility] which helped some. Then she had a flare up again at the beginning of this month and was prescribed a Medrol Dosepak which decreased her symptoms back to where they were before the flare up. She is working out regularly and she will still have the flare ups of the pain. She is better with movement. She was sick and had a bad cough which was the start of her recent flare up. Description of the Pain  The pain is located in the left low back. The pain radiates to the right > left buttock. .    Past Medical History   Past Medical History:   Diagnosis Date    Chronic pain of both knees 07/25/2018    Formatting of this note might be different from the original. Last Assessment & Plan: She is training for marathon, notes pain in her knees that is interfering with her progress. No associated swelling or joint instability. Plan:  Referred for PT for further evaluation and management. Crushing injury of toe of right foot 09/09/2015    Esophageal reflux     Eustachian tube dysfunction, bilateral 11/11/2019    Lumbar disc herniation 02/15/2011    Lumbar radicular pain 02/15/2011    Tension-type headache, not intractable 07/25/2018    Formatting of this note might be different from the original. Last Assessment & Plan: Off and on headaches for the last 2-3 weeks, predominantly around her temples, without associated visual change, nausea, vomiting, or light sensitivity. Minimal caffeine intake.   Some increased stress, lost a friend to cancer, going through process of possibly having third child through surrogate. Training for        Past Surgical History   Past Surgical History:   Procedure Laterality Date          ENLARGE BREAST WITH IMPLANT      ENLARGE BREAST WITH IMPLANT         Family History   History reviewed. No pertinent family history. Social History   Social History     Socioeconomic History    Marital status:      Spouse name: Not on file    Number of children: Not on file    Years of education: Not on file    Highest education level: Not on file   Occupational History    Not on file   Tobacco Use    Smoking status: Never    Smokeless tobacco: Never    Tobacco comments:     social in college    Vaping Use    Vaping Use: Never used   Substance and Sexual Activity    Alcohol use: Never    Drug use: Never    Sexual activity: Not on file   Other Topics Concern    Not on file   Social History Narrative    Not on file     Social Determinants of Health     Financial Resource Strain: Not on file   Food Insecurity: Not on file   Transportation Needs: Not on file   Physical Activity: Not on file   Stress: Not on file   Social Connections: Not on file   Housing Stability: Not on file       PE:  The patient does appear in her stated age in no distress. The patient is well groomed. Psychiatric:  The patient is alert and oriented x 3. The patient has a normal affect and mood. Respiratory:  No acute respiratory distress. Patient does not have a cough. HEENT:  Extraocular muscles are intact. There is no kern icterus. Pupils are equal, round, and reactive to light. No redness or discharge bilaterally. Skin:  There are no rashes or lesions.     Vitals:  Vitals:    23 0816   Pulse: 68       Gait:    Gait: Normal gait   Sit to Stand: no difficulty      Vascular lower extremity:   Dorsalis pedis pulse-RIGHT 2+   Dorsalis pedis pulse-LEFT 2+   Tibialis posterior pulse-RIGHT 2+   Tibialis posterior pulse-LEFT 2+     Neurological Lower Extremity: Light Touch Sensation: Intact in bilateral Lower Extremities   LE Muscle Strength: All LE strength measurements 5/5   RIGHT plantar reflexes: downward response   LEFT plantar reflexes: downward response   Reflexes: 2+ in bilateral lower extremities     Radiology Imaging:  I reviewed with the patient her MRI of the lumbar spine from 11/13/2023. Assessment  1. L5-S1 left mod foraminal & mild-mod diffuse, L4-5 right foraminal & right lateral recess mild-mod, L3-4 mild central bulging discs    2. L5-S1 left mild-mod/right mild, L4-5 right mild foraminal stenosis    3. Attention deficit hyperactivity disorder (ADHD), predominantly inattentive type    4. Asherman's syndrome    5. Adolescent idiopathic scoliosis of thoracolumbar region       Plan  She will see Mio Chris for the PT. She will se the Chiropractor as needed. The patient does not need any pain medications at this time. She will take NSIAD's as needed. The patient will follow up in 6 months, but the patient will call me 2 weeks after having the injection to let me know how the injection worked. The patient understands and agrees with the stated plan. Eliot Kyle MD  11/16/2023

## 2023-11-16 NOTE — PATIENT INSTRUCTIONS
Plan  She will see Rich Chaparro for the PT. She will se the Chiropractor as needed. The patient does not need any pain medications at this time. She will take NSIAD's as needed. The patient will follow up in 6 months, but the patient will call me 2 weeks after having the injection to let me know how the injection worked.

## 2023-11-17 ENCOUNTER — TELEPHONE (OUTPATIENT)
Dept: INTERNAL MEDICINE CLINIC | Facility: CLINIC | Age: 38
End: 2023-11-17

## 2023-11-17 ENCOUNTER — HOSPITAL ENCOUNTER (OUTPATIENT)
Dept: GENERAL RADIOLOGY | Facility: HOSPITAL | Age: 38
Discharge: HOME | End: 2023-11-17
Attending: NURSE PRACTITIONER
Payer: COMMERCIAL

## 2023-11-17 ENCOUNTER — TELEMEDICINE (OUTPATIENT)
Dept: INTERNAL MEDICINE CLINIC | Facility: CLINIC | Age: 38
End: 2023-11-17
Payer: COMMERCIAL

## 2023-11-17 ENCOUNTER — PATIENT MESSAGE (OUTPATIENT)
Dept: INTERNAL MEDICINE CLINIC | Facility: CLINIC | Age: 38
End: 2023-11-17

## 2023-11-17 DIAGNOSIS — R05.1 ACUTE COUGH: ICD-10-CM

## 2023-11-17 DIAGNOSIS — R05.1 ACUTE COUGH: Primary | ICD-10-CM

## 2023-11-17 PROCEDURE — 71046 X-RAY EXAM CHEST 2 VIEWS: CPT | Performed by: NURSE PRACTITIONER

## 2023-11-17 PROCEDURE — 99213 OFFICE O/P EST LOW 20 MIN: CPT | Performed by: NURSE PRACTITIONER

## 2023-11-17 NOTE — TELEPHONE ENCOUNTER
Patient seen for OV 11/7 for a cough. States cough has been ongoing for 6 weeks. Patient prescribed amoxicillin 500 mg which states helped and improved cough; however since yesterday patient has noticed the cough is coming back. Denies: fever, SOB, chest pain  Patient calling to request more antibiotics. Advised message would have to be sent to provider, but provider was out of the office. Appointment was offered and patient agreed to an appointment.    Future Appointments   Date Time Provider Emanuel Alicia   11/17/2023  3:00 PM RAVINDRA [facility]   1/29/2024  9:45 AM Brett Neil MD [facility]

## 2023-11-17 NOTE — PROGRESS NOTES
Virtual Visit Check-In    [facility] or legal guardian   verbally consents to a Virtual Visit Check-In service on 11/17/2023    Patient understands and accepts financial responsibility for any deductible, co-insurance and/or co-pays associated with this service. Duration of the service:   Call duration 10 minutes   Video visit     Chief Complaint   Patient presents with    Cough       HPI:    Patient ID: Thomas Fowler is a 43year old female. She presents for follow up. She was seen in office on 11/7 due to having an acute cough. She was prescribed amoxicillin 500 mg TID x 10 days. She did have some improvement in her symptoms but she feels her cough is now returning. She was treat with a medrol dose pack for lower back pain from physiatry. She is currently taking amoxicillin she did notice an improvement on day 5 but now her cough is returning despite still being on antibiotic therapy. She denies any fevers, chest pain or SOB. ROS    Review of Systems   Constitutional:  Negative for fever. HENT:  Negative for congestion. Respiratory:  Positive for cough. Cardiovascular:  Negative for chest pain. Gastrointestinal:  Negative for abdominal pain. Genitourinary: Negative. Musculoskeletal:  Negative for arthralgias. Skin: Negative. Neurological:  Negative for headaches. Psychiatric/Behavioral: Negative. Current Outpatient Medications   Medication Sig Dispense Refill    albuterol 108 (90 Base) MCG/ACT Inhalation Aero Soln Inhale 2 puffs into the lungs every 6 (six) hours as needed for Wheezing (cough). (Patient not taking: Reported on 11/16/2023) 1 each 0    amoxicillin 500 MG Oral Cap Take 1 capsule (500 mg total) by mouth 3 (three) times daily for 10 days. 30 capsule 0    Budesonide-Formoterol Fumarate 80-4.5 MCG/ACT Inhalation Aerosol Inhale 2 puffs into the lungs 2 (two) times daily.  (Patient not taking: Reported on 11/16/2023) 1 each 0    clotrimazole 10 MG Mouth/Throat Manjinder Take 1 lozenge (10 mg total) by mouth 5 (five) times daily. (Patient not taking: Reported on 11/7/2023) 50 Manjinder 0       Allergies: Allergies   Allergen Reactions    Sulfa Antibiotics RASH, HIVES and OTHER (SEE COMMENTS)     Cerner Allergy Text Annotation: sulfa drugs, Cerner Reaction: rash      Sulfamethoxazole W/Trimethoprim HIVES    Kiwi Extract ITCHING          Current Outpatient Medications:     albuterol 108 (90 Base) MCG/ACT Inhalation Aero Soln, Inhale 2 puffs into the lungs every 6 (six) hours as needed for Wheezing (cough). (Patient not taking: Reported on 11/16/2023), Disp: 1 each, Rfl: 0    amoxicillin 500 MG Oral Cap, Take 1 capsule (500 mg total) by mouth 3 (three) times daily for 10 days. , Disp: 30 capsule, Rfl: 0    Budesonide-Formoterol Fumarate 80-4.5 MCG/ACT Inhalation Aerosol, Inhale 2 puffs into the lungs 2 (two) times daily. (Patient not taking: Reported on 11/16/2023), Disp: 1 each, Rfl: 0    clotrimazole 10 MG Mouth/Throat Manjinder, Take 1 lozenge (10 mg total) by mouth 5 (five) times daily. (Patient not taking: Reported on 11/7/2023), Disp: 50 Manjinder, Rfl: 0    Past Medical History:   Diagnosis Date    Chronic pain of both knees 07/25/2018    Formatting of this note might be different from the original. Last Assessment & Plan: She is training for marathon, notes pain in her knees that is interfering with her progress. No associated swelling or joint instability. Plan:  Referred for PT for further evaluation and management.     Crushing injury of toe of right foot 09/09/2015    Esophageal reflux     Eustachian tube dysfunction, bilateral 11/11/2019    Lumbar disc herniation 02/15/2011    Lumbar radicular pain 02/15/2011    Tension-type headache, not intractable 07/25/2018    Formatting of this note might be different from the original. Last Assessment & Plan: Off and on headaches for the last 2-3 weeks, predominantly around her temples, without associated visual change, nausea, vomiting, or light sensitivity. Minimal caffeine intake. Some increased stress, lost a friend to cancer, going through process of possibly having third child through surrogate. Training for          PHYSICAL EXAM:     Vitals signs taken at home if available:    Limited examination for this telephone visit due to the coronavirus emergency    Patient was speaking in complete sentences, no increased work of breathing and very coherent and alert on the phone. Alert and oriented x 3  Patient was responding to questions appropriately. Patient did not appear short of breath. ASSESSMENT/PLAN:     Encounter Diagnosis   Name Primary? Acute cough Yes       1. Acute cough  - per patient she did not notice any improvement of her symptoms with inhalers or tessalon pearls  - she is currently taking amoxicillin and she feels her symptoms are worsening   - dw patient we will order a chest x-ray and determine POC based on results   - based on her symptoms I feel she has bronchitis and would benefit from prednisone and or using her inhaler previously prescribed. - XR CHEST PA + LAT CHEST (CPT=71046); Future      Patient reminded to practice good health and safety measures including washing hands, social distancing, covering mouth when coughing/ sneezing, avoid social meetings/ gatherings in face of this Covid 19 pandemic. Patient verbalized understanding of plan and all questions answered to the best of my ability. Patient to call back if any change/ worsening of symptoms. No orders of the defined types were placed in this encounter. Meds This Visit:  Requested Prescriptions      No prescriptions requested or ordered in this encounter       Imaging & Referrals:  None               Rosalie Tsai, RAVINDRA  11/17/2023  3:01 PM      Please note that the following visit was completed using two-way, real-time interactive audio and/or video communication.   This has been done in good katie to provide continuity of care in the best interest of the provider-patient relationship, due to the ongoing public health crisis/national emergency and because of restrictions of visitation. There are limitations of this visit as no physical exam could be performed. Every conscious effort was taken to allow for sufficient and adequate time. This billing was spent on reviewing labs, medications, radiology tests and decision making.   Appropriate medical decision-making and tests are ordered as detailed in the plan of care above  PB#[id number]

## 2023-11-18 ENCOUNTER — TELEPHONE (OUTPATIENT)
Dept: INTERNAL MEDICINE CLINIC | Facility: CLINIC | Age: 38
End: 2023-11-18

## 2023-11-18 DIAGNOSIS — M48.061 LUMBAR FORAMINAL STENOSIS: ICD-10-CM

## 2023-11-18 DIAGNOSIS — M51.9 LUMBAR DISC DISEASE: ICD-10-CM

## 2023-11-18 RX ORDER — PREDNISONE 20 MG/1
20 TABLET ORAL 2 TIMES DAILY
Qty: 10 TABLET | Refills: 0 | Status: SHIPPED | OUTPATIENT
Start: 2023-11-18 | End: 2023-11-23

## 2023-11-18 RX ORDER — AZITHROMYCIN 250 MG/1
TABLET, FILM COATED ORAL
Qty: 6 TABLET | Refills: 0 | Status: SHIPPED | OUTPATIENT
Start: 2023-11-18 | End: 2023-11-20

## 2023-11-18 RX ORDER — METHYLPREDNISOLONE 4 MG/1
TABLET ORAL
Qty: 1 EACH | Refills: 0 | Status: SHIPPED | OUTPATIENT
Start: 2023-11-18 | End: 2023-11-18

## 2023-11-18 NOTE — TELEPHONE ENCOUNTER
[facility] pharmacy was notified that patient should be taking prednisone 20 mg oral tablets twice daily quantity 10

## 2023-11-18 NOTE — TELEPHONE ENCOUNTER
Patient advised of 's notes. Patient verbalized understanding. Appointment scheduled for Monday. Patient states REYNALDO Aldana told her that she would send in prednisone. Please advise. Patient also states she already has tessalon perles and they don't help.              11/17/23 IMAGING XRAY ; Hi Autumn,     Your chest x-ray is normal. You do not need additional antibiotics. Let me know if you are ok with taking prednisone to help with the cough.      Jacky JACOBS   Written by RAVINDRA King on 11/17/2023  6:01 PM CST  Seen by patient Rubi Sharp on 11/17/2023  9:27 PM

## 2023-11-18 NOTE — TELEPHONE ENCOUNTER
Medications ordered per 's written instructions. [facility] pharmacy is on a lunch break. Message left on [facility] pharmacy voicemail to disregard the Medrol therapy pack that was sent earlier and clarified patient should be on prednisone 20mg twice a day for 5 days, quantity of 10    Patient was notified and verbalized understanding.

## 2023-11-18 NOTE — TELEPHONE ENCOUNTER
She needs to be seen in clinic for reeval ;  . She has normal cxr eyesterday and  as she said her sinus symtoms are coming back causing possibly her coughing. Would recommend starting her on flonase NS which is otc and continue her antihistamine she is taking. This may also just be postinfectious cough that may last for  6weeks or more sometimes. We can give her tessalon perles 100mg one cap po TID 30 to help her cough. Ffupw  NP Neil Trevino early next week and she can decide if pt warrants more abx. ER or IC if worsens over the weekend.

## 2023-11-18 NOTE — TELEPHONE ENCOUNTER
Patient notified, she states \"I just finished a medrol dose pack about a week ago. I believe Jones Vivar said she was going to put me on prednisone 2 pills a day. \" Patient is also asking for stronger antibiotic, specifically a zpack.

## 2023-11-18 NOTE — TELEPHONE ENCOUNTER
Cancel the medrol dospak    Start prednisone 20mg tab  #10 one tab po bid   Ok to give her zpak but ffup with NP Shirlyn Litten  Pt should not be pregnant.

## 2023-11-20 ENCOUNTER — OFFICE VISIT (OUTPATIENT)
Dept: INTERNAL MEDICINE CLINIC | Facility: CLINIC | Age: 38
End: 2023-11-20

## 2023-11-20 VITALS
OXYGEN SATURATION: 98 % | HEART RATE: 83 BPM | SYSTOLIC BLOOD PRESSURE: 103 MMHG | TEMPERATURE: 98 F | BODY MASS INDEX: 21.16 KG/M2 | WEIGHT: 127 LBS | DIASTOLIC BLOOD PRESSURE: 78 MMHG | HEIGHT: 65 IN

## 2023-11-20 DIAGNOSIS — R05.3 CHRONIC COUGH: Primary | ICD-10-CM

## 2023-11-20 PROCEDURE — 3074F SYST BP LT 130 MM HG: CPT | Performed by: NURSE PRACTITIONER

## 2023-11-20 PROCEDURE — 3008F BODY MASS INDEX DOCD: CPT | Performed by: NURSE PRACTITIONER

## 2023-11-20 PROCEDURE — 3078F DIAST BP <80 MM HG: CPT | Performed by: NURSE PRACTITIONER

## 2023-11-20 PROCEDURE — 99213 OFFICE O/P EST LOW 20 MIN: CPT | Performed by: NURSE PRACTITIONER

## 2023-11-20 RX ORDER — AZITHROMYCIN 250 MG/1
250 TABLET, FILM COATED ORAL DAILY
COMMUNITY

## 2023-11-20 NOTE — ASSESSMENT & PLAN NOTE
Patient states she has been coughing since August.    She talked with Dr. Darlyn Doty over the weekend and he prescribed   ZPAK/ Prednisone. She has not taken this yet. Plan  Follow , Treatment plan  Mychart message me next week if not feeling better for further testing.

## 2023-11-27 ENCOUNTER — TELEPHONE (OUTPATIENT)
Dept: PHYSICAL MEDICINE AND REHAB | Facility: CLINIC | Age: 38
End: 2023-11-27

## 2023-11-27 NOTE — TELEPHONE ENCOUNTER
Pt called stating she was trying to get into seeing Tere Been in PT as per dr Terry Charles instructions but wasn't able to schedule an apt till the new year. She did mention speaking with someone about possibly getting in sooner, but hasn't heard anything. Please advise.

## 2023-11-28 ENCOUNTER — OFFICE VISIT (OUTPATIENT)
Dept: PHYSICAL THERAPY | Facility: HOSPITAL | Age: 38
End: 2023-11-28
Attending: PHYSICAL MEDICINE & REHABILITATION
Payer: COMMERCIAL

## 2023-11-28 ENCOUNTER — TELEPHONE (OUTPATIENT)
Dept: PHYSICAL THERAPY | Facility: HOSPITAL | Age: 38
End: 2023-11-28

## 2023-11-28 DIAGNOSIS — M48.061 LUMBAR FORAMINAL STENOSIS: ICD-10-CM

## 2023-11-28 DIAGNOSIS — N85.6 ASHERMAN'S SYNDROME: ICD-10-CM

## 2023-11-28 DIAGNOSIS — M51.26 LUMBAR HERNIATED DISC: ICD-10-CM

## 2023-11-28 DIAGNOSIS — F90.0 ATTENTION DEFICIT HYPERACTIVITY DISORDER (ADHD), PREDOMINANTLY INATTENTIVE TYPE: ICD-10-CM

## 2023-11-28 DIAGNOSIS — M51.9 LUMBAR DISC DISEASE: Primary | ICD-10-CM

## 2023-11-28 DIAGNOSIS — M41.125 ADOLESCENT IDIOPATHIC SCOLIOSIS OF THORACOLUMBAR REGION: ICD-10-CM

## 2023-11-28 PROCEDURE — 97530 THERAPEUTIC ACTIVITIES: CPT | Performed by: PHYSICAL THERAPIST

## 2023-11-28 PROCEDURE — 97110 THERAPEUTIC EXERCISES: CPT | Performed by: PHYSICAL THERAPIST

## 2023-11-28 PROCEDURE — 97162 PT EVAL MOD COMPLEX 30 MIN: CPT | Performed by: PHYSICAL THERAPIST

## 2023-11-28 NOTE — PROGRESS NOTES
LUMBAR SPINE EVALUATION:   Referring Physician: Dr. Gregor Gan  Diagnosis:   Lumbar disc disease (M51.9)  Lumbar foraminal stenosis (M48.061)  Attention deficit hyperactivity disorder (ADHD), predominantly inattentive type (F90.0)  Asherman's syndrome (N85.6)  Adolescent idiopathic scoliosis of thoracolumbar region (Y82.454)  Lumbar herniated disc (M51.26)     Date of Service: 2023   Date of Onset: several years ago    PATIENT SUMMARY   Mayra Dhillon is a 43year old y/o female who presents to therapy today with complaints of  L side LBP. Has had some intermittent sciatic pain into the R buttock and top of the hamstring. Denies falls/bladder/bowels issues. Just got a Medrol Dose Pack and Predisone/Zpack last week which helped her pain. History of current condition: long hx of LBP, first episode at 21years old - would have a bad flare up that would keep her in bed for a week. Had seen 3 surgeons about 10 years ago but avoided surgery. States her back will go out off and on. Started [employer], [place], [employer] . Delroy Caballero And went to to a chiropractor and did better. Last Feb had a bad flare up - took a Medrol Dose Pack. States her L side never full recovered. States she always has pain in her L side. This summer she would end up in full spasm after a busy fay. Lies on a heating pad which helps. States she is better in the morning. Had a cold Sept/Nov and she got worse from coughing. No injections recently. 6 y/o. 4 y/o and 3 y/o kids    Worked with [employer] - helped slightly. Was challenging to attend appointments. Pain Ratin-2/10 VAS,     Current functional limitations include limited ability to bend/lift/twist, unable to participate in workouts, unable to perform yoga. Lorri describes prior level of function independent in all activities. Pt goals include decrease pain, improve her overall function. Past medical history was reviewed with Lorri.  Significant findings include umbiliitcal hernia. Past Medical History:   Diagnosis Date    Chronic pain of both knees 2018    Formatting of this note might be different from the original. Last Assessment & Plan: She is training for marathon, notes pain in her knees that is interfering with her progress. No associated swelling or joint instability. Plan:  Referred for PT for further evaluation and management. Crushing injury of toe of right foot 2015    Esophageal reflux     Eustachian tube dysfunction, bilateral 2019    Lumbar disc herniation 02/15/2011    Lumbar radicular pain 02/15/2011    Tension-type headache, not intractable 2018    Formatting of this note might be different from the original. Last Assessment & Plan: Off and on headaches for the last 2-3 weeks, predominantly around her temples, without associated visual change, nausea, vomiting, or light sensitivity. Minimal caffeine intake. Some increased stress, lost a friend to cancer, going through process of possibly having third child through surrogate. Training for      Past Surgical History:   Procedure Laterality Date          ENLARGE BREAST WITH IMPLANT      ENLARGE BREAST WITH IMPLANT         Are you being hurt, frightened, demeaned, or taken advantage of by anyone at your home or in your life? No     Have you recently had thoughts of hurting yourself? No    Have you tried to hurt yourself in the past?  No      ASSESSMENT:   Mitch Rodriguez presents to therapy with a long hx of LBP that limits her ability to bend/lift/twist, participate in workouts/complete yoga classes. She presents with a complex pattern of postural compensations with a L AIC pattern so of postural compensation. She also displays systematic hypermobility, poor IO/TA recruitment with poor core strength and increased pain that limits her mobility.   She will benefit from skilled PT to decrease her pain and return to her PLOF.    Precautions:  scoliosis, ,       OBJECTIVE:   Observation/Posture: scoliosis curvature, anterior pelvic tilt  Gait: bilateral heel toe gait pattern without gross deviation  ROM:     Trunk         Pain (+/-)   Flexion Hands to floor                       Extension Decreased 25%  In the lower lumbar spine   R Sidebend WFL    L Sidebend WFL    R Rotation NT    L Rotation NT    R Sideglide WFL    L Sideglide WFL      Repeated Motion Testing: REIL - decreased/better    SANDRA Testing R L   Apical Expansion WFL WFL   Adduction Drop Test - +   Extension Drop Test - -   SLR 90 90   Trunk Rotation T NT   Posterior Mediastinum Expansion Test WFL decreased   Standing Reach Test - -   Elevated and ER Anterior Rib NT NT   PART + -   PADT - +   Hrusta Adduction Lift Test 4 4   Hrusta Abduction Lift Test NT NT       STRENGTH:   5/5 MMT Scale   Left  Right Comments   Hip Flexion (L2) 5     5    Knee Extension (L3) 5 5    Knee Flexion 5 5    Ankle DF (L4) 5 5    EHL (L5) 5 4+    Ankle PF (S1) 5 5    Hip Abduction NT NT    Hip Extension NT NT      Flexibility:      R L   Hamstrings long long   Piriformis long long   Hip Flexor long long     Neuro Screen: (-) heel walking and toe walking bilaterally    Special Tests: 4/5 functional squat test    Outcome Surverys  Oswestry Disability Index Score  Score: 8 % (2023  9:20 AM)        [facility] Treatment and Response:     2023  Visit #      Manual Therapy    Therapeutic Exercise Educated in centralization of symptoms and precautions for therapy   Therapeutic Activity    Neuromuscular Education    TNE Education    HEP        The pt was educated on the plan of care, purpose and individual goals for therapy, precautions for therapy. All questions were answered.      Charges: PT Eval x 1, TE1 (10), TA1 (15)    Total Timed treatment: 35 min      Total Treatment Time: 50 min       In agreement with FOTO score and clinical rationale, this evaluation involved Moderate Complexity decision making due to 3+ personal factors/comorbidities, 4+ body structures involved/activity limitations, and unstable symptoms including changing pain levels. PLAN OF CARE:    Goals: The pt was educated on the plan of care, purpose and individual goals for therapy, precautions for therapy. All questions were answered. 1.  The pt will be independent in their HEP. 2.  Centralization of symptoms to the lumbar spine. 3.  The pt will be independent in a modified workout program.  4.  The pt will demonstrate appropriate body mechanics with lifting an object from the ground. 5.  The pt will be able to reports a 50% decrease in symptoms. 6.  The pt will not have a recurrence of symptoms in 3 months. .    Frequency / Duration: Patient will be seen for 1-2 x/week or a total of 18 visits over a 90 day period. Treatment will include: Manual Therapy; Therapeutic Exercises; Neuromuscular Re-education; Therapeutic Activity; Patient education: Home exercise program instruction; TNE Education; Modalities as needed. Education or treatment limitation: None  Rehab Potential good    Patient was advised of these findings, precautions, and treatment options and has agreed to actively participate in planning and for this course of care. Thank you for your referral. Please co-sign or sign and return this letter via fax as soon as possible to [phone]. If you have any questions, please contact me at Dept: [phone]    Sincerely,  Electronically signed by therapist: Joi Tirado PT    [de-identified] certification required: Yes  I certify the need for these services furnished under this plan of treatment and while under my care.     X___________________________________________________ Date____________________    Certification From: 83/56/9322  To:2/26/2024

## 2023-11-30 ENCOUNTER — TELEPHONE (OUTPATIENT)
Dept: PHYSICAL THERAPY | Facility: HOSPITAL | Age: 38
End: 2023-11-30

## 2023-11-30 ENCOUNTER — TELEPHONE (OUTPATIENT)
Dept: INTERNAL MEDICINE CLINIC | Facility: CLINIC | Age: 38
End: 2023-11-30

## 2023-11-30 DIAGNOSIS — R05.3 CHRONIC COUGH: Primary | ICD-10-CM

## 2023-11-30 RX ORDER — BUDESONIDE AND FORMOTEROL FUMARATE DIHYDRATE 80; 4.5 UG/1; UG/1
2 AEROSOL RESPIRATORY (INHALATION) 2 TIMES DAILY
Qty: 10.2 EACH | Refills: 0 | OUTPATIENT
Start: 2023-11-30

## 2023-11-30 RX ORDER — BUDESONIDE AND FORMOTEROL FUMARATE DIHYDRATE 80; 4.5 UG/1; UG/1
2 AEROSOL RESPIRATORY (INHALATION) 2 TIMES DAILY
Qty: 1 EACH | Refills: 0 | Status: SHIPPED | OUTPATIENT
Start: 2023-11-30 | End: 2023-12-30

## 2023-11-30 NOTE — TELEPHONE ENCOUNTER
Patient scheduled for visit tomorrow as per below if it's needed but she asked message be sent since she had been seen several times for cough and next steps were kind of discussed at last visit with Susanna Young 11/20/23. Would you like to order pulmonary studies, refer to pulm at this time? Also, is there inhaler she should be using or taking daily? She is not opposed to this although was not sure what one is best and would need RX . No new symptoms, main complaint is cough and would like to know what next steps are. Did work out yesterday and she was fine although seems symptoms tend to get worse later in the evening. Patient calling today. Has been seen over the last couple months a few times for cough. Patient says at last visit if this was not resolving and staying away, Corrinne Sherry said she may want to order pulmonary studies to help look into this. After last dose of steroids and zpak she was feeling better on Thanksgiving but now that she is done with those she is starting to feel this cough come back. Xray a while back was negative (11/17/23). She reports that she is not wheezing but gets a tighter feeling in her upper chest and throat. She is too starting to wonder if this is an asthmatic cough for something else that is contributing to symptoms continuing. She herself has never been diagnosed with asthma but both her kids have it. Was given symbicort at one point although took it short term. Was not sure if this is something she should take long term. She does not take anything for allergies, recommended she try zyrtec, claritin or Zyxal OTC to see if this helps. Patient agreeable.        Future Appointments   Date Time Provider Emanuel Alicia   12/1/2023  9:20 AM RAVINDRA Escobar [facility]   12/12/2023 11:20 AM Chery Adame MD [facility]   12/22/2023  7:15 AM Khloe Pandya, PT [facility] NEURO PT EM [facility]   1/5/2024  7:15 AM Kortney Al, PT [facility] NEURO PT EM [facility]   1/8/2024  8:45 AM Washington Fret, PT [facility] NEURO PT EM [facility]   1/15/2024  8:00 AM Washington Fret, PT [facility] NEURO PT EM [facility]   1/19/2024  9:30 AM Washington Fret, PT [facility] NEURO PT EM [facility]   1/20/2024 11:30 AM Bianca Shankar [place] [facility]   1/22/2024  1:30 PM Washington Fret, PT [facility] NEURO PT EM [facility]   1/29/2024  1:30 PM Washington Fret, PT [facility] NEURO PT [facility]   2/5/2024  1:30 PM Washington Fret, PT [facility] NEURO PT [facility]   2/12/2024  1:30 PM Kristopher Kaye, PT [facility] NEURO PT [facility]

## 2023-12-01 ENCOUNTER — OFFICE VISIT (OUTPATIENT)
Dept: INTERNAL MEDICINE CLINIC | Facility: CLINIC | Age: 38
End: 2023-12-01

## 2023-12-01 VITALS
HEIGHT: 65 IN | HEART RATE: 89 BPM | BODY MASS INDEX: 21.16 KG/M2 | WEIGHT: 127 LBS | SYSTOLIC BLOOD PRESSURE: 94 MMHG | DIASTOLIC BLOOD PRESSURE: 65 MMHG | OXYGEN SATURATION: 99 %

## 2023-12-01 DIAGNOSIS — R05.3 CHRONIC COUGH: Primary | ICD-10-CM

## 2023-12-01 PROCEDURE — 3074F SYST BP LT 130 MM HG: CPT | Performed by: NURSE PRACTITIONER

## 2023-12-01 PROCEDURE — 99213 OFFICE O/P EST LOW 20 MIN: CPT | Performed by: NURSE PRACTITIONER

## 2023-12-01 PROCEDURE — 3008F BODY MASS INDEX DOCD: CPT | Performed by: NURSE PRACTITIONER

## 2023-12-01 PROCEDURE — 3078F DIAST BP <80 MM HG: CPT | Performed by: NURSE PRACTITIONER

## 2023-12-01 NOTE — TELEPHONE ENCOUNTER
Will start you on Symbicort two puffs twice a day. Order sent to your pharmacy.     I also order her PFTs    Please assist with scheduleing

## 2023-12-01 NOTE — TELEPHONE ENCOUNTER
Spoke with patient, (  Name and  verified ) informed of  RAVINDRA Mitchell  instructions below    She does have Symbicort at home, given instructions on  how often  to use the inhaler     Will discuss PFT with RAVINDRA Willett at the appointment tomorrow     Future Appointments   Date Time Provider Emanuel Alicia   2023  9:20 AM RAVINDRA Aguilar

## 2023-12-15 ENCOUNTER — OFFICE VISIT (OUTPATIENT)
Dept: PHYSICAL THERAPY | Facility: HOSPITAL | Age: 38
End: 2023-12-15
Attending: PHYSICAL MEDICINE & REHABILITATION
Payer: COMMERCIAL

## 2023-12-15 PROCEDURE — 97140 MANUAL THERAPY 1/> REGIONS: CPT | Performed by: PHYSICAL THERAPIST

## 2023-12-15 PROCEDURE — 97110 THERAPEUTIC EXERCISES: CPT | Performed by: PHYSICAL THERAPIST

## 2023-12-15 PROCEDURE — 97112 NEUROMUSCULAR REEDUCATION: CPT | Performed by: PHYSICAL THERAPIST

## 2023-12-15 NOTE — PROGRESS NOTES
12/15/2023  Dx:   Diagnosis:   Lumbar disc disease (M51.9)  Lumbar foraminal stenosis (M48.061)  Attention deficit hyperactivity disorder (ADHD), predominantly inattentive type (F90.0)  Asherman's syndrome (N85.6)  Adolescent idiopathic scoliosis of thoracolumbar region (Y22.811)  Lumbar herniated disc (M51.26)                  Authorized # of Visits:  18 visits on POC          Next MD visit: none   Fall Risk: standard         Precautions:  18 visits on POC  Medication Changes since last visit?: No    Subjective: Reports she is feeling better. States she is no longer coughing. Reports the Justin exercises the way she did them in the clinic really seems to help. States she did a [place] class with a lot of lateral movement but did her HEP afterwards and didn't have any pain afterwards,    Pain Ratin/10 VAS     Objective:      2023  Visit #   2   Manual Therapy Cental PA's L4L5 and L5S1   Therapeutic Exercise Educated in centralization of symptoms and precautions for therapy    PPU's    PPU's with sag   Therapeutic Activity    Neuromuscular Education Paraspinal release with L hamstring    90/90 repositioning with L hip retraction and R hip flexion   TNE Education    HEP 90/90 repositioning with L hip retraction and R hip flexion    PPU's with sag         Assessment: No adverse effects to treatment. Significantly less pain this date and improved exercise tolerance. The pt displayed a (+) ADT/PADT/PART and was able to reposition with 90/90 positioning. The pt displayed a (-) ADT L after the session ad was given an updated HEP. Goals: The pt was educated on the plan of care, purpose and individual goals for therapy, precautions for therapy. All questions were answered. 1.  The pt will be independent in their HEP. 2.  Centralization of symptoms to the lumbar spine.   3.  The pt will be independent in a modified workout program.  4.  The pt will demonstrate appropriate body mechanics with lifting an object from the ground. 5.  The pt will be able to reports a 50% decrease in symptoms. 6.  The pt will not have a recurrence of symptoms in 3 months. .    Frequency / Duration: Patient will be seen for 1-2 x/week or a total of 18 visits over a 90 day period. Treatment will include: Manual Therapy; Therapeutic Exercises; Neuromuscular Re-education; Therapeutic Activity; Patient education: Home exercise program instruction; TNE Education; Modalities as needed. Education or treatment limitation: None  Rehab Potential good      Certification From: 44/71/9652  To:2/26/2024      Charges: [place] (10), TE1 (13) , NM1 (15)      Total Timed Treatment: 38 min  Total Treatment Time: 38 min        FOR REFERENCE ONLY   LUMBAR SPINE EVALUATION:   Referring Physician: Dr. Kenji Chiu  Diagnosis:   Lumbar disc disease (M51.9)  Lumbar foraminal stenosis (M48.061)  Attention deficit hyperactivity disorder (ADHD), predominantly inattentive type (F90.0)  Asherman's syndrome (N85.6)  Adolescent idiopathic scoliosis of thoracolumbar region (P29.944)  Lumbar herniated disc (M51.26)     Date of Service: 11/28/2023   Date of Onset: several years ago    PATIENT SUMMARY   Magno Enriquez is a 43year old y/o female who presents to therapy today with complaints of  L side LBP. Has had some intermittent sciatic pain into the R buttock and top of the hamstring. Denies falls/bladder/bowels issues. Just got a Medrol Dose Pack and Predisone/Zpack last week which helped her pain. History of current condition: long hx of LBP, first episode at 21years old - would have a bad flare up that would keep her in bed for a week. Had seen 3 surgeons about 10 years ago but avoided surgery. States her back will go out off and on. Started [employer], [place], [employer] . Daun Comber And went to to a chiropractor and did better. Last Feb had a bad flare up - took a Medrol Dose Pack. States her L side never full recovered.   States she always has pain in her L side. This summer she would end up in full spasm after a busy fay. Lies on a heating pad which helps. States she is better in the morning. Had a cold Sept/Nov and she got worse from coughing. No injections recently. 4 y/o. 4 y/o and 5 y/o kids    Worked with [employer] - helped slightly. Was challenging to attend appointments. Pain Ratin-2/10 VAS,     Current functional limitations include limited ability to bend/lift/twist, unable to participate in workouts, unable to perform yoga. Lorri describes prior level of function independent in all activities. Pt goals include decrease pain, improve her overall function. Past medical history was reviewed with Lorri. Significant findings include umbiliitcal hernia. Past Medical History:   Diagnosis Date    Chronic pain of both knees 2018    Formatting of this note might be different from the original. Last Assessment & Plan: She is training for marathon, notes pain in her knees that is interfering with her progress. No associated swelling or joint instability. Plan:  Referred for PT for further evaluation and management. Crushing injury of toe of right foot 2015    Esophageal reflux     Eustachian tube dysfunction, bilateral 2019    Lumbar disc herniation 02/15/2011    Lumbar radicular pain 02/15/2011    Tension-type headache, not intractable 2018    Formatting of this note might be different from the original. Last Assessment & Plan: Off and on headaches for the last 2-3 weeks, predominantly around her temples, without associated visual change, nausea, vomiting, or light sensitivity. Minimal caffeine intake. Some increased stress, lost a friend to cancer, going through process of possibly having third child through surrogate.   Training for      Past Surgical History:   Procedure Laterality Date          ENLARGE BREAST WITH IMPLANT      ENLARGE BREAST WITH IMPLANT         Are you being hurt, frightened, demeaned, or taken advantage of by anyone at your home or in your life? No     Have you recently had thoughts of hurting yourself? No    Have you tried to hurt yourself in the past?  No      ASSESSMENT:   Beltran Mahmood presents to therapy with a long hx of LBP that limits her ability to bend/lift/twist, participate in workouts/complete yoga classes. She presents with a complex pattern of postural compensations with a L AIC pattern so of postural compensation. She also displays systematic hypermobility, poor IO/TA recruitment with poor core strength and increased pain that limits her mobility. She will benefit from skilled PT to decrease her pain and return to her PLOF.     Precautions:  scoliosis, ,       OBJECTIVE:   Observation/Posture: scoliosis curvature, anterior pelvic tilt  Gait: bilateral heel toe gait pattern without gross deviation  ROM:     Trunk         Pain (+/-)   Flexion Hands to floor                       Extension Decreased 25%  In the lower lumbar spine   R Sidebend WFL    L Sidebend WFL    R Rotation NT    L Rotation NT    R Sideglide WFL    L Sideglide WFL      Repeated Motion Testing: REIL - decreased/better    SANDRA Testing R L   Apical Expansion WFL WFL   Adduction Drop Test - +   Extension Drop Test - -   SLR 90 90   Trunk Rotation T NT   Posterior Mediastinum Expansion Test WFL decreased   Standing Reach Test - -   Elevated and ER Anterior Rib NT NT   PART + -   PADT - +   Hrusta Adduction Lift Test 4 4   Hrusta Abduction Lift Test NT NT       STRENGTH:   5/5 MMT Scale   Left  Right Comments   Hip Flexion (L2) 5     5    Knee Extension (L3) 5 5    Knee Flexion 5 5    Ankle DF (L4) 5 5    EHL (L5) 5 4+    Ankle PF (S1) 5 5    Hip Abduction NT NT    Hip Extension NT NT      Flexibility:      R L   Hamstrings long long   Piriformis long long   Hip Flexor long long     Neuro Screen: (-) heel walking and toe walking bilaterally    Special Tests: 4/5 functional squat test    Outcome Surverys  Oswestry Disability Index Score  Score: 8 % (11/28/2023  9:20 AM)

## 2023-12-22 ENCOUNTER — OFFICE VISIT (OUTPATIENT)
Dept: PHYSICAL THERAPY | Facility: HOSPITAL | Age: 38
End: 2023-12-22
Attending: PHYSICAL MEDICINE & REHABILITATION
Payer: COMMERCIAL

## 2023-12-22 PROCEDURE — 97140 MANUAL THERAPY 1/> REGIONS: CPT | Performed by: PHYSICAL THERAPIST

## 2023-12-22 PROCEDURE — 97110 THERAPEUTIC EXERCISES: CPT | Performed by: PHYSICAL THERAPIST

## 2023-12-22 PROCEDURE — 97530 THERAPEUTIC ACTIVITIES: CPT | Performed by: PHYSICAL THERAPIST

## 2023-12-22 NOTE — PROGRESS NOTES
2023  Dx:   Diagnosis:   Lumbar disc disease (M51.9)  Lumbar foraminal stenosis (M48.061)  Attention deficit hyperactivity disorder (ADHD), predominantly inattentive type (F90.0)  Asherman's syndrome (N85.6)  Adolescent idiopathic scoliosis of thoracolumbar region (Z97.134)  Lumbar herniated disc (M51.26)                  Authorized # of Visits:  18 visits on POC          Next MD visit: none   Fall Risk: standard         Precautions:  18 visits on POC  Medication Changes since last visit?: No    Subjective: Reports she feeling pretty good. States she did two [place] workouts this week and was able to go back further with her PPUs. Hasn't been able to do her other exercises yet. Pain Ratin/10 VAS     Objective:      2023  Visit #   2 2023  Visit #3   Manual Therapy Cental PA's L4L5 and L5S1 Central PA L4L5 and L5S1    L on L sacral rotation MET    L prone leg distraction    L anterior to posterior ilium mob   Therapeutic Exercise Educated in centralization of symptoms and precautions for therapy    PPU's    PPU's with sag PPU's with sag    Lat hang for spinal elongation   Therapeutic Activity  Education on core based exercies for the gym avoiding excessive strain in the lumbar spine and keeping her back in a neutral position   Neuromuscular Education Paraspinal release with L hamstring    90/90 repositioning with L hip retraction and R hip flexion    TNE Education     HEP 90/90 repositioning with L hip retraction and R hip flexion    PPU's with sag PPU's with sag    Lat hang for spinal elongation         Assessment: No adverse effects to treatment. Added manual therapy to address her pain at the L PSIS. Note (+) March test L. Also added lat hang for HEP and educated the patient on appropriate gym based activities to avoid excessive stress on the lumbar spine. Goals: The pt was educated on the plan of care, purpose and individual goals for therapy, precautions for therapy.   All questions were answered. 1.  The pt will be independent in their HEP. 2.  Centralization of symptoms to the lumbar spine. 3.  The pt will be independent in a modified workout program.  4.  The pt will demonstrate appropriate body mechanics with lifting an object from the ground. 5.  The pt will be able to reports a 50% decrease in symptoms. 6.  The pt will not have a recurrence of symptoms in 3 months. .    Frequency / Duration: Patient will be seen for 1-2 x/week or a total of 18 visits over a 90 day period. Treatment will include: Manual Therapy; Therapeutic Exercises; Neuromuscular Re-education; Therapeutic Activity; Patient education: Home exercise program instruction; TNE Education; Modalities as needed. Education or treatment limitation: None  Rehab Potential good      Certification From: 78/54/5011  To:2/26/2024      Charges: Amy Holloway (23), TE(10), TA1(13)    Total Timed Treatment: 46 min  Total Treatment Time: 46 min        FOR REFERENCE ONLY   LUMBAR SPINE EVALUATION:   Referring Physician: Dr. Gerber Costello  Diagnosis:   Lumbar disc disease (M51.9)  Lumbar foraminal stenosis (M48.061)  Attention deficit hyperactivity disorder (ADHD), predominantly inattentive type (F90.0)  Asherman's syndrome (N85.6)  Adolescent idiopathic scoliosis of thoracolumbar region (T08.794)  Lumbar herniated disc (M51.26)     Date of Service: 11/28/2023   Date of Onset: several years ago    PATIENT SUMMARY   Susie Hassan is a 43year old y/o female who presents to therapy today with complaints of  L side LBP. Has had some intermittent sciatic pain into the R buttock and top of the hamstring. Denies falls/bladder/bowels issues. Just got a Medrol Dose Pack and Predisone/Zpack last week which helped her pain. History of current condition: long hx of LBP, first episode at 21years old - would have a bad flare up that would keep her in bed for a week. Had seen 3 surgeons about 10 years ago but avoided surgery.   States her back will go out off and on. Started [employer], [place], Daily . [employer] [employer] And went to to a chiropractor and did better. Last Feb had a bad flare up - took a Medrol Dose Pack. States her L side never full recovered. States she always has pain in her L side. This summer she would end up in full spasm after a busy fay. Lies on a heating pad which helps. States she is better in the morning. Had a cold Sept/Nov and she got worse from coughing. No injections recently. 5 y/o. 6 y/o and 5 y/o kids    Worked with [employer] - helped slightly. Was challenging to attend appointments. Pain Ratin-2/10 VAS,     Current functional limitations include limited ability to bend/lift/twist, unable to participate in workouts, unable to perform yoga. Lorri describes prior level of function independent in all activities. Pt goals include decrease pain, improve her overall function. Past medical history was reviewed with Lorri. Significant findings include umbiliitcal hernia. Past Medical History:   Diagnosis Date    Chronic pain of both knees 2018    Formatting of this note might be different from the original. Last Assessment & Plan: She is training for marathon, notes pain in her knees that is interfering with her progress. No associated swelling or joint instability. Plan:  Referred for PT for further evaluation and management. Crushing injury of toe of right foot 2015    Esophageal reflux     Eustachian tube dysfunction, bilateral 2019    Lumbar disc herniation 02/15/2011    Lumbar radicular pain 02/15/2011    Tension-type headache, not intractable 2018    Formatting of this note might be different from the original. Last Assessment & Plan: Off and on headaches for the last 2-3 weeks, predominantly around her temples, without associated visual change, nausea, vomiting, or light sensitivity. Minimal caffeine intake.   Some increased stress, lost a friend to cancer, going through process of possibly having third child through surrogate. Training for      Past Surgical History:   Procedure Laterality Date          ENLARGE BREAST WITH IMPLANT      ENLARGE BREAST WITH IMPLANT         Are you being hurt, frightened, demeaned, or taken advantage of by anyone at your home or in your life? No     Have you recently had thoughts of hurting yourself? No    Have you tried to hurt yourself in the past?  No      ASSESSMENT:   Marianela Serrato presents to therapy with a long hx of LBP that limits her ability to bend/lift/twist, participate in workouts/complete yoga classes. She presents with a complex pattern of postural compensations with a L AIC pattern so of postural compensation. She also displays systematic hypermobility, poor IO/TA recruitment with poor core strength and increased pain that limits her mobility. She will benefit from skilled PT to decrease her pain and return to her PLOF.     Precautions:  scoliosis, ,       OBJECTIVE:   Observation/Posture: scoliosis curvature, anterior pelvic tilt  Gait: bilateral heel toe gait pattern without gross deviation  ROM:     Trunk         Pain (+/-)   Flexion Hands to floor                       Extension Decreased 25%  In the lower lumbar spine   R Sidebend WFL    L Sidebend WFL    R Rotation NT    L Rotation NT    R Sideglide WFL    L Sideglide WFL      Repeated Motion Testing: REIL - decreased/better    SANDRA Testing R L   Apical Expansion [facility] WFL   Adduction Drop Test - +   Extension Drop Test - -   SLR 90 90   Trunk Rotation T NT   Posterior Mediastinum Expansion Test WFL decreased   Standing Reach Test - -   Elevated and ER Anterior Rib NT NT   PART + -   PADT - +   Hrusta Adduction Lift Test 4 4   Hrusta Abduction Lift Test NT NT       STRENGTH:   5/5 MMT Scale   Left  Right Comments   Hip Flexion (L2) 5     5    Knee Extension (L3) 5 5    Knee Flexion 5 5    Ankle DF (L4) 5 5    EHL (L5) 5 4+    Ankle PF (S1) 5 5    Hip Abduction NT NT    Hip Extension NT NT      Flexibility:      R L   Hamstrings long long   Piriformis long long   Hip Flexor long long     Neuro Screen: (-) heel walking and toe walking bilaterally    Special Tests: 4/5 functional squat test    Outcome Surverys  Oswestry Disability Index Score  Score: 8 % (11/28/2023  9:20 AM)

## 2024-01-05 ENCOUNTER — OFFICE VISIT (OUTPATIENT)
Dept: PHYSICAL THERAPY | Facility: HOSPITAL | Age: 39
End: 2024-01-05
Attending: PHYSICAL MEDICINE & REHABILITATION
Payer: COMMERCIAL

## 2024-01-05 PROCEDURE — 97140 MANUAL THERAPY 1/> REGIONS: CPT | Performed by: PHYSICAL THERAPIST

## 2024-01-05 NOTE — PROGRESS NOTES
2024    Dx:   Diagnosis:   Lumbar disc disease (M51.9)  Lumbar foraminal stenosis (M48.061)  Attention deficit hyperactivity disorder (ADHD), predominantly inattentive type (F90.0)  Asherman's syndrome (N85.6)  Adolescent idiopathic scoliosis of thoracolumbar region (M41.125)  Lumbar herniated disc (M51.26)                  Authorized # of Visits:  18 visits on POC          Next MD visit: none   Fall Risk: standard         Precautions:  18 visits on POC  Medication Changes since last visit?: No    Subjective: Reports she did a yoga class and felt that her back worse after that.  States she drove for 7.5 hours and did a lot of bending in class.  States she did her HEP which did help.     Pain Ratin/10 VAS     Objective:      2023  Visit #   2 2023  Visit #3 2024  Visit #4   Manual Therapy Cental PA's L4L5 and L5S1 Central PA L4L5 and L5S1    L on L sacral rotation MET    L prone leg distraction    L anterior to posterior ilium mob Central PA L4L5 and L5S1    STM/MFR lumbar paraspinals    Bilateral iliopsoas relese   Therapeutic Exercise Educated in centralization of symptoms and precautions for therapy    PPU's    PPU's with sag PPU's with sag    Lat hang for spinal elongation    Therapeutic Activity  Education on core based exercies for the gym avoiding excessive strain in the lumbar spine and keeping her back in a neutral position    Neuromuscular Education Paraspinal release with L hamstring    90/90 repositioning with L hip retraction and R hip flexion     TNE Education      HEP 90/90 repositioning with L hip retraction and R hip flexion    PPU's with sag PPU's with sag    Lat hang for spinal elongation          Assessment: No adverse effects to treatment. Note increased pain this date with myospasm in the lumbar paraspinals.  The pt noted relief from the session.  Advised to continue her current HEP.    Goals:      The pt was educated on the plan of care, purpose and individual goals for  therapy, precautions for therapy.  All questions were answered.     1.  The pt will be independent in their HEP.  2.  Centralization of symptoms to the lumbar spine.  3.  The pt will be independent in a modified workout program.  4.  The pt will demonstrate appropriate body mechanics with lifting an object from the ground.  5.  The pt will be able to reports a 50% decrease in symptoms.  6.  The pt will not have a recurrence of symptoms in 3 months..    Frequency / Duration: Patient will be seen for 1-2 x/week or a total of 18 visits over a 90 day period.  Treatment will include: Manual Therapy; Therapeutic Exercises; Neuromuscular Re-education; Therapeutic Activity;  Patient education: Home exercise program instruction; TNE Education; Modalities as needed.    Education or treatment limitation: None  Rehab Potential good      Certification From: 11/28/2023  To:2/26/2024      Charges: Ma3 (38)   Total Timed Treatment: 38 min  Total Treatment Time: 38 min        FOR REFERENCE ONLY   LUMBAR SPINE EVALUATION:   Referring Physician: Dr. Aguilar  Diagnosis:   Lumbar disc disease (M51.9)  Lumbar foraminal stenosis (M48.061)  Attention deficit hyperactivity disorder (ADHD), predominantly inattentive type (F90.0)  Asherman's syndrome (N85.6)  Adolescent idiopathic scoliosis of thoracolumbar region (M41.125)  Lumbar herniated disc (M51.26)     Date of Service: 11/28/2023   Date of Onset: several years ago    PATIENT SUMMARY   Lorri Gunderson is a 38 year old y/o female who presents to therapy today with complaints of  L side LBP.  Has had some intermittent sciatic pain into the R buttock and top of the hamstring.  Denies falls/bladder/bowels issues.  Just got a Medrol Dose Pack and Predisone/Zpack last week which helped her pain.       History of current condition: long hx of LBP, first episode at 24 years old - would have a bad flare up that would keep her in bed for a week.  Had seen 3 surgeons about 10 years ago but avoided  surgery.  States her back will go out off and on.      Started [employer], [place], Daily .. And went to to a chiropractor and did better.      Last Feb had a bad flare up - took a Medrol Dose Pack.  States her L side never full recovered.  States she always has pain in her L side.  This summer she would end up in full spasm after a busy fay.  Lies on a heating pad which helps.  States she is better in the morning.        Had a cold Sept/Nov and she got worse from coughing.      No injections recently.      11 y/o. 10 y/o and 5 y/o kids    Worked with [employer] - helped slightly.  Was challenging to attend appointments.      Pain Ratin-2/10 VAS,     Current functional limitations include limited ability to bend/lift/twist, unable to participate in workouts, unable to perform yoga.   Lorri describes prior level of function independent in all activities. Pt goals include decrease pain, improve her overall function.    Past medical history was reviewed with Lorri. Significant findings include umbiliitcal hernia.     Past Medical History:   Diagnosis Date    Chronic pain of both knees 2018    Formatting of this note might be different from the original. Last Assessment & Plan: She is training for marathon, notes pain in her knees that is interfering with her progress. No associated swelling or joint instability.  Plan:  Referred for PT for further evaluation and management.    Crushing injury of toe of right foot 2015    Esophageal reflux     Eustachian tube dysfunction, bilateral 2019    Lumbar disc herniation 02/15/2011    Lumbar radicular pain 02/15/2011    Tension-type headache, not intractable 2018    Formatting of this note might be different from the original. Last Assessment & Plan: Off and on headaches for the last 2-3 weeks, predominantly around her temples, without associated visual change, nausea, vomiting, or light sensitivity.  Minimal caffeine intake.  Some increased stress, lost a  friend to cancer, going through process of possibly having third child through surrogate.  Training for      Past Surgical History:   Procedure Laterality Date          ENLARGE BREAST WITH IMPLANT      ENLARGE BREAST WITH IMPLANT         Are you being hurt, frightened, demeaned, or taken advantage of by anyone at your home or in your life?  No     Have you recently had thoughts of hurting yourself?  No    Have you tried to hurt yourself in the past?  No      ASSESSMENT:   Lorri Gunderson presents to therapy with a long hx of LBP that limits her ability to bend/lift/twist, participate in workouts/complete yoga classes.  She presents with a complex pattern of postural compensations with a L AIC pattern so of postural compensation.  She also displays systematic hypermobility, poor IO/TA recruitment with poor core strength and increased pain that limits her mobility.  She will benefit from skilled PT to decrease her pain and return to her PLOF.    Precautions:  scoliosis, ,       OBJECTIVE:   Observation/Posture: scoliosis curvature, anterior pelvic tilt  Gait: bilateral heel toe gait pattern without gross deviation  ROM:     Trunk         Pain (+/-)   Flexion Hands to floor                       Extension Decreased 25%  In the lower lumbar spine   R Sidebend WFL    L Sidebend WFL    R Rotation NT    L Rotation NT    R Sideglide WFL    L Sideglide WFL      Repeated Motion Testing: REIL - decreased/better    SANDRA Testing R L   Apical Expansion WFL WFL   Adduction Drop Test - +   Extension Drop Test - -   SLR 90 90   Trunk Rotation T NT   Posterior Mediastinum Expansion Test WFL decreased   Standing Reach Test - -   Elevated and ER Anterior Rib NT NT   PART + -   PADT - +   Hrusta Adduction Lift Test 4 4   [place]rust[place] Abduction Lift Test NT NT       STRENGTH:   5/5 MMT Scale   Left  Right Comments   Hip Flexion (L2) 5     5    Knee Extension (L3) 5 5    Knee Flexion 5 5    Ankle DF (L4) 5 5    EHL  (L5) 5 4+    Ankle PF (S1) 5 5    Hip Abduction NT NT    Hip Extension NT NT      Flexibility:      R L   Hamstrings long long   Piriformis long long   Hip Flexor long long     Neuro Screen: (-) heel walking and toe walking bilaterally    Special Tests: 4/5 functional squat test    Outcome Surverys  Oswestry Disability Index Score  Score: 8 % (11/28/2023  9:20 AM)

## 2024-01-08 ENCOUNTER — OFFICE VISIT (OUTPATIENT)
Dept: PHYSICAL THERAPY | Facility: HOSPITAL | Age: 39
End: 2024-01-08
Attending: PHYSICAL MEDICINE & REHABILITATION
Payer: COMMERCIAL

## 2024-01-08 PROCEDURE — 97530 THERAPEUTIC ACTIVITIES: CPT | Performed by: PHYSICAL THERAPIST

## 2024-01-08 PROCEDURE — 97112 NEUROMUSCULAR REEDUCATION: CPT | Performed by: PHYSICAL THERAPIST

## 2024-01-08 NOTE — PROGRESS NOTES
2024  Dx:   Diagnosis:   Lumbar disc disease (M51.9)  Lumbar foraminal stenosis (M48.061)  Attention deficit hyperactivity disorder (ADHD), predominantly inattentive type (F90.0)  Asherman's syndrome (N85.6)  Adolescent idiopathic scoliosis of thoracolumbar region (M41.125)  Lumbar herniated disc (M51.26)                  Authorized # of Visits:  18 visits on POC          Next MD visit: none   Fall Risk: standard         Precautions:  18 visits on POC  Medication Changes since last visit?: No    Subjective: Reports she felt better after the last treatment.  States she did a video at home on .  Would like to run a  marathon.    Pain Ratin/10 VAS     Objective:      2023  Visit #   2 2023  Visit #3 2024  Visit #4 2024  Visit #5   Manual Therapy Cental PA's L4L5 and L5S1 Central PA L4L5 and L5S1    L on L sacral rotation MET    L prone leg distraction    L anterior to posterior ilium mob Central PA L4L5 and L5S1    STM/MFR lumbar paraspinals    Bilateral iliopsoas relese    Therapeutic Exercise Educated in centralization of symptoms and precautions for therapy    PPU's    PPU's with sag PPU's with sag    Lat hang for spinal elongation  Education on gym based modifications   Twists in sitting  2.  Unilateral rows in sitting  3.  High planks  4.  Side planks    Education on spinal anatomy and needs to keep her pelvis in neutral during activities         Therapeutic Activity  Education on core based exercies for the gym avoiding excessive strain in the lumbar spine and keeping her back in a neutral position     Neuromuscular Education Paraspinal release with L hamstring    90/90 repositioning with L hip retraction and R hip flexion   Supine 90/90 repositioning with L hip retraction   TNE Education       HEP 90/90 repositioning with L hip retraction and R hip flexion    PPU's with sag PPU's with sag    Lat hang for spinal elongation           Assessment: No adverse effects to treatment.  Less pain this date. Extensively educated on keeping her pelvis in neutral to avoid strain on the lumbar spine.  Modified her gym based program.  Needs further serratus strengthening.  Continues to have (+) ADT bilaterally.  Will progress SANDRA activities next visit.    Goals:      The pt was educated on the plan of care, purpose and individual goals for therapy, precautions for therapy.  All questions were answered.     1.  The pt will be independent in their HEP.  2.  Centralization of symptoms to the lumbar spine.  3.  The pt will be independent in a modified workout program.  4.  The pt will demonstrate appropriate body mechanics with lifting an object from the ground.  5.  The pt will be able to reports a 50% decrease in symptoms.  6.  The pt will not have a recurrence of symptoms in 3 months..    Frequency / Duration: Patient will be seen for 1-2 x/week or a total of 18 visits over a 90 day period.  Treatment will include: Manual Therapy; Therapeutic Exercises; Neuromuscular Re-education; Therapeutic Activity;  Patient education: Home exercise program instruction; TNE Education; Modalities as needed.    Education or treatment limitation: None  Rehab Potential good      Certification From: 11/28/2023  To:2/26/2024      Charges: TA3 (38), NM1 (8)   Total Timed Treatment: 46 min  Total Treatment Time: 46 min        FOR REFERENCE ONLY   LUMBAR SPINE EVALUATION:   Referring Physician: Dr. Aguilar  Diagnosis:   Lumbar disc disease (M51.9)  Lumbar foraminal stenosis (M48.061)  Attention deficit hyperactivity disorder (ADHD), predominantly inattentive type (F90.0)  Asherman's syndrome (N85.6)  Adolescent idiopathic scoliosis of thoracolumbar region (M41.125)  Lumbar herniated disc (M51.26)     Date of Service: 11/28/2023   Date of Onset: several years ago    PATIENT SUMMARY   Lorri Gunderson is a 38 year old y/o female who presents to therapy today with complaints of  L side LBP.  Has had some intermittent sciatic pain  into the R buttock and top of the hamstring.  Denies falls/bladder/bowels issues.  Just got a Medrol Dose Pack and Predisone/Zpack last week which helped her pain.       History of current condition: long hx of LBP, first episode at 24 years old - would have a bad flare up that would keep her in bed for a week.  Had seen 3 surgeons about 10 years ago but avoided surgery.  States her back will go out off and on.      Started Pilate's, [place], Daily .. And went to to a chiropractor and did better.      Last Feb had a bad flare up - took a Medrol Dose Pack.  States her L side never full recovered.  States she always has pain in her L side.  This summer she would end up in full spasm after a busy fay.  Lies on a heating pad which helps.  States she is better in the morning.        Had a cold Sept/Nov and she got worse from coughing.      No injections recently.      9 y/o. 10 y/o and 5 y/o kids    Worked with [employer] - helped slightly.  Was challenging to attend appointments.      Pain Ratin-2/10 VAS,     Current functional limitations include limited ability to bend/lift/twist, unable to participate in workouts, unable to perform yoga.   Lorri describes prior level of function independent in all activities. Pt goals include decrease pain, improve her overall function.    Past medical history was reviewed with Lorri. Significant findings include umbiliitcal hernia.     Past Medical History:   Diagnosis Date    Chronic pain of both knees 2018    Formatting of this note might be different from the original. Last Assessment & Plan: She is training for marathon, notes pain in her knees that is interfering with her progress. No associated swelling or joint instability.  Plan:  Referred for PT for further evaluation and management.    Crushing injury of toe of right foot 2015    Esophageal reflux     Eustachian tube dysfunction, bilateral 2019    Lumbar disc herniation 02/15/2011    Lumbar radicular  pain 02/15/2011    Tension-type headache, not intractable 2018    Formatting of this note might be different from the original. Last Assessment & Plan: Off and on headaches for the last 2-3 weeks, predominantly around her temples, without associated visual change, nausea, vomiting, or light sensitivity.  Minimal caffeine intake.  Some increased stress, lost a friend to cancer, going through process of possibly having third child through surrogate.  Training for      Past Surgical History:   Procedure Laterality Date          ENLARGE BREAST WITH IMPLANT      ENLARGE BREAST WITH IMPLANT         Are you being hurt, frightened, demeaned, or taken advantage of by anyone at your home or in your life?  No     Have you recently had thoughts of hurting yourself?  No    Have you tried to hurt yourself in the past?  No      ASSESSMENT:   Lorri Gunderson presents to therapy with a long hx of LBP that limits her ability to bend/lift/twist, participate in workouts/complete yoga classes.  She presents with a complex pattern of postural compensations with a L AIC pattern so of postural compensation.  She also displays systematic hypermobility, poor IO/TA recruitment with poor core strength and increased pain that limits her mobility.  She will benefit from skilled PT to decrease her pain and return to her PLOF.    Precautions:  scoliosis, ,       OBJECTIVE:   Observation/Posture: scoliosis curvature, anterior pelvic tilt  Gait: bilateral heel toe gait pattern without gross deviation  ROM:     Trunk         Pain (+/-)   Flexion Hands to floor                       Extension Decreased 25%  In the lower lumbar spine   R Sidebend WFL    L Sidebend WFL    R Rotation NT    L Rotation NT    R Sideglide WFL    L Sideglide WFL      Repeated Motion Testing: REIL - decreased/better    SANDRA Testing R L   Apical Expansion WFL WFL   Adduction Drop Test - +   Extension Drop Test - -   SLR 90 90   Trunk Rotation T  NT   Posterior Mediastinum Expansion Test WFL decreased   Standing Reach Test - -   Elevated and ER Anterior Rib NT NT   PART + -   PADT - +   Hrusta Adduction Lift Test 4 4   Hrusta Abduction Lift Test NT NT       STRENGTH:   5/5 MMT Scale   Left  Right Comments   Hip Flexion (L2) 5     5    Knee Extension (L3) 5 5    Knee Flexion 5 5    Ankle DF (L4) 5 5    EHL (L5) 5 4+    Ankle PF (S1) 5 5    Hip Abduction NT NT    Hip Extension NT NT      Flexibility:      R L   Hamstrings long long   Piriformis long long   Hip Flexor long long     Neuro Screen: (-) heel walking and toe walking bilaterally    Special Tests: 4/5 functional squat test    Outcome Surverys  Oswestry Disability Index Score  Score: 8 % (11/28/2023  9:20 AM)

## 2024-01-15 ENCOUNTER — TELEPHONE (OUTPATIENT)
Dept: PHYSICAL THERAPY | Facility: HOSPITAL | Age: 39
End: 2024-01-15

## 2024-01-15 ENCOUNTER — OFFICE VISIT (OUTPATIENT)
Dept: PHYSICAL THERAPY | Facility: HOSPITAL | Age: 39
End: 2024-01-15
Attending: PHYSICAL MEDICINE & REHABILITATION
Payer: COMMERCIAL

## 2024-01-15 PROCEDURE — 97140 MANUAL THERAPY 1/> REGIONS: CPT | Performed by: PHYSICAL THERAPIST

## 2024-01-15 PROCEDURE — 97112 NEUROMUSCULAR REEDUCATION: CPT | Performed by: PHYSICAL THERAPIST

## 2024-01-15 NOTE — PROGRESS NOTES
1/15/2024  Dx:   Diagnosis:   Lumbar disc disease (M51.9)  Lumbar foraminal stenosis (M48.061)  Attention deficit hyperactivity disorder (ADHD), predominantly inattentive type (F90.0)  Asherman's syndrome (N85.6)  Adolescent idiopathic scoliosis of thoracolumbar region (M41.125)  Lumbar herniated disc (M51.26)                  Authorized # of Visits:  18 visits on POC          Next MD visit: none   Fall Risk: standard         Precautions:  18 visits on POC  Medication Changes since last visit?: No    Subjective: Reports she is feeling better.  States she did a video class ([employer] [place] [employer])  and focused on not rotating while exercising.  Reports she hasn't has as much pain.      Pain Ratin-3/10 VAS     Objective:      2023  Visit #   2 2023  Visit #3 2024  Visit #4 2024  Visit #5 1/15/2024  Visit #6   Manual Therapy Cental PA's L4L5 and L5S1 Central PA L4L5 and L5S1    L on L sacral rotation MET    L prone leg distraction    L anterior to posterior ilium mob Central PA L4L5 and L5S1    STM/MFR lumbar paraspinals    Bilateral iliopsoas relese  Central PA's L4L5 and L5S2   Therapeutic Exercise Educated in centralization of symptoms and precautions for therapy    PPU's    PPU's with sag PPU's with sag    Lat hang for spinal elongation  Education on gym based modifications   Twists in sitting  2.  Unilateral rows in sitting  3.  High planks  4.  Side planks    Education on spinal anatomy and needs to keep her pelvis in neutral during activities          Therapeutic Activity  Education on core based exercies for the gym avoiding excessive strain in the lumbar spine and keeping her back in a neutral position      Neuromuscular Education Paraspinal release with L hamstring    90/90 repositioning with L hip retraction and R hip flexion   Supine 90/90 repositioning with L hip retraction Paraspinal release with L hamstring    R SL, adductor pull backs    L stance in stance   TNE Education        HEP  90/90 repositioning with L hip retraction and R hip flexion    PPU's with sag PPU's with sag    Lat hang for spinal elongation   Paraspinal release with L hamstring    R SL, adductor pull backs    L stance in stance         Assessment: No adverse effects to treatment. Less pain this date. The pt continues to lack neutrality and therefore continued to trial different SANDRA techniques as listed above.  The pt was given an updated HEP.    Goals:      The pt was educated on the plan of care, purpose and individual goals for therapy, precautions for therapy.  All questions were answered.     1.  The pt will be independent in their HEP.  2.  Centralization of symptoms to the lumbar spine.  3.  The pt will be independent in a modified workout program.  4.  The pt will demonstrate appropriate body mechanics with lifting an object from the ground.  5.  The pt will be able to reports a 50% decrease in symptoms.  6.  The pt will not have a recurrence of symptoms in 3 months..    Frequency / Duration: Patient will be seen for 1-2 x/week or a total of 18 visits over a 90 day period.  Treatment will include: Manual Therapy; Therapeutic Exercises; Neuromuscular Re-education; Therapeutic Activity;  Patient education: Home exercise program instruction; TNE Education; Modalities as needed.    Education or treatment limitation: None  Rehab Potential good      Certification From: 11/28/2023  To:2/26/2024      Charges: Man1 (15), NM2 (23)   Total Timed Treatment: 38 min  Total Treatment Time: 38 min        FOR REFERENCE ONLY   LUMBAR SPINE EVALUATION:   Referring Physician: Dr. Aguilar  Diagnosis:   Lumbar disc disease (M51.9)  Lumbar foraminal stenosis (M48.061)  Attention deficit hyperactivity disorder (ADHD), predominantly inattentive type (F90.0)  Asherman's syndrome (N85.6)  Adolescent idiopathic scoliosis of thoracolumbar region (M41.125)  Lumbar herniated disc (M51.26)     Date of Service: 11/28/2023   Date of Onset: several years  ago    PATIENT SUMMARY   Lorri Gunderson is a 38 year old y/o female who presents to therapy today with complaints of  L side LBP.  Has had some intermittent sciatic pain into the R buttock and top of the hamstring.  Denies falls/bladder/bowels issues.  Just got a Medrol Dose Pack and Predisone/Zpack last week which helped her pain.       History of current condition: long hx of LBP, first episode at 24 years old - would have a bad flare up that would keep her in bed for a week.  Had seen 3 surgeons about 10 years ago but avoided surgery.  States her back will go out off and on.      Started Pilate's, [place], Daily .. And went to to a chiropractor and did better.      Last Feb had a bad flare up - took a Medrol Dose Pack.  States her L side never full recovered.  States she always has pain in her L side.  This summer she would end up in full spasm after a busy fay.  Lies on a heating pad which helps.  States she is better in the morning.        Had a cold Sept/Nov and she got worse from coughing.      No injections recently.      9 y/o. 8 y/o and 3 y/o kids    Worked with [employer] - helped slightly.  Was challenging to attend appointments.      Pain Ratin-2/10 VAS,     Current functional limitations include limited ability to bend/lift/twist, unable to participate in workouts, unable to perform yoga.   Lorri describes prior level of function independent in all activities. Pt goals include decrease pain, improve her overall function.    Past medical history was reviewed with Lorri. Significant findings include umbiliitcal hernia.     Past Medical History:   Diagnosis Date    Chronic pain of both knees 2018    Formatting of this note might be different from the original. Last Assessment & Plan: She is training for marathon, notes pain in her knees that is interfering with her progress. No associated swelling or joint instability.  Plan:  Referred for PT for further evaluation and management.    Crushing  injury of toe of right foot 2015    Esophageal reflux     Eustachian tube dysfunction, bilateral 2019    Lumbar disc herniation 02/15/2011    Lumbar radicular pain 02/15/2011    Tension-type headache, not intractable 2018    Formatting of this note might be different from the original. Last Assessment & Plan: Off and on headaches for the last 2-3 weeks, predominantly around her temples, without associated visual change, nausea, vomiting, or light sensitivity.  Minimal caffeine intake.  Some increased stress, lost a friend to cancer, going through process of possibly having third child through surrogate.  Training for      Past Surgical History:   Procedure Laterality Date          ENLARGE BREAST WITH IMPLANT      ENLARGE BREAST WITH IMPLANT         Are you being hurt, frightened, demeaned, or taken advantage of by anyone at your home or in your life?  No     Have you recently had thoughts of hurting yourself?  No    Have you tried to hurt yourself in the past?  No      ASSESSMENT:   Lorri Gunderson presents to therapy with a long hx of LBP that limits her ability to bend/lift/twist, participate in workouts/complete yoga classes.  She presents with a complex pattern of postural compensations with a L AIC pattern so of postural compensation.  She also displays systematic hypermobility, poor IO/TA recruitment with poor core strength and increased pain that limits her mobility.  She will benefit from skilled PT to decrease her pain and return to her PLOF.    Precautions:  scoliosis, ,       OBJECTIVE:   Observation/Posture: scoliosis curvature, anterior pelvic tilt  Gait: bilateral heel toe gait pattern without gross deviation  ROM:     Trunk         Pain (+/-)   Flexion Hands to floor                       Extension Decreased 25%  In the lower lumbar spine   R Sidebend WFL    L Sidebend WFL    R Rotation NT    L Rotation NT    R Sideglide WFL    L Sideglide WFL       Repeated Motion Testing: REIL - decreased/better    SANDRA Testing R L   Apical Expansion WFL WFL   Adduction Drop Test - +   Extension Drop Test - -   SLR 90 90   Trunk Rotation T NT   Posterior Mediastinum Expansion Test WFL decreased   Standing Reach Test - -   Elevated and ER Anterior Rib NT NT   PART + -   PADT - +   Hrusta Adduction Lift Test 4 4   Hrusta Abduction Lift Test NT NT       STRENGTH:   5/5 MMT Scale   Left  Right Comments   Hip Flexion (L2) 5     5    Knee Extension (L3) 5 5    Knee Flexion 5 5    Ankle DF (L4) 5 5    EHL (L5) 5 4+    Ankle PF (S1) 5 5    Hip Abduction NT NT    Hip Extension NT NT      Flexibility:      R L   Hamstrings long long   Piriformis long long   Hip Flexor long long     Neuro Screen: (-) heel walking and toe walking bilaterally    Special Tests: 4/5 functional squat test    Outcome Surverys  Oswestry Disability Index Score  Score: 8 % (11/28/2023  9:20 AM)

## 2024-01-19 ENCOUNTER — APPOINTMENT (OUTPATIENT)
Dept: PHYSICAL THERAPY | Facility: HOSPITAL | Age: 39
End: 2024-01-19
Attending: PHYSICAL MEDICINE & REHABILITATION
Payer: COMMERCIAL

## 2024-01-19 ENCOUNTER — TELEPHONE (OUTPATIENT)
Dept: PHYSICAL THERAPY | Facility: HOSPITAL | Age: 39
End: 2024-01-19

## 2024-01-22 ENCOUNTER — OFFICE VISIT (OUTPATIENT)
Dept: PHYSICAL THERAPY | Facility: HOSPITAL | Age: 39
End: 2024-01-22
Attending: PHYSICAL MEDICINE & REHABILITATION
Payer: COMMERCIAL

## 2024-01-22 PROCEDURE — 97112 NEUROMUSCULAR REEDUCATION: CPT | Performed by: PHYSICAL THERAPIST

## 2024-01-22 PROCEDURE — 97530 THERAPEUTIC ACTIVITIES: CPT | Performed by: PHYSICAL THERAPIST

## 2024-01-22 NOTE — PROGRESS NOTES
2024  Dx:   Diagnosis:   Lumbar disc disease (M51.9)  Lumbar foraminal stenosis (M48.061)  Attention deficit hyperactivity disorder (ADHD), predominantly inattentive type (F90.0)  Asherman's syndrome (N85.6)  Adolescent idiopathic scoliosis of thoracolumbar region (M41.125)  Lumbar herniated disc (M51.26)                  Authorized # of Visits:  18 visits on POC          Next MD visit: none   Fall Risk: standard         Precautions:  18 visits on POC  Medication Changes since last visit?: No    Subjective: Reports she is feeling good.  Has been doing her HEP.  Also leaned over a chirp wheel for her upper back which helped her pain in that area.  States her LB is less sore after her workouts with the adjustments she has made to her workouts.    Pain Ratin-3/10 VAS     Objective:      2024  Visit #4 2024  Visit #5 1/15/2024  Visit #6 2024  Visit #7   Manual Therapy [facility] L4L5 and L5S1    STM/MFR lumbar paraspinals    Bilateral iliopsoas relese  Central PA's L4L5 and L5S1 Central PA\"s L4L5 and L5S1   Therapeutic Exercise  Education on gym based modifications   Twists in sitting  2.  Unilateral rows in sitting  3.  High planks  4.  Side planks    Education on spinal anatomy and needs to keep her pelvis in neutral during activities           Therapeutic Activity    Spinal elongation positions for home    Body mechanics for ADL's/household chores   Neuromuscular Education  Supine 90/90 repositioning with L hip retraction Paraspinal release with L hamstring    R SL, adductor pull backs    L stance in stance R SL, adductor pull backs    R SL, L glut med    L SL, R glut max    L SL, knee towards knee     L stance with once inch box   TNE Education       HEP   Paraspinal release with L hamstring    R SL, adductor pull backs    L stance in stance R SL, adductor pull backs    R SL, L glut med    L SL, R glut max    L SL, knee towards knee     L stance with once inch box         Assessment: No adverse  effects to treatment. Less pain this date. Continued frontal plane control to promote neutrality.  The pt displayed (-) ADT/PADT/PART bilaterally by the end of the session.  Updated HEP.  Will progress to more  R glut max activities next visit.    Goals:      The pt was educated on the plan of care, purpose and individual goals for therapy, precautions for therapy.  All questions were answered.     1.  The pt will be independent in their HEP.  2.  Centralization of symptoms to the lumbar spine.  3.  The pt will be independent in a modified workout program.  4.  The pt will demonstrate appropriate body mechanics with lifting an object from the ground.  5.  The pt will be able to reports a 50% decrease in symptoms.  6.  The pt will not have a recurrence of symptoms in 3 months..    Frequency / Duration: Patient will be seen for 1-2 x/week or a total of 18 visits over a 90 day period.  Treatment will include: Manual Therapy; Therapeutic Exercises; Neuromuscular Re-education; Therapeutic Activity;  Patient education: Home exercise program instruction; TNE Education; Modalities as needed.    Education or treatment limitation: None  Rehab Potential good      Certification From: 11/28/2023  To:2/26/2024      Charges: NM3, (55) TA1 (8)   Total Timed Treatment: 46 min  Total Treatment Time: 46 min        FOR REFERENCE ONLY   LUMBAR SPINE EVALUATION:   Referring Physician: Dr. Aguilar  Diagnosis:   Lumbar disc disease (M51.9)  Lumbar foraminal stenosis (M48.061)  Attention deficit hyperactivity disorder (ADHD), predominantly inattentive type (F90.0)  Asherman's syndrome (N85.6)  Adolescent idiopathic scoliosis of thoracolumbar region (M41.125)  Lumbar herniated disc (M51.26)     Date of Service: 11/28/2023   Date of Onset: several years ago    PATIENT SUMMARY   Lorri Gunderson is a 38 year old y/o female who presents to therapy today with complaints of  L side LBP.  Has had some intermittent sciatic pain into the R buttock  and top of the hamstring.  Denies falls/bladder/bowels issues.  Just got a Medrol Dose Pack and Predisone/Zpack last week which helped her pain.       History of current condition: long hx of LBP, first episode at 24 years old - would have a bad flare up that would keep her in bed for a week.  Had seen 3 surgeons about 10 years ago but avoided surgery.  States her back will go out off and on.      Started Pilate's, [place], [employer] .. And went to to a chiropractor and did better.      Last Feb had a bad flare up - took a Medrol Dose Pack.  States her L side never full recovered.  States she always has pain in her L side.  This summer she would end up in full spasm after a busy fay.  Lies on a heating pad which helps.  States she is better in the morning.        Had a cold Sept/Nov and she got worse from coughing.      No injections recently.      9 y/o. 10 y/o and 5 y/o kids    Worked with [employer] - helped slightly.  Was challenging to attend appointments.      Pain Ratin-2/10 VAS,     Current functional limitations include limited ability to bend/lift/twist, unable to participate in workouts, unable to perform yoga.   Lorri describes prior level of function independent in all activities. Pt goals include decrease pain, improve her overall function.    Past medical history was reviewed with Lorri. Significant findings include umbiliitcal hernia.     Past Medical History:   Diagnosis Date    Chronic pain of both knees 2018    Formatting of this note might be different from the original. Last Assessment & Plan: She is training for marathon, notes pain in her knees that is interfering with her progress. No associated swelling or joint instability.  Plan:  Referred for PT for further evaluation and management.    Crushing injury of toe of right foot 2015    Esophageal reflux     Eustachian tube dysfunction, bilateral 2019    Lumbar disc herniation 02/15/2011    Lumbar radicular pain 02/15/2011     Tension-type headache, not intractable 2018    Formatting of this note might be different from the original. Last Assessment & Plan: Off and on headaches for the last 2-3 weeks, predominantly around her temples, without associated visual change, nausea, vomiting, or light sensitivity.  Minimal caffeine intake.  Some increased stress, lost a friend to cancer, going through process of possibly having third child through surrogate.  Training for      Past Surgical History:   Procedure Laterality Date          ENLARGE BREAST WITH IMPLANT      ENLARGE BREAST WITH IMPLANT         Are you being hurt, frightened, demeaned, or taken advantage of by anyone at your home or in your life?  No     Have you recently had thoughts of hurting yourself?  No    Have you tried to hurt yourself in the past?  No      ASSESSMENT:   Lorri Gunderson presents to therapy with a long hx of LBP that limits her ability to bend/lift/twist, participate in workouts/complete yoga classes.  She presents with a complex pattern of postural compensations with a L AIC pattern so of postural compensation.  She also displays systematic hypermobility, poor IO/TA recruitment with poor core strength and increased pain that limits her mobility.  She will benefit from skilled PT to decrease her pain and return to her PLOF.    Precautions:  scoliosis, ,       OBJECTIVE:   Observation/Posture: scoliosis curvature, anterior pelvic tilt  Gait: bilateral heel toe gait pattern without gross deviation  ROM:     Trunk         Pain (+/-)   Flexion Hands to floor                       Extension Decreased 25%  In the lower lumbar spine   R Sidebend WFL    L Sidebend WFL    R Rotation NT    L Rotation NT    R Sideglide WFL    L Sideglide WFL      Repeated Motion Testing: REIL - decreased/better    SANDRA Testing R L   Apical Expansion WFL WFL   Adduction Drop Test - +   Extension Drop Test - -   SLR 90 90   Trunk Rotation T NT   Posterior  Mediastinum Expansion Test WFL decreased   Standing Reach Test - -   Elevated and ER Anterior Rib NT NT   PART + -   PADT - +   Hrusta Adduction Lift Test 4 4   Hrusta Abduction Lift Test NT NT       STRENGTH:   5/5 MMT Scale   Left  Right Comments   Hip Flexion (L2) 5     5    Knee Extension (L3) 5 5    Knee Flexion 5 5    Ankle DF (L4) 5 5    EHL (L5) 5 4+    Ankle PF (S1) 5 5    Hip Abduction NT NT    Hip Extension NT NT      Flexibility:      R L   Hamstrings long long   Piriformis long long   Hip Flexor long long     Neuro Screen: (-) heel walking and toe walking bilaterally    Special Tests: 4/5 functional squat test    Outcome Surverys  Oswestry Disability Index Score  Score: 8 % (11/28/2023  9:20 AM)

## 2024-01-29 ENCOUNTER — OFFICE VISIT (OUTPATIENT)
Dept: PHYSICAL THERAPY | Facility: HOSPITAL | Age: 39
End: 2024-01-29
Attending: PHYSICAL MEDICINE & REHABILITATION
Payer: COMMERCIAL

## 2024-01-29 PROCEDURE — 97530 THERAPEUTIC ACTIVITIES: CPT | Performed by: PHYSICAL THERAPIST

## 2024-01-29 PROCEDURE — 97112 NEUROMUSCULAR REEDUCATION: CPT | Performed by: PHYSICAL THERAPIST

## 2024-01-29 PROCEDURE — 97110 THERAPEUTIC EXERCISES: CPT | Performed by: PHYSICAL THERAPIST

## 2024-01-29 NOTE — PROGRESS NOTES
2024    Dx:   Diagnosis:   Lumbar disc disease (M51.9)  Lumbar foraminal stenosis (M48.061)  Attention deficit hyperactivity disorder (ADHD), predominantly inattentive type (F90.0)  Asherman's syndrome (N85.6)  Adolescent idiopathic scoliosis of thoracolumbar region (M41.125)  Lumbar herniated disc (M51.26)                  Authorized # of Visits:  18 visits on POC          Next MD visit: none   Fall Risk: standard         Precautions:  18 visits on POC  Medication Changes since last visit?: No    Subjective: Reports her back is feeling pretty good.  States she did all her exercises this morning.  States the L side of her neck and shoulder blade is very sore.  States that has been a problem intermittently for several years.    Pain Ratin-3/10 VAS     Objective:      2024  Visit #4 2024  Visit #5 1/15/2024  Visit #6 2024  Visit #7 2024  Visit #8   Manual Therapy [facility] L4L5 and L5S1    STM/MFR lumbar paraspinals    Bilateral iliopsoas relese  Central PA's L4L5 and L5S1 Central PA\"s L4L5 and L5S1    Therapeutic Exercise  Education on gym based modifications   Twists in sitting  2.  Unilateral rows in sitting  3.  High planks  4.  Side planks    Education on spinal anatomy and needs to keep her pelvis in neutral during activities         Chin tucks in sitting   Therapeutic Activity    Spinal elongation positions for home    Body mechanics for ADL's/household chores Education on use of a contour pillow    Education on use of a Justin cervical roll   Neuromuscular Education  Supine 90/90 repositioning with L hip retraction Paraspinal release with L hamstring    R SL, adductor pull backs    L stance in stance R SL, adductor pull backs    R SL, L glut med    L SL, R glut max    L SL, knee towards knee     L stance with once inch box   L SL R glut max    L SL, resisted R glut max   TNE Education        HEP   Paraspinal release with L hamstring    R SL, adductor pull backs    L stance in  stance R SL, adductor pull backs    R SL, L glut med    L SL, R glut max    L SL, knee towards knee     L stance with once inch box L SL, resisted R glut max         Assessment: No adverse effects to treatment. Less pain this date. But continued to have (+) R ADT/PADT. Focused on R glut max recruitment in SL to further improve frontal plane control now that she as a (-) L ADT.  The pt was also educated in use of cervical contour pillows and use of a Justin cervical roll.  Needs further integration to increased reliance to injury.    Goals:      The pt was educated on the plan of care, purpose and individual goals for therapy, precautions for therapy.  All questions were answered.     1.  The pt will be independent in their HEP.  2.  Centralization of symptoms to the lumbar spine.  3.  The pt will be independent in a modified workout program.  4.  The pt will demonstrate appropriate body mechanics with lifting an object from the ground.  5.  The pt will be able to reports a 50% decrease in symptoms.  6.  The pt will not have a recurrence of symptoms in 3 months..    Frequency / Duration: Patient will be seen for 1-2 x/week or a total of 18 visits over a 90 day period.  Treatment will include: Manual Therapy; Therapeutic Exercises; Neuromuscular Re-education; Therapeutic Activity;  Patient education: Home exercise program instruction; TNE Education; Modalities as needed.    Education or treatment limitation: None  Rehab Potential good      Certification From: 11/28/2023  To:2/26/2024      Charges: NM2 (25) TA1 (10), TE1(10)  Total Timed Treatment: 45min  Total Treatment Time: 45 min        FOR REFERENCE ONLY   LUMBAR SPINE EVALUATION:   Referring Physician: Dr. Aguilar  Diagnosis:   Lumbar disc disease (M51.9)  Lumbar foraminal stenosis (M48.061)  Attention deficit hyperactivity disorder (ADHD), predominantly inattentive type (F90.0)  Asherman's syndrome (N85.6)  Adolescent idiopathic scoliosis of thoracolumbar region  (M41.125)  Lumbar herniated disc (M51.26)     Date of Service: 2023   Date of Onset: several years ago    PATIENT SUMMARY   Lorri Gunderson is a 38 year old y/o female who presents to therapy today with complaints of  L side LBP.  Has had some intermittent sciatic pain into the R buttock and top of the hamstring.  Denies falls/bladder/bowels issues.  Just got a Medrol Dose Pack and Predisone/Zpack last week which helped her pain.       History of current condition: long hx of LBP, first episode at 24 years old - would have a bad flare up that would keep her in bed for a week.  Had seen 3 surgeons about 10 years ago but avoided surgery.  States her back will go out off and on.      Started [employer], [place], [employer] .. And went to to a chiropractor and did better.      Last Feb had a bad flare up - took a Medrol Dose Pack.  States her L side never full recovered.  States she always has pain in her L side.  This summer she would end up in full spasm after a busy fay.  Lies on a heating pad which helps.  States she is better in the morning.        Had a cold Sept/Nov and she got worse from coughing.      No injections recently.      9 y/o. 10 y/o and 3 y/o kids    Worked with [employer] - helped slightly.  Was challenging to attend appointments.      Pain Ratin-2/10 VAS,     Current functional limitations include limited ability to bend/lift/twist, unable to participate in workouts, unable to perform yoga.   Lorri describes prior level of function independent in all activities. Pt goals include decrease pain, improve her overall function.    Past medical history was reviewed with Lorri. Significant findings include umbiliitcal hernia.     Past Medical History:   Diagnosis Date    Chronic pain of both knees 2018    Formatting of this note might be different from the original. Last Assessment & Plan: She is training for marathon, notes pain in her knees that is interfering with her progress. No associated  swelling or joint instability.  Plan:  Referred for PT for further evaluation and management.    Crushing injury of toe of right foot 2015    Esophageal reflux     Eustachian tube dysfunction, bilateral 2019    Lumbar disc herniation 02/15/2011    Lumbar radicular pain 02/15/2011    Tension-type headache, not intractable 2018    Formatting of this note might be different from the original. Last Assessment & Plan: Off and on headaches for the last 2-3 weeks, predominantly around her temples, without associated visual change, nausea, vomiting, or light sensitivity.  Minimal caffeine intake.  Some increased stress, lost a friend to cancer, going through process of possibly having third child through surrogate.  Training for      Past Surgical History:   Procedure Laterality Date          ENLARGE BREAST WITH IMPLANT      ENLARGE BREAST WITH IMPLANT         Are you being hurt, frightened, demeaned, or taken advantage of by anyone at your home or in your life?  No     Have you recently had thoughts of hurting yourself?  No    Have you tried to hurt yourself in the past?  No      ASSESSMENT:   Lorri Gunderson presents to therapy with a long hx of LBP that limits her ability to bend/lift/twist, participate in workouts/complete yoga classes.  She presents with a complex pattern of postural compensations with a L AIC pattern so of postural compensation.  She also displays systematic hypermobility, poor IO/TA recruitment with poor core strength and increased pain that limits her mobility.  She will benefit from skilled PT to decrease her pain and return to her PLOF.    Precautions:  scoliosis, ,       OBJECTIVE:   Observation/Posture: scoliosis curvature, anterior pelvic tilt  Gait: bilateral heel toe gait pattern without gross deviation  ROM:     Trunk         Pain (+/-)   Flexion Hands to floor                       Extension Decreased 25%  In the lower lumbar spine   R Sidebend  WFL    L Sidebend WFL    R Rotation NT    L Rotation NT    R Sideglide WFL    L Sideglide WFL      Repeated Motion Testing: REIL - decreased/better    SANDRA Testing R L   Apical Expansion WFL WFL   Adduction Drop Test - +   Extension Drop Test - -   SLR 90 90   Trunk Rotation T NT   Posterior Mediastinum Expansion Test WFL decreased   Standing Reach Test - -   Elevated and ER Anterior Rib NT NT   PART + -   PADT - +   Hrusta Adduction Lift Test 4 4   Hrusta Abduction Lift Test NT NT       STRENGTH:   5/5 MMT Scale   Left  Right Comments   Hip Flexion (L2) 5     5    Knee Extension (L3) 5 5    Knee Flexion 5 5    Ankle DF (L4) 5 5    EHL (L5) 5 4+    Ankle PF (S1) 5 5    Hip Abduction NT NT    Hip Extension NT NT      Flexibility:      R L   Hamstrings long long   Piriformis long long   Hip Flexor long long     Neuro Screen: (-) heel walking and toe walking bilaterally    Special Tests: 4/5 functional squat test    Outcome Surverys  Oswestry Disability Index Score  Score: 8 % (11/28/2023  9:20 AM)

## 2024-02-05 ENCOUNTER — OFFICE VISIT (OUTPATIENT)
Dept: PHYSICAL THERAPY | Facility: HOSPITAL | Age: 39
End: 2024-02-05
Attending: PHYSICAL MEDICINE & REHABILITATION
Payer: COMMERCIAL

## 2024-02-05 PROCEDURE — 97530 THERAPEUTIC ACTIVITIES: CPT | Performed by: PHYSICAL THERAPIST

## 2024-02-05 PROCEDURE — 97112 NEUROMUSCULAR REEDUCATION: CPT | Performed by: PHYSICAL THERAPIST

## 2024-02-05 NOTE — PROGRESS NOTES
2024    Dx:   Diagnosis:   Lumbar disc disease (M51.9)  Lumbar foraminal stenosis (M48.061)  Attention deficit hyperactivity disorder (ADHD), predominantly inattentive type (F90.0)  Asherman's syndrome (N85.6)  Adolescent idiopathic scoliosis of thoracolumbar region (M41.125)  Lumbar herniated disc (M51.26)                  Authorized # of Visits:  18 visits on POC          Next MD visit: none   Fall Risk: standard         Precautions:  18 visits on POC  Medication Changes since last visit?: No    Subjective: Reports her neck is feeling ok but her mid-back is ok.  L side of her LB has been feeling better.  Hasn't done much     Pain Ratin-3/10 VAS     Objective:      1/15/2024  Visit #6 2024  Visit #7 2024  Visit #8 2024  Visit #9   Manual Therapy Central PA's L4L5 and L5S1 Central PA\"s L4L5 and L5S1     Therapeutic Exercise   Chin tucks in sitting    Therapeutic Activity  Spinal elongation positions for home    Body mechanics for ADL's/household chores Education on use of a contour pillow    Education on use of a Justin cervical roll Education on sensory integration   Neuromuscular Education Paraspinal release with L hamstring    R SL, adductor pull backs    L stance in stance R SL, adductor pull backs    R SL, L glut med    L SL, R glut max    L SL, knee towards knee     L stance with once inch box   L SL R glut max    L SL, resisted R glut max R glut max in supine hooklying    All 4 R glut max       TNE Education       HEP Paraspinal release with L hamstring    R SL, adductor pull backs    L stance in stance R SL, adductor pull backs    R SL, L glut med    L SL, R glut max    L SL, knee towards knee     L stance with once inch box L SL, resisted R glut max R glut max in supine hooklying    All 4 R glut max         Assessment: No adverse effects to treatment. More neutral testing this date.  Only (+) tests were R ADT/PADT and therefore addressed with R glut max activities.  Given those for  HEP.  Extensively educated on sensory integration principles.    Goals:      The pt was educated on the plan of care, purpose and individual goals for therapy, precautions for therapy.  All questions were answered.     1.  The pt will be independent in their HEP.  2.  Centralization of symptoms to the lumbar spine.  3.  The pt will be independent in a modified workout program.  4.  The pt will demonstrate appropriate body mechanics with lifting an object from the ground.  5.  The pt will be able to reports a 50% decrease in symptoms.  6.  The pt will not have a recurrence of symptoms in 3 months..    Frequency / Duration: Patient will be seen for 1-2 x/week or a total of 18 visits over a 90 day period.  Treatment will include: Manual Therapy; Therapeutic Exercises; Neuromuscular Re-education; Therapeutic Activity;  Patient education: Home exercise program instruction; TNE Education; Modalities as needed.    Education or treatment limitation: None  Rehab Potential good      Certification From: 11/28/2023  To:2/26/2024      Charges: NM2 (25) TA1 (15),  Total Timed Treatment: 40 min  Total Treatment Time: 40 min        FOR REFERENCE ONLY   LUMBAR SPINE EVALUATION:   Referring Physician: Dr. Aguilar  Diagnosis:   Lumbar disc disease (M51.9)  Lumbar foraminal stenosis (M48.061)  Attention deficit hyperactivity disorder (ADHD), predominantly inattentive type (F90.0)  Asherman's syndrome (N85.6)  Adolescent idiopathic scoliosis of thoracolumbar region (M41.125)  Lumbar herniated disc (M51.26)     Date of Service: 11/28/2023   Date of Onset: several years ago    PATIENT SUMMARY   Lorri Gunderson is a 38 year old y/o female who presents to therapy today with complaints of  L side LBP.  Has had some intermittent sciatic pain into the R buttock and top of the hamstring.  Denies falls/bladder/bowels issues.  Just got a Medrol Dose Pack and Predisone/Zpack last week which helped her pain.       History of current condition: long  hx of LBP, first episode at 24 years old - would have a bad flare up that would keep her in bed for a week.  Had seen 3 surgeons about 10 years ago but avoided surgery.  States her back will go out off and on.      Started Pil[employer], [place], Daily .. And went to to a chiropractor and did better.      Last Feb had a bad flare up - took a Medrol Dose Pack.  States her L side never full recovered.  States she always has pain in her L side.  This summer she would end up in full spasm after a busy fay.  Lies on a heating pad which helps.  States she is better in the morning.        Had a cold Sept/Nov and she got worse from coughing.      No injections recently.      11 y/o. 10 y/o and 5 y/o kids    Worked with [employer] - helped slightly.  Was challenging to attend appointments.      Pain Ratin-2/10 VAS,     Current functional limitations include limited ability to bend/lift/twist, unable to participate in workouts, unable to perform yoga.   Lorri describes prior level of function independent in all activities. Pt goals include decrease pain, improve her overall function.    Past medical history was reviewed with Lorri. Significant findings include umbiliitcal hernia.     Past Medical History:   Diagnosis Date    Chronic pain of both knees 2018    Formatting of this note might be different from the original. Last Assessment & Plan: She is training for marathon, notes pain in her knees that is interfering with her progress. No associated swelling or joint instability.  Plan:  Referred for PT for further evaluation and management.    Crushing injury of toe of right foot 2015    Esophageal reflux     Eustachian tube dysfunction, bilateral 2019    Lumbar disc herniation 02/15/2011    Lumbar radicular pain 02/15/2011    Tension-type headache, not intractable 2018    Formatting of this note might be different from the original. Last Assessment & Plan: Off and on headaches for the last 2-3 weeks,  predominantly around her temples, without associated visual change, nausea, vomiting, or light sensitivity.  Minimal caffeine intake.  Some increased stress, lost a friend to cancer, going through process of possibly having third child through surrogate.  Training for      Past Surgical History:   Procedure Laterality Date          ENLARGE BREAST WITH IMPLANT      ENLARGE BREAST WITH IMPLANT         Are you being hurt, frightened, demeaned, or taken advantage of by anyone at your home or in your life?  No     Have you recently had thoughts of hurting yourself?  No    Have you tried to hurt yourself in the past?  No      ASSESSMENT:   Lorri Gunderson presents to therapy with a long hx of LBP that limits her ability to bend/lift/twist, participate in workouts/complete yoga classes.  She presents with a complex pattern of postural compensations with a L AIC pattern so of postural compensation.  She also displays systematic hypermobility, poor IO/TA recruitment with poor core strength and increased pain that limits her mobility.  She will benefit from skilled PT to decrease her pain and return to her PLOF.    Precautions:  scoliosis, ,       OBJECTIVE:   Observation/Posture: scoliosis curvature, anterior pelvic tilt  Gait: bilateral heel toe gait pattern without gross deviation  ROM:     Trunk         Pain (+/-)   Flexion Hands to floor                       Extension Decreased 25%  In the lower lumbar spine   R Sidebend WFL    L Sidebend WFL    R Rotation NT    L Rotation NT    R Sideglide WFL    L Sideglide WFL      Repeated Motion Testing: REIL - decreased/better    SANDRA Testing R L   Apical Expansion WFL WFL   Adduction Drop Test - +   Extension Drop Test - -   SLR 90 90   Trunk Rotation T NT   Posterior Mediastinum Expansion Test WFL decreased   Standing Reach Test - -   Elevated and ER Anterior Rib NT NT   PART + -   PADT - +   Hrusta Adduction Lift Test 4 4   Hrusta Abduction Lift Test  NT NT       STRENGTH:   5/5 MMT Scale   Left  Right Comments   Hip Flexion (L2) 5     5    Knee Extension (L3) 5 5    Knee Flexion 5 5    Ankle DF (L4) 5 5    EHL (L5) 5 4+    Ankle PF (S1) 5 5    Hip Abduction NT NT    Hip Extension NT NT      Flexibility:      R L   Hamstrings long long   Piriformis long long   Hip Flexor long long     Neuro Screen: (-) heel walking and toe walking bilaterally    Special Tests: 4/5 functional squat test    Outcome Surverys  Oswestry Disability Index Score  Score: 8 % (11/28/2023  9:20 AM)

## 2024-02-12 ENCOUNTER — OFFICE VISIT (OUTPATIENT)
Dept: PHYSICAL THERAPY | Facility: HOSPITAL | Age: 39
End: 2024-02-12
Attending: PHYSICAL MEDICINE & REHABILITATION
Payer: COMMERCIAL

## 2024-02-12 PROCEDURE — 97112 NEUROMUSCULAR REEDUCATION: CPT | Performed by: PHYSICAL THERAPIST

## 2024-02-12 NOTE — PROGRESS NOTES
2024  Patient Name: Lorri Gunderson  YOB: 1985          MRN number:  [id number]  Referring Physician:  Triston Aguilar    Progress Summary    Dx:   Diagnosis:   Lumbar disc disease (M51.9)  Lumbar foraminal stenosis (M48.061)  Attention deficit hyperactivity disorder (ADHD), predominantly inattentive type (F90.0)  Asherman's syndrome (N85.6)  Adolescent idiopathic scoliosis of thoracolumbar region (M41.125)  Lumbar herniated disc (M51.26)                  Authorized # of Visits:  18 visits on POC          Next MD visit: none   Fall Risk: standard         Precautions:  18 visits on POC  Medication Changes since last visit?: No    Subjective:  States she worked out and states she felt good.  States her upper back is still bother her.  States her LB is feeling really good.  Reports she was able to clean out her garage and didn't have pain afterwards.  Feels like she could go for a run.    Pain Ratin/10 VAS (low back), 2/10 VAS (mid back)     Objective:     Trunk         Pain (+/-)   Flexion Hands to floor                       Extension WFL    R Sidebend WFL    L Sidebend WFL    R Rotation NT    L Rotation NT    R Sideglide WFL    L Sideglide WFL      Repeated Motion Testing: REIL - decreased/better    SANDRA Testing R L   Apical Expansion WFL WFL   Adduction Drop Test - -   Extension Drop Test - -   SLR 90 90   Trunk Rotation NT NT   Posterior Mediastinum Expansion Test WFL decreased   Standing Reach Test - -   Elevated and ER Anterior Rib NT NT   PART + +   PADT - +   Hrusta Adduction Lift Test 4 4   Hrusta Abduction Lift Test NT NT       STRENGTH:   5/5 MMT Scale   Left  Right Comments   Hip Flexion (L2) 5     5    Knee Extension (L3) 5 5    Knee Flexion 5 5    Ankle DF (L4) 5 5    EHL (L5) 5 5    Ankle PF (S1) 5 5    Hip Abduction NT NT    Hip Extension NT NT        Special Tests: 4+/5 functional squat test    Outcome Surverys  Oswestry Disability Index Score  Score: 8 % (2023  9:20  AM)     2/5/2024  Visit #9 2/12/2024  Visit #10   Manual Therapy     Therapeutic Exercise     Therapeutic Activity Education on sensory integration    Neuromuscular Education R glut max in supine hooklying    All 4 R glut max     R glut max in supine hooklying    R glut max in L SL    R glut max against the wall    All 4 R glut max   TNE Education     HEP R glut max in supine hooklying   R glut max in L SL         Assessment: Lorri Gunderson completed 10 visits of PT and has progressed very well.  She now has minimal lumbar spine pain and has returned to workouts. She now display neutral SANDRA testing except for (+) R/L PART and 3/5 on the HADLT.  She does have intermittent mid back pain with decreased mobility in the thoracic spine.  She will benefit from further therapy to increased integration and prevent further injury.    Goals:      The pt was educated on the plan of care, purpose and individual goals for therapy, precautions for therapy.  All questions were answered.     1.  The pt will be independent in their HEP.  NET 2/12/2024  2.  Centralization of symptoms to the lumbar spine.  MET  2/12/2024  3.  The pt will be independent in a modified workout program. MET 2/12/2024  4.  The pt will demonstrate appropriate body mechanics with lifting an object from the ground. MET 2/12/2024  5.  The pt will be able to reports a 50% decrease in symptoms.  MET 2/12/2024  6.  The pt will not have a recurrence of symptoms in 3 months..  PROGRESSING  2/12/2024  7.  The pt will display a 5/5 in the HADLT bilaterally to allow her to return to running.  ADDED 2/12/2024    Frequency / Duration: Patient will be seen for 1 x/week to 1 time every other week or a total of 10 visits over a 90 day period.  Treatment will include: Manual Therapy; Therapeutic Exercises; Neuromuscular Re-education; Therapeutic Activity;  Patient education: Home exercise program instruction; TNE Education; Modalities as needed.    Education or treatment  limitation: None  Rehab Potential good      Charges: NM3 (40)  Total Timed Treatment: 40 min  Total Treatment Time: 40 min    Patient was advised of these findings, precautions, and treatment options and has agreed to actively participate in planning and for this course of care.    Thank you for your referral. Please co-sign or sign and return this letter via fax as soon as possible to [phone]. If you have any questions, please contact me at Dept: [phone].    Sincerely,  MAREK JACINTO PT    Electronically signed by therapist: MAREK JACINTO PT    Physician's certification required: Yes  I certify the need for these services furnished under this plan of treatment and while under my care.    X___________________________________________________ Date____________________    Certification From: 2/14/2024  To:5/14/2024

## 2024-02-19 ENCOUNTER — APPOINTMENT (OUTPATIENT)
Dept: PHYSICAL THERAPY | Facility: HOSPITAL | Age: 39
End: 2024-02-19
Attending: PHYSICAL MEDICINE & REHABILITATION
Payer: COMMERCIAL

## 2024-03-01 ENCOUNTER — OFFICE VISIT (OUTPATIENT)
Dept: PHYSICAL THERAPY | Facility: HOSPITAL | Age: 39
End: 2024-03-01
Attending: PHYSICAL MEDICINE & REHABILITATION
Payer: COMMERCIAL

## 2024-03-01 PROCEDURE — 97112 NEUROMUSCULAR REEDUCATION: CPT | Performed by: PHYSICAL THERAPIST

## 2024-03-01 PROCEDURE — 97530 THERAPEUTIC ACTIVITIES: CPT | Performed by: PHYSICAL THERAPIST

## 2024-03-01 NOTE — PROGRESS NOTES
3/1/2024  Patient Name: Lorri Gunderson  YOB: 1985          MRN number:  [id number]  Referring Physician:  Triston Aguilar      Dx:   Diagnosis:   Lumbar disc disease (M51.9)  Lumbar foraminal stenosis (M48.061)  Attention deficit hyperactivity disorder (ADHD), predominantly inattentive type (F90.0)  Asherman's syndrome (N85.6)  Adolescent idiopathic scoliosis of thoracolumbar region (M41.125)  Lumbar herniated disc (M51.26)                  Authorized # of Visits:  18 visits on POC          Next MD visit: none   Fall Risk: standard         Precautions:  18 visits on POC  Medication Changes since last visit?: No    Subjective:  States she is doing well overall.  States she one week with slight back pain when she wasn't able to work out.  States she did run once and was slightly sore after that.  Reports she felt like her R shut off with when she was running.  Reports she did feel better after her HEP.  Pain Ratin/10 VAS (low back), 2/10 VAS (mid back)     Objective:     Trunk         Pain (+/-)   Flexion Hands to floor                       Extension WFL    R Sidebend WFL    L Sidebend WFL    R Rotation NT    L Rotation NT    R Sideglide WFL    L Sideglide WFL      Repeated Motion Testing: REIL - decreased/better    SANDRA Testing R L   Apical Expansion WFL WFL   Adduction Drop Test - -   Extension Drop Test - -   SLR 90 90   Trunk Rotation NT NT   Posterior Mediastinum Expansion Test WFL decreased   Standing Reach Test - -   Elevated and ER Anterior Rib NT NT   PART + +   PADT - +   Hrusta Adduction Lift Test 4 4   Hrusta Abduction Lift Test NT NT       STRENGTH:   5/5 MMT Scale   Left  Right Comments   Hip Flexion (L2) 5     5    Knee Extension (L3) 5 5    Knee Flexion 5 5    Ankle DF (L4) 5 5    EHL (L5) 5 5    Ankle PF (S1) 5 5    Hip Abduction NT NT    Hip Extension NT NT        Special Tests: 4+/5 functional squat test    Outcome Surverys  Oswestry Disability Index Score  Score: 8 %  (11/28/2023  9:20 AM)     2/5/2024  Visit #9 2/12/2024  Visit #10 3/1/2024  Visit #11   Manual Therapy      Therapeutic Exercise      Therapeutic Activity Education on sensory integration  Performing her R glut max activities before and after she works out/runs   Neuromuscular Education R glut max in supine hooklying    All 4 R glut max     R glut max in supine hooklying    R glut max in L SL    R glut max against the wall    All 4 R glut max R glut max in L SL    Standing integrated R glut max against the wall   TNE Education      HEP R glut max in supine hooklying   R glut max in L SL Standing integrated R glut max against the wall         Assessment: No adverse effects to treatment.  The pt was instructed to perform R glut max activities before and after high level activities such as running and class workouts.  Neutral exam this date.  Will continue PT once every 3-4 weeks to monitor progress and progress her HEP.    Goals:      The pt was educated on the plan of care, purpose and individual goals for therapy, precautions for therapy.  All questions were answered.     1.  The pt will be independent in their HEP.  NET 2/12/2024  2.  Centralization of symptoms to the lumbar spine.  MET  2/12/2024  3.  The pt will be independent in a modified workout program. MET 2/12/2024  4.  The pt will demonstrate appropriate body mechanics with lifting an object from the ground. MET 2/12/2024  5.  The pt will be able to reports a 50% decrease in symptoms.  MET 2/12/2024  6.  The pt will not have a recurrence of symptoms in 3 months..  PROGRESSING  2/12/2024  7.  The pt will display a 5/5 in the HADLT bilaterally to allow her to return to running.  ADDED 2/12/2024    Frequency / Duration: Patient will be seen for 1 x/week to 1 time every other week or a total of 10 visits over a 90 day period.  Treatment will include: Manual Therapy; Therapeutic Exercises; Neuromuscular Re-education; Therapeutic Activity;  Patient education:  Home exercise program instruction; TNE Education; Modalities as needed.    Education or treatment limitation: None  Rehab Potential good      Charges: TA1 (15), NM2 (23) Total Timed Treatment: 38 min  Total Treatment Time: 38 min    Patient was advised of these findings, precautions, and treatment options and has agreed to actively participate in planning and for this course of care.    Thank you for your referral. Please co-sign or sign and return this letter via fax as soon as possible to [phone]. If you have any questions, please contact me at Dept: [phone].    Sincerely,  MAREK JACINTO PT    Electronically signed by therapist: MAREK JACINTO PT    Physician's certification required: Yes  I certify the need for these services furnished under this plan of treatment and while under my care.    X___________________________________________________ Date____________________    Certification From: 2/14/2024  To:5/14/2024

## 2024-04-16 ENCOUNTER — OFFICE VISIT (OUTPATIENT)
Dept: INTERNAL MEDICINE CLINIC | Facility: CLINIC | Age: 39
End: 2024-04-16
Payer: COMMERCIAL

## 2024-04-16 VITALS
HEART RATE: 91 BPM | TEMPERATURE: 98 F | DIASTOLIC BLOOD PRESSURE: 73 MMHG | BODY MASS INDEX: 21.16 KG/M2 | SYSTOLIC BLOOD PRESSURE: 101 MMHG | HEIGHT: 65 IN | WEIGHT: 127 LBS | RESPIRATION RATE: 16 BRPM

## 2024-04-16 DIAGNOSIS — R68.89 FLU-LIKE SYMPTOMS: Primary | ICD-10-CM

## 2024-04-16 PROCEDURE — 3078F DIAST BP <80 MM HG: CPT | Performed by: NURSE PRACTITIONER

## 2024-04-16 PROCEDURE — 99213 OFFICE O/P EST LOW 20 MIN: CPT | Performed by: NURSE PRACTITIONER

## 2024-04-16 PROCEDURE — 3074F SYST BP LT 130 MM HG: CPT | Performed by: NURSE PRACTITIONER

## 2024-04-16 PROCEDURE — 3008F BODY MASS INDEX DOCD: CPT | Performed by: NURSE PRACTITIONER

## 2024-04-16 RX ORDER — BENZONATATE 200 MG/1
200 CAPSULE ORAL 3 TIMES DAILY PRN
Qty: 30 CAPSULE | Refills: 0 | Status: SHIPPED | OUTPATIENT
Start: 2024-04-16

## 2024-04-16 NOTE — PROGRESS NOTES
Lorri Gunderson is a 38 year old female.  Chief Complaint   Patient presents with    Cough     Congestion,bodyaches fever     HPI:   She presents with cough, fevers, decreased energy, fatigue, body aches, runny nose and congestion.     Her symptoms started on . She has been taking ibuprofen and using her Symbicort inhaler.     She did a home Covid-19 test that was negative. She took a home test yesterday.     She denies any SOB or chest pain.   Current Outpatient Medications   Medication Sig Dispense Refill    benzonatate 200 MG Oral Cap Take 1 capsule (200 mg total) by mouth 3 (three) times daily as needed for cough. 30 capsule 0      Past Medical History:    Chronic pain of both knees    Formatting of this note might be different from the original. Last Assessment & Plan: She is training for marathon, notes pain in her knees that is interfering with her progress. No associated swelling or joint instability.  Plan:  Referred for PT for further evaluation and management.    Crushing injury of toe of right foot    Esophageal reflux    Eustachian tube dysfunction, bilateral    Lumbar disc herniation    Lumbar radicular pain    Tension-type headache, not intractable    Formatting of this note might be different from the original. Last Assessment & Plan: Off and on headaches for the last 2-3 weeks, predominantly around her temples, without associated visual change, nausea, vomiting, or light sensitivity.  Minimal caffeine intake.  Some increased stress, lost a friend to cancer, going through process of possibly having third child through surrogate.  Training for       Past Surgical History:   Procedure Laterality Date          Enlarge breast with implant      Enlarge breast with implant        Social History:  Social History     Socioeconomic History    Marital status:    Tobacco Use    Smoking status: Never    Smokeless tobacco: Never    Tobacco comments:     social in [employer]    Vaping  Use    Vaping status: Never Used   Substance and Sexual Activity    Alcohol use: Never    Drug use: Never      History reviewed. No pertinent family history.   Allergies   Allergen Reactions    Sulfa Antibiotics RASH, HIVES and OTHER (SEE COMMENTS)     Cerner Allergy Text Annotation: sulfa drugs, Cerner Reaction: rash      Kiwi Extract ITCHING        REVIEW OF SYSTEMS:     Review of Systems   Constitutional:  Positive for fatigue and fever.   HENT:  Positive for congestion, postnasal drip and rhinorrhea. Negative for ear pain and sore throat.    Respiratory:  Positive for cough. Negative for shortness of breath and wheezing.    Cardiovascular:  Negative for chest pain.   Gastrointestinal:  Negative for abdominal pain, diarrhea, nausea and vomiting.   Genitourinary: Negative.    Musculoskeletal:  Positive for arthralgias.   Neurological:  Positive for headaches.   Psychiatric/Behavioral: Negative.        Wt Readings from Last 5 Encounters:   04/16/24 127 lb (57.6 kg)   12/12/23 127 lb (57.6 kg)   12/01/23 127 lb (57.6 kg)   11/20/23 127 lb (57.6 kg)   11/16/23 127 lb (57.6 kg)     Body mass index is 21.13 kg/m².      EXAM:   /73   Pulse 91   Temp 98.1 °F (36.7 °C)   Resp 16   Ht 5' 5\" (1.651 m)   Wt 127 lb (57.6 kg)   LMP 12/04/2023 (Approximate)   BMI 21.13 kg/m²     Physical Exam  Vitals reviewed.   Constitutional:       Appearance: Normal appearance.   HENT:      Head: Normocephalic.      Right Ear: Tympanic membrane normal.      Left Ear: Tympanic membrane normal.      Nose: Congestion present.      Mouth/Throat:      Pharynx: No posterior oropharyngeal erythema.   Eyes:      Extraocular Movements: Extraocular movements intact.      Pupils: Pupils are equal, round, and reactive to light.   Cardiovascular:      Rate and Rhythm: Normal rate and regular rhythm.      Pulses: Normal pulses.   Pulmonary:      Breath sounds: Normal breath sounds. No wheezing.   Abdominal:      General: Bowel sounds are  normal.      Palpations: Abdomen is soft.   Musculoskeletal:         General: No swelling. Normal range of motion.      Cervical back: Normal range of motion and neck supple.   Skin:     General: Skin is warm and dry.   Neurological:      Mental Status: She is alert and oriented to person, place, and time.   Psychiatric:         Mood and Affect: Mood normal.         Behavior: Behavior normal.            ASSESSMENT AND PLAN:   1. Flu-like symptoms  - increase fluid intake   - continue tylenol and motrin as needed for body aches and temperature   - tessalon pearls sent to pharmacy   - SARS-CoV-2/Flu A and B/RSV by PCR ([facility]); Future  - SARS-CoV-2/Flu A and B/RSV by PCR ([facility])      The patient indicates understanding of these issues and agrees to the plan.  Return for if symptoms do not resolve.

## 2024-04-18 ENCOUNTER — TELEPHONE (OUTPATIENT)
Dept: INTERNAL MEDICINE CLINIC | Facility: CLINIC | Age: 39
End: 2024-04-18

## 2024-04-18 LAB
FLUAV + FLUBV RNA SPEC NAA+PROBE: DETECTED
FLUAV + FLUBV RNA SPEC NAA+PROBE: NOT DETECTED
RSV RNA SPEC NAA+PROBE: NOT DETECTED
SARS-COV-2 RNA RESP QL NAA+PROBE: NOT DETECTED

## 2024-04-18 NOTE — TELEPHONE ENCOUNTER
Patient did test positive for flu. Unfortunately the tamiflu can only be given within 48 hours of symptoms so even if we had gotten her result yesterday she would have been outside the window as her symptoms started Sunday. Flu symptoms are usually improving by day 7, so she should start feeling better in the next few days. Please contact the office if she needs anything else but I would not recommend chest x-ray at this time unless worsening with shortness of breath or high fevers.

## 2024-04-18 NOTE — TELEPHONE ENCOUNTER
for Annette Abraham (pod mates are not in the office)    Pt stated that she was in on 4/16 so she can get swab for the flu she was inform the result will return in 24hrs and we still do not have a result for her. Pt is upset that we do not have rapid  FLU test. Pt stated that she is feeling a little better but she still has a deep cough and she is coughing up yellow phlegm. Pt stated that she was told if the FLU test was negative then a chest x-ray will be ordered.   I did call lab and was inform that the Flu test takes 48-72 hrs NOT 24 hrs and pt was collected later in the day. Per lab the result might be in later today or tomorrow. Pt was inform of this information and she was very upset because if she does have the FLU it is to late to take the Tamiflu since her s/sx started on Sunday. Pt will like to know if a chest x ray can be ordered for her.     Pt also wants to know for future reference where she can go to get a rapid flu swab done.

## 2024-04-18 NOTE — TELEPHONE ENCOUNTER
Rapid flu tests can be done at urgent care or the [facility] through location.   The test she had done takes about 48 hours per lab, I just confirmed with them.   Results should hopefully be back today, I will wait for test result and if negative will order chest x-ray

## 2024-04-18 NOTE — TELEPHONE ENCOUNTER
Pt was called and inform of Sofia Hardin message below and she verbalized understanding. Pt will like a call once her results are in.

## 2024-05-03 ENCOUNTER — APPOINTMENT (OUTPATIENT)
Dept: PHYSICAL THERAPY | Facility: HOSPITAL | Age: 39
End: 2024-05-03
Attending: PHYSICAL MEDICINE & REHABILITATION
Payer: COMMERCIAL

## 2024-05-09 ENCOUNTER — OFFICE VISIT (OUTPATIENT)
Dept: INTERNAL MEDICINE CLINIC | Facility: CLINIC | Age: 39
End: 2024-05-09
Payer: COMMERCIAL

## 2024-05-09 ENCOUNTER — TELEPHONE (OUTPATIENT)
Dept: PHYSICAL THERAPY | Facility: HOSPITAL | Age: 39
End: 2024-05-09

## 2024-05-09 ENCOUNTER — ORDER TRANSCRIPTION (OUTPATIENT)
Dept: ADMINISTRATIVE | Facility: HOSPITAL | Age: 39
End: 2024-05-09

## 2024-05-09 VITALS
DIASTOLIC BLOOD PRESSURE: 72 MMHG | OXYGEN SATURATION: 98 % | HEIGHT: 65 IN | BODY MASS INDEX: 21.16 KG/M2 | HEART RATE: 89 BPM | SYSTOLIC BLOOD PRESSURE: 102 MMHG | WEIGHT: 127 LBS

## 2024-05-09 DIAGNOSIS — R05.3 CHRONIC COUGH: ICD-10-CM

## 2024-05-09 DIAGNOSIS — Z12.31 ENCOUNTER FOR SCREENING MAMMOGRAM FOR MALIGNANT NEOPLASM OF BREAST: Primary | ICD-10-CM

## 2024-05-09 DIAGNOSIS — J20.9 SUBACUTE BRONCHITIS: Primary | ICD-10-CM

## 2024-05-09 PROCEDURE — 99214 OFFICE O/P EST MOD 30 MIN: CPT | Performed by: INTERNAL MEDICINE

## 2024-05-09 PROCEDURE — 3078F DIAST BP <80 MM HG: CPT | Performed by: INTERNAL MEDICINE

## 2024-05-09 PROCEDURE — 3008F BODY MASS INDEX DOCD: CPT | Performed by: INTERNAL MEDICINE

## 2024-05-09 PROCEDURE — 3074F SYST BP LT 130 MM HG: CPT | Performed by: INTERNAL MEDICINE

## 2024-05-09 RX ORDER — PREDNISONE 20 MG/1
40 TABLET ORAL DAILY
Qty: 10 TABLET | Refills: 0 | Status: SHIPPED | OUTPATIENT
Start: 2024-05-09 | End: 2024-05-14

## 2024-05-09 NOTE — PROGRESS NOTES
Lorri Gunderson is a 38 year old female who is here for  1. Subacute bronchitis    2. Chronic cough          HPI:   Lorri Gunedrson is a 38 year old female presented with lingering cough.     She had flu A 2024  Similar presentation few months ago, got better   Post nasal drip, tried allergy medicine, Mucinex, tessalon Perles  Have not tried nasal spray    Past Medical History:    Chronic pain of both knees    Formatting of this note might be different from the original. Last Assessment & Plan: She is training for marathon, notes pain in her knees that is interfering with her progress. No associated swelling or joint instability.  Plan:  Referred for PT for further evaluation and management.    Crushing injury of toe of right foot    Esophageal reflux    Eustachian tube dysfunction, bilateral    Lumbar disc herniation    Lumbar radicular pain    Tension-type headache, not intractable    Formatting of this note might be different from the original. Last Assessment & Plan: Off and on headaches for the last 2-3 weeks, predominantly around her temples, without associated visual change, nausea, vomiting, or light sensitivity.  Minimal caffeine intake.  Some increased stress, lost a friend to cancer, going through process of possibly having third child through surrogate.  Training for      Past Surgical History:   Procedure Laterality Date          Enlarge breast with implant      Enlarge breast with implant         Current Outpatient Medications:     predniSONE 20 MG Oral Tab, Take 2 tablets (40 mg total) by mouth daily for 5 days., Disp: 10 tablet, Rfl: 0    benzonatate 200 MG Oral Cap, Take 1 capsule (200 mg total) by mouth 3 (three) times daily as needed for cough. (Patient not taking: Reported on 2024), Disp: 30 capsule, Rfl: 0    Allergies:  Allergies   Allergen Reactions    Sulfa Antibiotics RASH, HIVES and OTHER (SEE COMMENTS)     Cerner Allergy Text Annotation: sulfa drugs, Estephanie  Reaction: rash      Kiwi Extract ITCHING     Social History     Socioeconomic History    Marital status:      Spouse name: Not on file    Number of children: Not on file    Years of education: Not on file    Highest education level: Not on file   Occupational History    Not on file   Tobacco Use    Smoking status: Never    Smokeless tobacco: Never    Tobacco comments:     social in college    Vaping Use    Vaping status: Never Used   Substance and Sexual Activity    Alcohol use: Never    Drug use: Never    Sexual activity: Not on file   Other Topics Concern    Not on file   Social History Narrative    Not on file     Social Determinants of Health     Financial Resource Strain: Not on file   Food Insecurity: Not on file   Transportation Needs: Not on file   Physical Activity: Not on file   Stress: Not on file   Social Connections: Not on file   Housing Stability: Not on file       REVIEW OF SYSTEMS:     GENERAL HEALTH: No fevers, chills, sweats, fatigue  VISION: No recent vision problems, blurry vision or double vision  HEENT: No decreased hearing ear pain nasal congestion or sore throat  SKIN: denies any unusual skin lesions or rashes  RESPIRATORY: denies shortness of breath, +cough, -wheezing  CARDIOVASCULAR: denies chest pain on exertion, palpitations, swelling in feet  GI: denies abdominal pain and denies heartburn, nausea or vomiting  : No Pain on urination, change in the color of urine, discharge, urinating frequently  MUS: No back pain, joint pain, muscle pain  NEURO: denies headaches , anxiety, depression    EXAM:     Vitals:    05/09/24 0904   BP: 102/72   Pulse: 89   SpO2: 98%   Weight: 127 lb (57.6 kg)   Height: 5' 5\" (1.651 m)     GENERAL: well developed, well nourished,in no apparent distress.  SKIN: no rashes,no suspicious lesions  HEENT: atraumatic, normocephalic,ears and throat are clear,   NECK: supple,no adenopathy,  LUNGS: clear to auscultation, no wheeze  CARDIO: RRR without murmur  GI:  good BS's,no masses or tenderness  EXTREMITIES: no cyanosis, or edema    ASSESSMENT AND PLAN:     1. Subacute bronchitis  2. Chronic cough  -patient has been having on-off chronic cough after getting viral illnesses, usually does not get better unless she gets prednisone  -hx of remote smoking in college (1-2 cig/daily for 4 years)  -tried allergy medications, albuterol inh, symbicort without improvement  Plan  - predniSONE 20 MG Oral Tab; Take 2 tablets (40 mg total) by mouth daily for 5 days.  Dispense: 10 tablet; Refill: 0  -complete PFT ordered back in November, needs to schedule    The patient indicates understanding of these issues and agrees to the plan.    Return if symptoms worsen or fail to improve.        Kristy New MD  5/9/2024

## 2024-05-15 ENCOUNTER — HOSPITAL ENCOUNTER (OUTPATIENT)
Dept: RESPIRATORY THERAPY | Facility: HOSPITAL | Age: 39
Discharge: HOME | End: 2024-05-15
Attending: NURSE PRACTITIONER

## 2024-05-15 DIAGNOSIS — R05.3 CHRONIC COUGH: ICD-10-CM

## 2024-05-15 PROCEDURE — 94729 DIFFUSING CAPACITY: CPT | Performed by: INTERNAL MEDICINE

## 2024-05-15 PROCEDURE — 94726 PLETHYSMOGRAPHY LUNG VOLUMES: CPT | Performed by: INTERNAL MEDICINE

## 2024-05-15 PROCEDURE — 94010 BREATHING CAPACITY TEST: CPT | Performed by: INTERNAL MEDICINE

## 2024-05-16 NOTE — PROCEDURES
[facility]  part of [facility]     Pulmonary Function Test     Lorri Gunderson Patient Status:  Outpatient    1985 MRN [id number]   Date of Exam 5/15/24 PCP Magy Dorsey MD           Spirometry   FEV1: 3.39 110%  FVC: 4.58 123%  FEV1/FVC: 0.74    Lung Volume   T.54 117%  RV : 1.96 142%    Diffusion Capacity   DLCO: 22.10 95%    Flow Volume Loop       Impression   No evidence of obstructive defect seen.  Unable to comment on postbronchodilator responses not performed.  Normal lung volumes with some air trapping present.  Normal diffusion capacity    Manasa Rain DO  Pulmonary Critical Care Medicine  [facility]

## 2024-05-19 DIAGNOSIS — R05.9 COUGH, UNSPECIFIED TYPE: Primary | ICD-10-CM

## 2024-07-02 ENCOUNTER — LAB ENCOUNTER (OUTPATIENT)
Dept: LAB | Age: 39
End: 2024-07-02
Attending: NURSE PRACTITIONER
Payer: COMMERCIAL

## 2024-07-02 DIAGNOSIS — R05.9 COUGH, UNSPECIFIED TYPE: ICD-10-CM

## 2024-07-02 LAB — VIT D+METAB SERPL-MCNC: 27.2 NG/ML (ref 30–100)

## 2024-07-02 PROCEDURE — 36415 COLL VENOUS BLD VENIPUNCTURE: CPT

## 2024-07-02 PROCEDURE — 82785 ASSAY OF IGE: CPT

## 2024-07-02 PROCEDURE — 86003 ALLG SPEC IGE CRUDE XTRC EA: CPT

## 2024-07-02 PROCEDURE — 82306 VITAMIN D 25 HYDROXY: CPT

## 2024-07-03 LAB
ALLERGEN BRAZIL NUT: <0.1 KUA/L (ref ?–0.1)
ALMOND IGE QN: <0.1 KUA/L (ref ?–0.1)
CASHEW NUT IGE QN: <0.1 KUA/L (ref ?–0.1)
CLAM IGE QN: <0.1 KUA/L (ref ?–0.1)
CODFISH IGE QN: <0.1 KUA/L (ref ?–0.1)
CORN IGE QN: <0.1 KUA/L (ref ?–0.1)
COW MILK IGE QN: <0.1 KUA/L (ref ?–0.1)
EGG WHITE IGE QN: <0.1 KUA/L (ref ?–0.1)
HAZELNUT IGE QN: <0.1 KUA/L (ref ?–0.1)
IGE SERPL-ACNC: 29.4 KU/L (ref 2–214)
PEANUT IGE QN: <0.1 KUA/L (ref ?–0.1)
SALMON IGE QN: <0.1 KUA/L (ref ?–0.1)
SCALLOP IGE QN: <0.1 KUA/L (ref ?–0.1)
SESAME SEED IGE QN: <0.1 KUA/L (ref ?–0.1)
SHRIMP IGE QN: <0.1 KUA/L (ref ?–0.1)
SOYBEAN IGE QN: <0.1 KUA/L (ref ?–0.1)
WALNUT IGE QN: <0.1 KUA/L (ref ?–0.1)
WHEAT IGE QN: <0.1 KUA/L (ref ?–0.1)

## 2024-07-30 ENCOUNTER — HOSPITAL ENCOUNTER (OUTPATIENT)
Dept: MAMMOGRAPHY | Facility: HOSPITAL | Age: 39
Discharge: HOME | End: 2024-07-30
Attending: OBSTETRICS & GYNECOLOGY
Payer: COMMERCIAL

## 2024-07-30 DIAGNOSIS — Z12.31 ENCOUNTER FOR SCREENING MAMMOGRAM FOR MALIGNANT NEOPLASM OF BREAST: ICD-10-CM

## 2024-07-30 PROCEDURE — 77063 BREAST TOMOSYNTHESIS BI: CPT | Performed by: OBSTETRICS & GYNECOLOGY

## 2024-07-30 PROCEDURE — 77067 SCR MAMMO BI INCL CAD: CPT | Performed by: OBSTETRICS & GYNECOLOGY

## 2024-08-15 ENCOUNTER — TELEPHONE (OUTPATIENT)
Dept: PHYSICAL MEDICINE AND REHAB | Facility: CLINIC | Age: 39
End: 2024-08-15

## 2024-08-15 NOTE — TELEPHONE ENCOUNTER
Pt calling, needs another referral for physical therapy, would like to start up again. Would also like to discuss potential rx for meloxicam. Please advise.

## 2024-08-16 NOTE — TELEPHONE ENCOUNTER
Patient calling to request a call back from the clinical team today, as she is in need of pain medication urgently. Please call to advice

## 2024-08-19 ENCOUNTER — PATIENT MESSAGE (OUTPATIENT)
Dept: INTERNAL MEDICINE CLINIC | Facility: CLINIC | Age: 39
End: 2024-08-19

## 2024-08-19 DIAGNOSIS — M51.9 LUMBAR DISC DISEASE: ICD-10-CM

## 2024-08-19 DIAGNOSIS — M41.125 ADOLESCENT IDIOPATHIC SCOLIOSIS OF THORACOLUMBAR REGION: ICD-10-CM

## 2024-08-19 DIAGNOSIS — M51.26 LUMBAR HERNIATED DISC: Primary | ICD-10-CM

## 2024-08-19 DIAGNOSIS — M48.061 LUMBAR FORAMINAL STENOSIS: ICD-10-CM

## 2024-08-20 ENCOUNTER — OFFICE VISIT (OUTPATIENT)
Dept: INTERNAL MEDICINE CLINIC | Facility: CLINIC | Age: 39
End: 2024-08-20

## 2024-08-20 VITALS
RESPIRATION RATE: 18 BRPM | WEIGHT: 131 LBS | BODY MASS INDEX: 21.83 KG/M2 | HEIGHT: 65 IN | SYSTOLIC BLOOD PRESSURE: 114 MMHG | TEMPERATURE: 98 F | HEART RATE: 86 BPM | OXYGEN SATURATION: 98 % | DIASTOLIC BLOOD PRESSURE: 72 MMHG

## 2024-08-20 DIAGNOSIS — M51.37 DEGENERATION OF LUMBAR OR LUMBOSACRAL INTERVERTEBRAL DISC: ICD-10-CM

## 2024-08-20 DIAGNOSIS — G89.29 CHRONIC BILATERAL LOW BACK PAIN WITHOUT SCIATICA: Primary | ICD-10-CM

## 2024-08-20 DIAGNOSIS — M54.50 CHRONIC BILATERAL LOW BACK PAIN WITHOUT SCIATICA: Primary | ICD-10-CM

## 2024-08-20 PROCEDURE — 3008F BODY MASS INDEX DOCD: CPT | Performed by: INTERNAL MEDICINE

## 2024-08-20 PROCEDURE — 99213 OFFICE O/P EST LOW 20 MIN: CPT | Performed by: INTERNAL MEDICINE

## 2024-08-20 PROCEDURE — 3074F SYST BP LT 130 MM HG: CPT | Performed by: INTERNAL MEDICINE

## 2024-08-20 PROCEDURE — 3078F DIAST BP <80 MM HG: CPT | Performed by: INTERNAL MEDICINE

## 2024-08-20 RX ORDER — METHYLPREDNISOLONE 4 MG
TABLET, DOSE PACK ORAL
Qty: 1 EACH | Refills: 0 | Status: SHIPPED | OUTPATIENT
Start: 2024-08-20

## 2024-08-20 RX ORDER — MELOXICAM 15 MG/1
15 TABLET ORAL DAILY
Qty: 30 TABLET | Refills: 0 | Status: SHIPPED | OUTPATIENT
Start: 2024-08-20

## 2024-08-20 NOTE — TELEPHONE ENCOUNTER
LOV: 11/16/2023 w/ Dr. Aguilar  LOV PLAN:   Plan  \"She will see Nisreen for the PT.     She will se the Chiropractor as needed.     The patient does not need any pain medications at this time.     She will take NSIAD's as needed.     The patient will follow up in 6 months, but the patient will call me 2 weeks after having the injection to let me know how the injection worked.\"    ___________________________________________    Patient is asking for extension of PT- new referral for PT  Patient overdue for follow up.     Patient will reach out to PCP for order.    Patient not sure if can get Meloxicam order and PT to help keep her from her     Location of Pain: Low back- centralized  Old or new pain: old but worsening  Date Pain Began: approximately 6 weeks ago  and last ten days more severe where impacting her life        Numeric Rating Scale:  Pain at Present:  4/10                                                                                                            (No Pain) 0  to  10 (Worst Pain)                 Minimum Pain:   2  Maximum Pain  6    Distribution of Pain:    bilateral  Quality of Pain:   aching and sharp/stabbing  Aggravating Factors:    Sitting  Current pain treatment: heat/ice, PT, and OTC medications    LOV:11/16/2023 Triston Aguilar MD   NOV: 9/30/2024 Triston Aguilar MD     Summary of patient request: PT order and order for Meloxicam until follow up visit on 9/30/2024 . Patient also looking for a chiropractor in the area-lives in [place]-

## 2024-08-20 NOTE — PATIENT INSTRUCTIONS
Lumbar Extension (Flexibility)    Lie face down on your stomach, forehead on the floor. You can lie on a mat or towel.  Bend your arms next to your body and lift your upper body up on your forearms. Your palms and forearms should be flat on the floor. Keep your stomach and hips on the floor.  Hold your upper body up with your forearms for 20 seconds. Slowly lower back down to the floor.  Repeat 2 times, or as instructed.  Lumbar Flexion (Flexibility)    Lie on your back on the floor, with your knees bent and your feet flat on the floor.  Gently pull your knees up toward your chest. Put your hands under your thighs to help pull your knees up.  Press your lower back down to the floor. Hold for 20 seconds.  Lower your legs back down to the floor and relax.  Repeat 2 times, or as instructed.  Lumbar Rotation    Lie on your back on the floor, with your knees bent and your feet flat on the floor. Don’t press your neck or lower back to the floor.  Lean both of your knees to one side. Turn your head in the opposite direction. Keep your shoulders flat on the floor. Be gentle and don’t push through pain.  Hold for 20 seconds. Then slowly move your knees and head in the other direction.  Repeat 2 to 5 times, or as instructed.   Lumbar Stretch (Flexibility)    Lie on your back on the floor, with your knees bent and your feet flat on the floor. Don’t press your neck or lower back to the floor.  Pull one knee up toward your chest. Clasp your hands under your thigh to help pull.  Hold for 30 to 60 seconds. Lower your leg back down to the floor.  Repeat 2 times, or as instructed.  Switch legs and repeat.    © 4907-4112 [employer]. [address] [place] [address], [place] [address]. All rights reserved. This information is not intended as a substitute for professional medical care. Always follow your healthcare professional's instructions.

## 2024-08-20 NOTE — TELEPHONE ENCOUNTER
Patient calling, has tried several times to reach out to Dr. Aguilar and no response \"for about a week now\".  Patient could go to PT today at 2:15 if order in place, she says N[facility] PT has also messaged doctors to see if referral can be placed.     Patient is asking for PT, medrol dose arthur and meloxicam. This back pain is a chronic problem. She admits she went horse back riding and she is having  flare. She says this treatment would be the same as she has had before and it works     She did take visit time this afternoon because she wants to get this done but still wants message sent to Dr. Dorsey to review if possible prior in hopes she does not want to keep visit today that is scheduled.     Please review and advise.   Also sending to Dr. Aguilar for review. See telephone encounter dated 8/15/24 for Dr. Aguilar and staff.     Future Appointments   Date Time Provider Department Center   8/20/2024  3:15 PM Valerio Brown MD [facility]

## 2024-08-20 NOTE — PROGRESS NOTES
Patient ID: Lorri Gunderson is a 39 year old female.  Chief Complaint   Patient presents with    Back Pain        HISTORY OF PRESENT ILLNESS:   HPI  Patient presents for above.  Here with longstanding history of low back pain.  Has known scoliosis of her back for which she sees physiatry.  Has received several steroid injections as well as oral steroids in the past.  Having progressively worse pain that she would like to take care of before becomes a significant issue.  No radiculopathy.  No bowel or bladder incontinence.    Review of Systems  Ten point review of systems otherwise negative with the exception of HPI and assessment and plan.    MEDICAL HISTORY:     Past Medical History:    Chronic pain of both knees    Formatting of this note might be different from the original. Last Assessment & Plan: She is training for marathon, notes pain in her knees that is interfering with her progress. No associated swelling or joint instability.  Plan:  Referred for PT for further evaluation and management.    Crushing injury of toe of right foot    Esophageal reflux    Eustachian tube dysfunction, bilateral    Lumbar disc herniation    Lumbar radicular pain    Tension-type headache, not intractable    Formatting of this note might be different from the original. Last Assessment & Plan: Off and on headaches for the last 2-3 weeks, predominantly around her temples, without associated visual change, nausea, vomiting, or light sensitivity.  Minimal caffeine intake.  Some increased stress, lost a friend to cancer, going through process of possibly having third child through surrogate.  Training for        Past Surgical History:   Procedure Laterality Date          Enlarge breast with implant      Enlarge breast with implant           Current Outpatient Medications:     methylPREDNISolone (MEDROL) 4 MG Oral Tablet Therapy Pack, As directed., Disp: 1 each, Rfl: 0    Meloxicam 15 MG Oral Tab, Take 1 tablet  (15 mg total) by mouth daily., Disp: 30 tablet, Rfl: 0    Allergies:  Allergies   Allergen Reactions    Sulfa Antibiotics RASH, HIVES and OTHER (SEE COMMENTS)     Cerner Allergy Text Annotation: sulfa drugs, Cerner Reaction: rash      Kiwi Extract ITCHING       Social History     Socioeconomic History    Marital status:      Spouse name: Not on file    Number of children: Not on file    Years of education: Not on file    Highest education level: Not on file   Occupational History    Not on file   Tobacco Use    Smoking status: Never    Smokeless tobacco: Never    Tobacco comments:     social in college    Vaping Use    Vaping status: Never Used   Substance and Sexual Activity    Alcohol use: Yes     Comment: OCASSIONAL    Drug use: Never    Sexual activity: Yes     Partners: Male     Birth control/protection: Vasectomy   Other Topics Concern    Not on file   Social History Narrative    Not on file     Social Determinants of Health     Financial Resource Strain: Not on file   Food Insecurity: Not on file   Transportation Needs: Not on file   Physical Activity: Not on file   Stress: Not on file   Social Connections: Not on file   Housing Stability: Not on file           PHYSICAL EXAM:     Vitals:    08/20/24 1520   BP: 114/72   Pulse: 86   Resp: 18   Temp: 98 °F (36.7 °C)   TempSrc: Temporal   SpO2: 98%   Weight: 131 lb (59.4 kg)   Height: 5' 5\" (1.651 m)       Body mass index is 21.8 kg/m².    Physical Exam  Constitutional:       Appearance: Normal appearance.   Eyes:      General: No scleral icterus.  Cardiovascular:      Rate and Rhythm: Normal rate and regular rhythm.      Pulses: Normal pulses.      Heart sounds: Normal heart sounds. No murmur heard.  Pulmonary:      Effort: Pulmonary effort is normal. No respiratory distress.      Breath sounds: Normal breath sounds. No stridor. No wheezing or rhonchi.   Musculoskeletal:      Lumbar back: Tenderness present. Decreased range of motion.   Neurological:       Mental Status: She is alert.      Deep Tendon Reflexes: Reflexes normal.   Psychiatric:         Mood and Affect: Mood normal.         Behavior: Behavior normal.           ASSESSMENT/PLAN:   1. Chronic bilateral low back pain without sciatica  methylPREDNISolone (MEDROL) 4 MG Oral Tablet Therapy Pack; As directed.  Dispense: 1 each; Refill: 0  PHYSICAL THERAPY - INTERNAL  Meloxicam 15 MG Oral Tab; Take 1 tablet (15 mg total) by mouth daily.  Dispense: 30 tablet; Refill: 0  Several range of motion exercises given.  Warm compresses.  Follow-up with physiatry.    2. Degeneration of lumbar or lumbosacral intervertebral disc  methylPREDNISolone (MEDROL) 4 MG Oral Tablet Therapy Pack; As directed.  Dispense: 1 each; Refill: 0  PHYSICAL THERAPY - INTERNAL  Meloxicam 15 MG Oral Tab; Take 1 tablet (15 mg total) by mouth daily.  Dispense: 30 tablet; Refill: 0  As per #1.    Return if symptoms worsen or fail to improve.    This note was prepared using Dragon Medical voice recognition dictation software. As a result errors may occur. When identified these errors have been corrected. While every attempt is made to correct errors during dictation discrepancies may still exist.    Valerio Brown MD  8/20/2024

## 2024-08-21 RX ORDER — PREDNISONE 10 MG/1
10 TABLET ORAL DAILY
Qty: 28 EACH | Refills: 0 | Status: SHIPPED | OUTPATIENT
Start: 2024-08-21

## 2024-08-22 NOTE — TELEPHONE ENCOUNTER
This is the first time that I have seen this message.  PT order placed and ok for prednisone taper, but she will need to be seen with a week since she was last seen on 11/16/2023.

## 2024-08-26 ENCOUNTER — OFFICE VISIT (OUTPATIENT)
Dept: PHYSICAL THERAPY | Facility: HOSPITAL | Age: 39
End: 2024-08-26
Attending: INTERNAL MEDICINE
Payer: COMMERCIAL

## 2024-08-26 PROCEDURE — 97110 THERAPEUTIC EXERCISES: CPT | Performed by: PHYSICAL THERAPIST

## 2024-08-26 PROCEDURE — 97162 PT EVAL MOD COMPLEX 30 MIN: CPT | Performed by: PHYSICAL THERAPIST

## 2024-08-26 PROCEDURE — 97140 MANUAL THERAPY 1/> REGIONS: CPT | Performed by: PHYSICAL THERAPIST

## 2024-08-26 NOTE — PROGRESS NOTES
LUMBAR SPINE EVALUATION:   Referring Physician: Dr. Brown  Diagnosis:  Chronic bilateral low back pain without sciatica (M54.50,G89.29)  Degeneration of lumbar or lumbosacral intervertebral disc (M51.37)     Date of Service: 2024   Date of Onset: had pain for several years, worse over the summer    PATIENT SUMMARY   Lorri Gunderson is a 39 year old y/o female who presents to therapy today with complaints of LBP and feeling of locked up..    States she has two private visits of PT.  Reports she also a chiropractor which was very helpful.       History of current condition: States she has scoliosis and had pain for several years.  Reports her pain got worse over the summer after horse back riding and getting off her normal workout routine.    Pain Ratin/10 VAS    Current functional limitations include limited ability to .   Lorri describes prior level of function independent in all activities. Pt goals include decrease pain, return to workouts.    Past medical history was reviewed with Lorri. Significant findings include   Past Medical History:    Chronic pain of both knees    Formatting of this note might be different from the original. Last Assessment & Plan: She is training for marathon, notes pain in her knees that is interfering with her progress. No associated swelling or joint instability.  Plan:  Referred for PT for further evaluation and management.    Crushing injury of toe of right foot    Esophageal reflux    Eustachian tube dysfunction, bilateral    Lumbar disc herniation    Lumbar radicular pain    Tension-type headache, not intractable    Formatting of this note might be different from the original. Last Assessment & Plan: Off and on headaches for the last 2-3 weeks, predominantly around her temples, without associated visual change, nausea, vomiting, or light sensitivity.  Minimal caffeine intake.  Some increased stress, lost a friend to cancer, going through process of possibly  having third child through surrogate.  Training for      Past Surgical History:   Procedure Laterality Date          Enlarge breast with implant      Enlarge breast with implant       .     Are you being hurt, frightened, demeaned, or taken advantage of by anyone at your home or in your life?  No     Have you recently had thoughts of hurting yourself?  No    Have you tried to hurt yourself in the past?  No      ASSESSMENT:   Lorri Gunderson presents back to therapy with reports of LBP and feelings of instability.  She denies any radicular symptoms af this point but reports significant limitations in her ability to workout, bend/lift/twist.  She displays very limited lumbar ROM, limited pelvic mobility and increased pain that limits her functional mobility.  The pt will benefit from skilled PT to return to her PLOF.  Note the patient transferred back to the hospital after private therapy secondary to insurance reasons.  Recommend skilled PT 1 time per week for up to 12 visits.  The pt is in agreement with the POC.      Precautions:  scoliosis      OBJECTIVE:   Observation/Posture: scoliosis curvature of the spine with anterior pelvic tilt  Gait: bilateral heel toe gait pattern without gross deviation  ROM:     Trunk         Pain (+/-)   Flexion  Limited 50%                       Extension Limited 25% At L4L5 and L5S1   R Sidebend Limited 25%    L Sidebend Limited 10%    R Rotation NT    L Rotation NT    R Sideglide WFL    L Sideglide WFL      Repeated Motion Testing: REIL decreased better    SANDRA Testing R L   Cervical Axial Rotation + +   Apical Expansion WFL WFL   Adduction Drop Test - +   Extension Drop Test NT NT   SLR 90 90   Trunk Rotation NT NT   Posterior Mediastinum Expansion Test WFL WFL   Standing Reach Test - -   PART + -   PADT + +   Hrusta Adduction Lift Test 3/5 3/5   Hrusta Abduction Lift Test NT NT       STRENGTH:   5/5 MMT Scale   Left  Right Comments   Hip Flexion (L2) 5 5    Knee  Extension (L3) 5 5    Knee Flexion 5 5    Ankle DF (L4) 5 4+    EHL (L5) 5 4    Ankle PF (S1) 5 4+    Hip Abduction NT NT    Hip Extension NT NT      Flexibility:      R L   Hamstrings Long  long   Piriformis short WFL       Palpation: TTP over the L5 spinous process  Neuro Screen: (-) heel walking and toe walking  Accessory Motion: decreased mobility L5S1 with PA mobs  Special Tests: (-) SLR, (-) SLump    Outcome Surverys  Oswestry Disability Index Score  Score: 8 % (11/28/2023  9:20 AM)        Today’s Treatment and Response:     8/26/2024  Visit #   1   Manual Therapy [place] PA's L4L5 and L5S1    Lumbar rocking   Therapeutic Exercise REIL    REIL with sag   Therapeutic Activity    Neuromuscular Education Left Sidelying Resisted Right Glute Max   TNE Education    HEP REIL with sag    Left Sidelying Resisted Right Glute Max       The pt was educated on the plan of care, purpose and individual goals for therapy, precautions for therapy.  All questions were answered.     Charges: PT Eval x 1, TE1 (10), Man1 (15), NM0(5)    Total Timed treatment: 45 min      Total Treatment Time: 30 min       In agreement with FOTO score and clinical rationale, this evaluation involved Moderate Complexity decision making due to 3+ personal factors/comorbidities, 4+ body structures involved/activity limitations, and unstable symptoms including changing pain levels.       PLAN OF CARE:    Goals:      The pt was educated on the plan of care, purpose and individual goals for therapy, precautions for therapy.  All questions were answered.     1.  The pt will be independent in their HEP.  2.  Centralization of symptoms to the lumbar spine.  3.  The pt will be independent in a modified workout program.  4.  The pt will be able to bend over to pick something off the floor.  5.   The pt will be able to drive for 1 hour without an increase in pain.      Frequency / Duration: Patient will be seen for 1-2 x/week or a total of 12 visits over a 90  day period.  Treatment will include: Manual Therapy; Therapeutic Exercises; Neuromuscular Re-education; Therapeutic Activity;  Patient education: Home exercise program instruction; TNE Education; Modalities as needed.    Education or treatment limitation: None  Rehab Potential good    Patient was advised of these findings, precautions, and treatment options and has agreed to actively participate in planning and for this course of care.    Thank you for your referral. Please co-sign or sign and return this letter via fax as soon as possible to [phone]. If you have any questions, please contact me at Dept: [phone]    Sincerely,  Electronically signed by therapist: MAREK JACINTO, PT    Physician's certification required: Yes  I certify the need for these services furnished under this plan of treatment and while under my care.    X___________________________________________________ Date____________________    Certification From: 8/26/2024  To:11/24/2024

## 2024-08-27 ENCOUNTER — APPOINTMENT (OUTPATIENT)
Dept: PHYSICAL THERAPY | Facility: HOSPITAL | Age: 39
End: 2024-08-27
Attending: INTERNAL MEDICINE
Payer: COMMERCIAL

## 2024-08-29 ENCOUNTER — OFFICE VISIT (OUTPATIENT)
Dept: PHYSICAL THERAPY | Facility: HOSPITAL | Age: 39
End: 2024-08-29
Attending: INTERNAL MEDICINE
Payer: COMMERCIAL

## 2024-08-29 PROCEDURE — 97140 MANUAL THERAPY 1/> REGIONS: CPT | Performed by: PHYSICAL THERAPIST

## 2024-08-29 PROCEDURE — 97530 THERAPEUTIC ACTIVITIES: CPT | Performed by: PHYSICAL THERAPIST

## 2024-08-29 NOTE — PROGRESS NOTES
2024  Dx: Chronic bilateral low back pain without sciatica (M54.50,G89.29)  Degeneration of lumbar or lumbosacral intervertebral disc (M51.37)               Authorized # of Visits:  12 visits on POC          Next MD visit: none   Fall Risk: standard         Precautions:  scoliosis  Medication Changes since last visit?: No    Subjective: States she still feels about the same.  States she feels muscle spasm in the middle of her low back. Debating if she should take more anti-inflammatories.    Pain Ratin/10 VAS    Objective:        2024  Visit #   1 2024  Visit #2   Manual Therapy Central PA's L4L5 and L5S1    Lumbar rocking Central PA's L4L5 and L5S1    Lumbar rocking    STM lumbar paraspinals    MFR lumbar paraspinals     R on R scaral rotation MET   Therapeutic Exercise REIL    REIL with sag    Therapeutic Activity  Education on bending to avoid lumbar spine strain   Neuromuscular Education Left Sidelying Resisted Right Glute Max    TNE Education     HEP REIL with sag    Left Sidelying Resisted Right Glute Max Continue current HEP       Assessment: No adverse effects to treatment.  Focused on manual therapy to decrease myospasm in the lumbar spine.  The pt reported some relief from the session.  Note R on R sacral rotation  this date which was corrected with METs.    Goals:      The pt was educated on the plan of care, purpose and individual goals for therapy, precautions for therapy.  All questions were answered.     1.  The pt will be independent in their HEP.  2.  Centralization of symptoms to the lumbar spine.  3.  The pt will be independent in a modified workout program.  4.  The pt will be able to bend over to pick something off the floor.  5.   The pt will be able to drive for 1 hour without an increase in pain.      Frequency / Duration: Patient will be seen for 1-2 x/week or a total of 12 visits over a 90 day period.  Treatment will include: Manual Therapy; Therapeutic Exercises;  Neuromuscular Re-education; Therapeutic Activity;  Patient education: Home exercise program instruction; TNE Education; Modalities as needed.    Education or treatment limitation: None  Rehab Potential good      Certification From: 2024  To:2024        Charges: Man3(38), TA1(8)       Total Timed Treatment: 46 min  Total Treatment Time: 46 min            FOR Reference ONLY   LUMBAR SPINE EVALUATION:   Referring Physician: Dr. Brown  Diagnosis:  Chronic bilateral low back pain without sciatica (M54.50,G89.29)  Degeneration of lumbar or lumbosacral intervertebral disc (M51.37)     Date of Service: 2024   Date of Onset: had pain for several years, worse over the summer    PATIENT SUMMARY   Lorri Gunderson is a 39 year old y/o female who presents to therapy today with complaints of LBP and feeling of locked up..    States she has two private visits of PT.  Reports she also a chiropractor which was very helpful.       History of current condition: States she has scoliosis and had pain for several years.  Reports her pain got worse over the summer after horse back riding and getting off her normal workout routine.    Pain Ratin/10 VAS    Current functional limitations include limited ability to .   Lorri describes prior level of function independent in all activities. Pt goals include decrease pain, return to workouts.    Past medical history was reviewed with Lorri. Significant findings include   Past Medical History:    Chronic pain of both knees    Formatting of this note might be different from the original. Last Assessment & Plan: She is training for marathon, notes pain in her knees that is interfering with her progress. No associated swelling or joint instability.  Plan:  Referred for PT for further evaluation and management.    Crushing injury of toe of right foot    Esophageal reflux    Eustachian tube dysfunction, bilateral    Lumbar disc herniation    Lumbar radicular pain     Tension-type headache, not intractable    Formatting of this note might be different from the original. Last Assessment & Plan: Off and on headaches for the last 2-3 weeks, predominantly around her temples, without associated visual change, nausea, vomiting, or light sensitivity.  Minimal caffeine intake.  Some increased stress, lost a friend to cancer, going through process of possibly having third child through surrogate.  Training for      Past Surgical History:   Procedure Laterality Date          Enlarge breast with implant      Enlarge breast with implant       .     Are you being hurt, frightened, demeaned, or taken advantage of by anyone at your home or in your life?  No     Have you recently had thoughts of hurting yourself?  No    Have you tried to hurt yourself in the past?  No      ASSESSMENT:   Lorri Gunderson presents back to therapy with reports of LBP and feelings of instability.  She denies any radicular symptoms af this point but reports significant limitations in her ability to workout, bend/lift/twist.  She displays very limited lumbar ROM, limited pelvic mobility and increased pain that limits her functional mobility.  The pt will benefit from skilled PT to return to her PLOF.  Note the patient transferred back to the hospital after private therapy secondary to insurance reasons.  Recommend skilled PT 1 time per week for up to 12 visits.  The pt is in agreement with the POC.      Precautions:  scoliosis      OBJECTIVE:   Observation/Posture: scoliosis curvature of the spine with anterior pelvic tilt  Gait: bilateral heel toe gait pattern without gross deviation  ROM:     Trunk         Pain (+/-)   Flexion  Limited 50%                       Extension Limited 25% At L4L5 and L5S1   R Sidebend Limited 25%    L Sidebend Limited 10%    R Rotation NT    L Rotation NT    R Sideglide WFL    L Sideglide WFL      Repeated Motion Testing: REIL decreased better    SANDRA Testing R L    Cervical Axial Rotation + +   Apical Expansion WFL WFL   Adduction Drop Test - +   Extension Drop Test NT NT   SLR 90 90   Trunk Rotation NT NT   Posterior Mediastinum Expansion Test WFL WFL   Standing Reach Test - -   PART + -   PADT + +   Hrusta Adduction Lift Test 3/5 3/5   Hrusta Abduction Lift Test NT NT       STRENGTH:   5/5 MMT Scale   Left  Right Comments   Hip Flexion (L2) 5 5    Knee Extension (L3) 5 5    Knee Flexion 5 5    Ankle DF (L4) 5 4+    EHL (L5) 5 4    Ankle PF (S1) 5 4+    Hip Abduction NT NT    Hip Extension NT NT      Flexibility:      R L   Hamstrings Long  long   Piriformis short WFL       Palpation: TTP over the L5 spinous process  Neuro Screen: (-) heel walking and toe walking  Accessory Motion: decreased mobility L5S1 with PA mobs  Special Tests: (-) SLR, (-) SLump    Outcome Surverys  Oswestry Disability Index Score  Score: 8 % (11/28/2023  9:20 AM)

## 2024-09-03 ENCOUNTER — OFFICE VISIT (OUTPATIENT)
Dept: PHYSICAL THERAPY | Facility: HOSPITAL | Age: 39
End: 2024-09-03
Attending: INTERNAL MEDICINE
Payer: COMMERCIAL

## 2024-09-03 PROCEDURE — 97140 MANUAL THERAPY 1/> REGIONS: CPT | Performed by: PHYSICAL THERAPIST

## 2024-09-03 PROCEDURE — 97112 NEUROMUSCULAR REEDUCATION: CPT | Performed by: PHYSICAL THERAPIST

## 2024-09-03 NOTE — PROGRESS NOTES
9/3/2024    Dx: Chronic bilateral low back pain without sciatica (M54.50,G89.29)  Degeneration of lumbar or lumbosacral intervertebral disc (M51.37)               Authorized # of Visits:  12 visits on POC          Next MD visit: none   Fall Risk: standard         Precautions:  scoliosis  Medication Changes since last visit?: No    Subjective: Reports she better after the STM at the last visit.  State her back doesn't feel as tight and sore.    Pain Rating:  3/10 VAS    Objective:        8/26/2024  Visit #   1 8/29/2024  Visit #2 9/3/2024  Visit #3   Manual Therapy Central PA's L4L5 and L5S1    Lumbar rocking Central PA's L4L5 and L5S1    Lumbar rocking    STM lumbar paraspinals    MFR lumbar paraspinals     R on R scaral rotation MET Central PA's L4L5 and L5S1    Lumbar rocking    STM lumbar paraspinals    MFR lumbar paraspinals     R on R scaral rotation MET   Therapeutic Exercise REIL    REIL with sag     Therapeutic Activity  Education on bending to avoid lumbar spine strain    Neuromuscular Education Left Sidelying Resisted Right Glute Max  Paraspinal release with L hamstring    L adductor pull backs   TNE Education      HEP REIL with sag    Left Sidelying Resisted Right Glute Max Continue current HEP Paraspinal release with L hamstring    L adductor pull backs       Assessment: No adverse effects to treatment.  Continued manual therapy secondary to relief at the last visit.  Added L hamstring and L adductor pull backs this date.  The pt has been progressing back to exercise classes.    Goals:      The pt was educated on the plan of care, purpose and individual goals for therapy, precautions for therapy.  All questions were answered.     1.  The pt will be independent in their HEP.  2.  Centralization of symptoms to the lumbar spine.  3.  The pt will be independent in a modified workout program.  4.  The pt will be able to bend over to pick something off the floor.  5.   The pt will be able to drive for 1 hour  without an increase in pain.      Frequency / Duration: Patient will be seen for 1-2 x/week or a total of 12 visits over a 90 day period.  Treatment will include: Manual Therapy; Therapeutic Exercises; Neuromuscular Re-education; Therapeutic Activity;  Patient education: Home exercise program instruction; TNE Education; Modalities as needed.    Education or treatment limitation: None  Rehab Potential good      Certification From: 2024  To:2024        Charges: Man2 (23), NM2(23)     Total Timed Treatment: 46 min  Total Treatment Time: 46 min            FOR Reference ONLY   LUMBAR SPINE EVALUATION:   Referring Physician: Dr. Brown  Diagnosis:  Chronic bilateral low back pain without sciatica (M54.50,G89.29)  Degeneration of lumbar or lumbosacral intervertebral disc (M51.37)     Date of Service: 2024   Date of Onset: had pain for several years, worse over the summer    PATIENT SUMMARY   Lorri Gunderson is a 39 year old y/o female who presents to therapy today with complaints of LBP and feeling of locked up..    States she has two private visits of PT.  Reports she also a chiropractor which was very helpful.       History of current condition: States she has scoliosis and had pain for several years.  Reports her pain got worse over the summer after horse back riding and getting off her normal workout routine.    Pain Ratin/10 VAS    Current functional limitations include limited ability to .   Lorri describes prior level of function independent in all activities. Pt goals include decrease pain, return to workouts.    Past medical history was reviewed with Lorri. Significant findings include   Past Medical History:    Chronic pain of both knees    Formatting of this note might be different from the original. Last Assessment & Plan: She is training for marathon, notes pain in her knees that is interfering with her progress. No associated swelling or joint instability.  Plan:  Referred for PT  for further evaluation and management.    Crushing injury of toe of right foot    Esophageal reflux    Eustachian tube dysfunction, bilateral    Lumbar disc herniation    Lumbar radicular pain    Tension-type headache, not intractable    Formatting of this note might be different from the original. Last Assessment & Plan: Off and on headaches for the last 2-3 weeks, predominantly around her temples, without associated visual change, nausea, vomiting, or light sensitivity.  Minimal caffeine intake.  Some increased stress, lost a friend to cancer, going through process of possibly having third child through surrogate.  Training for      Past Surgical History:   Procedure Laterality Date          Enlarge breast with implant      Enlarge breast with implant       .     Are you being hurt, frightened, demeaned, or taken advantage of by anyone at your home or in your life?  No     Have you recently had thoughts of hurting yourself?  No    Have you tried to hurt yourself in the past?  No      ASSESSMENT:   Lorri Gunderson presents back to therapy with reports of LBP and feelings of instability.  She denies any radicular symptoms af this point but reports significant limitations in her ability to workout, bend/lift/twist.  She displays very limited lumbar ROM, limited pelvic mobility and increased pain that limits her functional mobility.  The pt will benefit from skilled PT to return to her PLOF.  Note the patient transferred back to the hospital after private therapy secondary to insurance reasons.  Recommend skilled PT 1 time per week for up to 12 visits.  The pt is in agreement with the POC.      Precautions:  scoliosis      OBJECTIVE:   Observation/Posture: scoliosis curvature of the spine with anterior pelvic tilt  Gait: bilateral heel toe gait pattern without gross deviation  ROM:     Trunk         Pain (+/-)   Flexion  Limited 50%                       Extension Limited 25% At L4L5 and L5S1   R  Sidebend Limited 25%    L Sidebend Limited 10%    R Rotation NT    L Rotation NT    R Sideglide WFL    L Sideglide WFL      Repeated Motion Testing: REIL decreased better    SANDRA Testing R L   Cervical Axial Rotation + +   Apical Expansion WFL WFL   Adduction Drop Test - +   Extension Drop Test NT NT   SLR 90 90   Trunk Rotation NT NT   Posterior Mediastinum Expansion Test WFL WFL   Standing Reach Test - -   PART + -   PADT + +   Hrusta Adduction Lift Test 3/5 3/5   Hrusta Abduction Lift Test NT NT       STRENGTH:   5/5 MMT Scale   Left  Right Comments   Hip Flexion (L2) 5 5    Knee Extension (L3) 5 5    Knee Flexion 5 5    Ankle DF (L4) 5 4+    EHL (L5) 5 4    Ankle PF (S1) 5 4+    Hip Abduction NT NT    Hip Extension NT NT      Flexibility:      R L   Hamstrings Long  long   Piriformis short WFL       Palpation: TTP over the L5 spinous process  Neuro Screen: (-) heel walking and toe walking  Accessory Motion: decreased mobility L5S1 with PA mobs  Special Tests: (-) SLR, (-) SLump    Outcome Surverys  Oswestry Disability Index Score  Score: 8 % (11/28/2023  9:20 AM)

## 2024-09-09 ENCOUNTER — MED REC SCAN ONLY (OUTPATIENT)
Dept: PHYSICAL MEDICINE AND REHAB | Facility: CLINIC | Age: 39
End: 2024-09-09

## 2024-09-16 ENCOUNTER — OFFICE VISIT (OUTPATIENT)
Dept: PHYSICAL THERAPY | Facility: HOSPITAL | Age: 39
End: 2024-09-16
Attending: INTERNAL MEDICINE
Payer: COMMERCIAL

## 2024-09-16 PROCEDURE — 97140 MANUAL THERAPY 1/> REGIONS: CPT | Performed by: PHYSICAL THERAPIST

## 2024-09-16 PROCEDURE — 97112 NEUROMUSCULAR REEDUCATION: CPT | Performed by: PHYSICAL THERAPIST

## 2024-09-17 NOTE — PROGRESS NOTES
9/16/2024    Dx: Chronic bilateral low back pain without sciatica (M54.50,G89.29)  Degeneration of lumbar or lumbosacral intervertebral disc (M51.37)               Authorized # of Visits:  12 visits on POC          Next MD visit: none   Fall Risk: standard         Precautions:  scoliosis  Medication Changes since last visit?: No    Subjective: Reports she is feeling better overall. Reports she started a new workout and states it is a lot of heavy lifting but her back felt pretty good afterwards.    Pain Rating:  3/10 VAS    Objective:        8/26/2024  Visit #   1 8/29/2024  Visit #2 9/3/2024  Visit #3   Manual Therapy Central PA's L4L5 and L5S1    Lumbar rocking Central PA's L4L5 and L5S1    Lumbar rocking    STM lumbar paraspinals    MFR lumbar paraspinals     R on R scaral rotation MET Central PA's L4L5 and L5S1    Lumbar rocking    STM lumbar paraspinals    MFR lumbar paraspinals     R on R scaral rotation MET   Therapeutic Exercise REIL    REIL with sag     Therapeutic Activity  Education on bending to avoid lumbar spine strain    Neuromuscular Education Left Sidelying Resisted Right Glute Max  Paraspinal release with L hamstring    L adductor pull backs   TNE Education      HEP REIL with sag    Left Sidelying Resisted Right Glute Max Continue current HEP Paraspinal release with L hamstring    L adductor pull backs       Assessment: No adverse effects to treatment.  Continued manual therapy secondary to relief at the last visit.  Added L hamstring and L adductor pull backs this date and recommend she does those exercises before and after exercise. The pt has been progressing back to exercise classes.    Goals:      The pt was educated on the plan of care, purpose and individual goals for therapy, precautions for therapy.  All questions were answered.     1.  The pt will be independent in their HEP.  2.  Centralization of symptoms to the lumbar spine.  3.  The pt will be independent in a modified workout  program.  4.  The pt will be able to bend over to pick something off the floor.  5.   The pt will be able to drive for 1 hour without an increase in pain.      Frequency / Duration: Patient will be seen for 1-2 x/week or a total of 12 visits over a 90 day period.  Treatment will include: Manual Therapy; Therapeutic Exercises; Neuromuscular Re-education; Therapeutic Activity;  Patient education: Home exercise program instruction; TNE Education; Modalities as needed.    Education or treatment limitation: None  Rehab Potential good      Certification From: 2024  To:2024        Charges: Man2 (23), NM2(23)     Total Timed Treatment: 46 min  Total Treatment Time: 46 min            FOR Reference ONLY   LUMBAR SPINE EVALUATION:   Referring Physician: Dr. Brown  Diagnosis:  Chronic bilateral low back pain without sciatica (M54.50,G89.29)  Degeneration of lumbar or lumbosacral intervertebral disc (M51.37)     Date of Service: 2024   Date of Onset: had pain for several years, worse over the summer    PATIENT SUMMARY   Lorri Gunderson is a 39 year old y/o female who presents to therapy today with complaints of LBP and feeling of locked up..    States she has two private visits of PT.  Reports she also a chiropractor which was very helpful.       History of current condition: States she has scoliosis and had pain for several years.  Reports her pain got worse over the summer after horse back riding and getting off her normal workout routine.    Pain Ratin/10 VAS    Current functional limitations include limited ability to .   Lorri describes prior level of function independent in all activities. Pt goals include decrease pain, return to workouts.    Past medical history was reviewed with Lorri. Significant findings include   Past Medical History:    Chronic pain of both knees    Formatting of this note might be different from the original. Last Assessment & Plan: She is training for marathon, notes  pain in her knees that is interfering with her progress. No associated swelling or joint instability.  Plan:  Referred for PT for further evaluation and management.    Crushing injury of toe of right foot    Esophageal reflux    Eustachian tube dysfunction, bilateral    Lumbar disc herniation    Lumbar radicular pain    Tension-type headache, not intractable    Formatting of this note might be different from the original. Last Assessment & Plan: Off and on headaches for the last 2-3 weeks, predominantly around her temples, without associated visual change, nausea, vomiting, or light sensitivity.  Minimal caffeine intake.  Some increased stress, lost a friend to cancer, going through process of possibly having third child through surrogate.  Training for      Past Surgical History:   Procedure Laterality Date          Enlarge breast with implant      Enlarge breast with implant       .     Are you being hurt, frightened, demeaned, or taken advantage of by anyone at your home or in your life?  No     Have you recently had thoughts of hurting yourself?  No    Have you tried to hurt yourself in the past?  No      ASSESSMENT:   Lorri Gunderson presents back to therapy with reports of LBP and feelings of instability.  She denies any radicular symptoms af this point but reports significant limitations in her ability to workout, bend/lift/twist.  She displays very limited lumbar ROM, limited pelvic mobility and increased pain that limits her functional mobility.  The pt will benefit from skilled PT to return to her PLOF.  Note the patient transferred back to the hospital after private therapy secondary to insurance reasons.  Recommend skilled PT 1 time per week for up to 12 visits.  The pt is in agreement with the POC.      Precautions:  scoliosis      OBJECTIVE:   Observation/Posture: scoliosis curvature of the spine with anterior pelvic tilt  Gait: bilateral heel toe gait pattern without gross  deviation  ROM:     Trunk         Pain (+/-)   Flexion  Limited 50%                       Extension Limited 25% At L4L5 and L5S1   R Sidebend Limited 25%    L Sidebend Limited 10%    R Rotation NT    L Rotation NT    R Sideglide WFL    L Sideglide WFL      Repeated Motion Testing: REIL decreased better    SANDRA Testing R L   Cervical Axial Rotation + +   Apical Expansion WFL WFL   Adduction Drop Test - +   Extension Drop Test NT NT   SLR 90 90   Trunk Rotation NT NT   Posterior Mediastinum Expansion Test WFL WFL   Standing Reach Test - -   PART + -   PADT + +   Hrusta Adduction Lift Test 3/5 3/5   Hrusta Abduction Lift Test NT NT       STRENGTH:   5/5 MMT Scale   Left  Right Comments   Hip Flexion (L2) 5 5    Knee Extension (L3) 5 5    Knee Flexion 5 5    Ankle DF (L4) 5 4+    EHL (L5) 5 4    Ankle PF (S1) 5 4+    Hip Abduction NT NT    Hip Extension NT NT      Flexibility:      R L   Hamstrings Long  long   Piriformis short WFL       Palpation: TTP over the L5 spinous process  Neuro Screen: (-) heel walking and toe walking  Accessory Motion: decreased mobility L5S1 with PA mobs  Special Tests: (-) SLR, (-) SLump    Outcome Surverys  Oswestry Disability Index Score  Score: 8 % (11/28/2023  9:20 AM)

## 2024-09-24 ENCOUNTER — OFFICE VISIT (OUTPATIENT)
Dept: PHYSICAL THERAPY | Facility: HOSPITAL | Age: 39
End: 2024-09-24
Attending: INTERNAL MEDICINE
Payer: COMMERCIAL

## 2024-09-24 DIAGNOSIS — M51.379 DEGENERATION OF LUMBAR OR LUMBOSACRAL INTERVERTEBRAL DISC: ICD-10-CM

## 2024-09-24 DIAGNOSIS — G89.29 CHRONIC BILATERAL LOW BACK PAIN WITHOUT SCIATICA: ICD-10-CM

## 2024-09-24 DIAGNOSIS — M54.50 CHRONIC BILATERAL LOW BACK PAIN WITHOUT SCIATICA: ICD-10-CM

## 2024-09-24 PROCEDURE — 97140 MANUAL THERAPY 1/> REGIONS: CPT | Performed by: PHYSICAL THERAPIST

## 2024-09-24 NOTE — PROGRESS NOTES
9/24/2024    Dx: Chronic bilateral low back pain without sciatica (M54.50,G89.29)  Degeneration of lumbar or lumbosacral intervertebral disc (M51.37)               Authorized # of Visits:  12 visits on POC          Next MD visit: none   Fall Risk: standard         Precautions:  scoliosis  Medication Changes since last visit?: No    Subjective: Reports she was out of town and went horseback riding and she is sore from that.  States she did some strength training and felt more neck pain.  Reports overall she is better since starting PT but feels she aggrevated her pain with the horseback riding. Feels the manual therapy helps the most.    Pain Rating:  3/10 VAS    Objective:        8/26/2024  Visit #   1 8/29/2024  Visit #2 9/3/2024  Visit #3 9/24/2024  Visit #4   Manual Therapy Central PA's L4L5 and L5S1    Lumbar rocking Central PA's L4L5 and L5S1    Lumbar rocking    STM lumbar paraspinals    MFR lumbar paraspinals     R on R scaral rotation MET Central PA's L4L5 and L5S1    Lumbar rocking    STM lumbar paraspinals    MFR lumbar paraspinals     R on R scaral rotation MET Central PA's L4L5 and L5S1    Lumbar rocking    STM lumbar paraspinals    MFR lumbar paraspinals     R on R scaral rotation MET   Therapeutic Exercise REIL    REIL with sag      Therapeutic Activity  Education on bending to avoid lumbar spine strain     Neuromuscular Education Left Sidelying Resisted Right Glute Max  Paraspinal release with L hamstring    L adductor pull backs    TNE Education       HEP REIL with sag    Left Sidelying Resisted Right Glute Max Continue current HEP Paraspinal release with L hamstring    L adductor pull backs Continue with current HEP       Assessment: No adverse effects to treatment.  The pt reported increased symptoms from horse back riding.  She was TTP of the QL's and displays decreased mobility at the L54L5 and L5S1 segments.  Advised to resume her current HEP.  Will re-assess for SIJ alignment and SANDRA testing  next visit.    Goals:      The pt was educated on the plan of care, purpose and individual goals for therapy, precautions for therapy.  All questions were answered.     1.  The pt will be independent in their HEP.  2.  Centralization of symptoms to the lumbar spine.  3.  The pt will be independent in a modified workout program.  4.  The pt will be able to bend over to pick something off the floor.  5.   The pt will be able to drive for 1 hour without an increase in pain.      Frequency / Duration: Patient will be seen for 1-2 x/week or a total of 12 visits over a 90 day period.  Treatment will include: Manual Therapy; Therapeutic Exercises; Neuromuscular Re-education; Therapeutic Activity;  Patient education: Home exercise program instruction; TNE Education; Modalities as needed.    Education or treatment limitation: None  Rehab Potential good      Certification From: 2024  To:2024        Charges: Man3 (38)    Total Timed Treatment: 38 min  Total Treatment Time: 38 min            FOR Reference ONLY   LUMBAR SPINE EVALUATION:   Referring Physician: Dr. Brown  Diagnosis:  Chronic bilateral low back pain without sciatica (M54.50,G89.29)  Degeneration of lumbar or lumbosacral intervertebral disc (M51.37)     Date of Service: 2024   Date of Onset: had pain for several years, worse over the summer    PATIENT SUMMARY   Lorri Gunderson is a 39 year old y/o female who presents to therapy today with complaints of LBP and feeling of locked up..    States she has two private visits of PT.  Reports she also a chiropractor which was very helpful.       History of current condition: States she has scoliosis and had pain for several years.  Reports her pain got worse over the summer after horse back riding and getting off her normal workout routine.    Pain Ratin/10 VAS    Current functional limitations include limited ability to .   Lorri describes prior level of function independent in all  activities. Pt goals include decrease pain, return to workouts.    Past medical history was reviewed with Lorri. Significant findings include   Past Medical History:    Chronic pain of both knees    Formatting of this note might be different from the original. Last Assessment & Plan: She is training for marathon, notes pain in her knees that is interfering with her progress. No associated swelling or joint instability.  Plan:  Referred for PT for further evaluation and management.    Crushing injury of toe of right foot    Esophageal reflux    Eustachian tube dysfunction, bilateral    Lumbar disc herniation    Lumbar radicular pain    Tension-type headache, not intractable    Formatting of this note might be different from the original. Last Assessment & Plan: Off and on headaches for the last 2-3 weeks, predominantly around her temples, without associated visual change, nausea, vomiting, or light sensitivity.  Minimal caffeine intake.  Some increased stress, lost a friend to cancer, going through process of possibly having third child through surrogate.  Training for      Past Surgical History:   Procedure Laterality Date          Enlarge breast with implant      Enlarge breast with implant       .     Are you being hurt, frightened, demeaned, or taken advantage of by anyone at your home or in your life?  No     Have you recently had thoughts of hurting yourself?  No    Have you tried to hurt yourself in the past?  No      ASSESSMENT:   Lorri Gunderson presents back to therapy with reports of LBP and feelings of instability.  She denies any radicular symptoms af this point but reports significant limitations in her ability to workout, bend/lift/twist.  She displays very limited lumbar ROM, limited pelvic mobility and increased pain that limits her functional mobility.  The pt will benefit from skilled PT to return to her PLOF.  Note the patient transferred back to the hospital after private  therapy secondary to insurance reasons.  Recommend skilled PT 1 time per week for up to 12 visits.  The pt is in agreement with the POC.      Precautions:  scoliosis      OBJECTIVE:   Observation/Posture: scoliosis curvature of the spine with anterior pelvic tilt  Gait: bilateral heel toe gait pattern without gross deviation  ROM:     Trunk         Pain (+/-)   Flexion  Limited 50%                       Extension Limited 25% At L4L5 and L5S1   R Sidebend Limited 25%    L Sidebend Limited 10%    R Rotation NT    L Rotation NT    R Sideglide WFL    L Sideglide WFL      Repeated Motion Testing: REIL decreased better    SANDRA Testing R L   Cervical Axial Rotation + +   Apical Expansion WFL WFL   Adduction Drop Test - +   Extension Drop Test NT NT   SLR 90 90   Trunk Rotation NT NT   Posterior Mediastinum Expansion Test WFL WFL   Standing Reach Test - -   PART + -   PADT + +   Hrusta Adduction Lift Test 3/5 3/5   Hrusta Abduction Lift Test NT NT       STRENGTH:   5/5 MMT Scale   Left  Right Comments   Hip Flexion (L2) 5 5    Knee Extension (L3) 5 5    Knee Flexion 5 5    Ankle DF (L4) 5 4+    EHL (L5) 5 4    Ankle PF (S1) 5 4+    Hip Abduction NT NT    Hip Extension NT NT      Flexibility:      R L   Hamstrings Long  long   Piriformis short WFL       Palpation: TTP over the L5 spinous process  Neuro Screen: (-) heel walking and toe walking  Accessory Motion: decreased mobility L5S1 with PA mobs  Special Tests: (-) SLR, (-) SLump    Outcome Surverys  Oswestry Disability Index Score  Score: 8 % (11/28/2023  9:20 AM)

## 2024-09-29 RX ORDER — MELOXICAM 15 MG/1
15 TABLET ORAL DAILY
Qty: 30 TABLET | Refills: 0 | Status: SHIPPED | OUTPATIENT
Start: 2024-09-29

## 2024-09-29 NOTE — TELEPHONE ENCOUNTER
MELOXICAM 15 MG TABLET          Will file in chart as: MELOXICAM 15 MG Oral Tab    Sig: Take 1 tablet (15 mg total) by mouth daily.    Disp: 30 tablet    Refills: 0 (Pharmacy requested: Not specified)    Start: 9/24/2024    Class: Normal    Non-formulary For: Chronic bilateral low back pain without sciatica, Degeneration of lumbar or lumbosacral intervertebral disc    Last ordered: 1 month ago (8/20/2024) by Valerio Brown MD    Last refill: 8/20/2024    Rx #: [id number]    Non-Narcotic Pain Medication Protocol [id number]/24/2024 12:06 AM   Protocol Details In person appointment or virtual visit in the past 6 mos or appointment in next 3 mos      LOV 8/20/24  2. Degeneration of lumbar or lumbosacral intervertebral disc  methylPREDNISolone (MEDROL) 4 MG Oral Tablet Therapy Pack; As directed.  Dispense: 1 each; Refill: 0  PHYSICAL THERAPY - INTERNAL  Meloxicam 15 MG Oral Tab; Take 1 tablet (15 mg total) by mouth daily.  Dispense: 30 tablet; Refill: 0  As per #1.     Filled 8/20/24 30 tabs 0 refills  Future Appointments   Date Time Provider Department Center   9/30/2024  8:00 AM Triston Aguilar MD [facility]   10/1/2024 12:45 PM Nisreen Kaye, PT [facility]   10/8/2024 12:45 PM Nisreen Kaye, PT [facility]   10/15/2024 10:15 AM Nisreen Kaye PT [facility]   10/22/2024  1:00 PM Nisreen Kaye, PT [facility]

## 2024-09-30 ENCOUNTER — OFFICE VISIT (OUTPATIENT)
Dept: PHYSICAL MEDICINE AND REHAB | Facility: CLINIC | Age: 39
End: 2024-09-30
Payer: COMMERCIAL

## 2024-09-30 VITALS — BODY MASS INDEX: 21.83 KG/M2 | HEIGHT: 65 IN | WEIGHT: 131 LBS

## 2024-09-30 DIAGNOSIS — F41.9 ANXIETY: ICD-10-CM

## 2024-09-30 DIAGNOSIS — M51.9 LUMBAR DISC DISEASE: ICD-10-CM

## 2024-09-30 DIAGNOSIS — M48.061 LUMBAR FORAMINAL STENOSIS: ICD-10-CM

## 2024-09-30 DIAGNOSIS — M51.26 LUMBAR HERNIATED DISC: Primary | ICD-10-CM

## 2024-09-30 DIAGNOSIS — N85.6 ASHERMAN'S SYNDROME: ICD-10-CM

## 2024-09-30 DIAGNOSIS — M54.16 LUMBAR RADICULOPATHY: ICD-10-CM

## 2024-09-30 DIAGNOSIS — F90.0 ATTENTION DEFICIT HYPERACTIVITY DISORDER (ADHD), PREDOMINANTLY INATTENTIVE TYPE: ICD-10-CM

## 2024-09-30 PROCEDURE — 3008F BODY MASS INDEX DOCD: CPT | Performed by: PHYSICAL MEDICINE & REHABILITATION

## 2024-09-30 PROCEDURE — 99214 OFFICE O/P EST MOD 30 MIN: CPT | Performed by: PHYSICAL MEDICINE & REHABILITATION

## 2024-09-30 NOTE — PROGRESS NOTES
Low Back Pain [facility] & [facility]    Chief Complaint:   Chief Complaint   Patient presents with    Follow - Up     LOV 11/16/23 pt is here for a follow up on back pain. States the flare up started back in the summer. Reports to having aching pain when doing certain activity.  Not taking any meds or muscle relaxer's. Currently in physical therapy. Pain 2/10     Nursing note reviewed and verified.    Patient was last seen on 11/16/2023.  She went to the PT with Nisreen which really helped.  She finished the PT and slowly over time, she stopped doing the HEP and did more activities and the pain returned.  She has been back to seeing Nisreen for the last 6 weeks and this time the PT is not helping her.  She has started to work out and is lifting heavier weights with Nisreen's direction.  This has not changed her pain.  She feels that she is getting stronger.  Her upper trapezius muscles are tight.    Description of the Pain  The pain is located in the right > left low back.    The pain does not radiate.  If the pain is bad, she will get posterior leg pain, mostly on the right side..  The pain at its best is 2/10. The pain at its worst is 6/10. The pain is currently  2/10.  The pain is described as a(n) aching and sharp sensation.    The pain is worse when bending, sitting, going from sitting to standing, and in the morning and evening.    The pain is better  with exercises (Yisel  HEP  helps but is not sustained) .  There is no numbness.  There is no tingling in the legs.  There is not weakness in both legs.     Past Medical History   Past Medical History:    Chronic pain of both knees    Formatting of this note might be different from the original. Last Assessment & Plan: She is training for marathon, notes pain in her knees that is interfering with her progress. No associated swelling or joint instability.  Plan:  Referred for PT for further evaluation and management.    Crushing injury of toe of right foot    Esophageal reflux     Eustachian tube dysfunction, bilateral    Lumbar disc herniation    Lumbar radicular pain    Tension-type headache, not intractable    Formatting of this note might be different from the original. Last Assessment & Plan: Off and on headaches for the last 2-3 weeks, predominantly around her temples, without associated visual change, nausea, vomiting, or light sensitivity.  Minimal caffeine intake.  Some increased stress, lost a friend to cancer, going through process of possibly having third child through surrogate.  Training for        Past Surgical History   Past Surgical History:   Procedure Laterality Date          Enlarge breast with implant      Enlarge breast with implant         Family History   History reviewed. No pertinent family history.    Social History   Social History     Socioeconomic History    Marital status:      Spouse name: Not on file    Number of children: Not on file    Years of education: Not on file    Highest education level: Not on file   Occupational History    Not on file   Tobacco Use    Smoking status: Never    Smokeless tobacco: Never    Tobacco comments:     social in college    Vaping Use    Vaping status: Never Used   Substance and Sexual Activity    Alcohol use: Yes     Comment: OCASSIONAL    Drug use: Never    Sexual activity: Yes     Partners: Male     Birth control/protection: Vasectomy   Other Topics Concern    Not on file   Social History Narrative    Not on file     Social Determinants of Health     Financial Resource Strain: Not on file   Food Insecurity: Not on file   Transportation Needs: Not on file   Physical Activity: Not on file   Stress: Not on file   Social Connections: Not on file   Housing Stability: Not on file       PE:  The patient does appear in her stated age in no distress.  The patient is well groomed.    Psychiatric:  The patient is alert and oriented x 3.  The patient has a normal affect and mood.      Respiratory:  No acute  respiratory distress. Patient does not have a cough.    HEENT:  Extraocular muscles are intact. There is no kern icterus. Pupils are equal, round, and reactive to light. No redness or discharge bilaterally.    Skin:  There are no rashes or lesions.    Vitals:  There were no vitals filed for this visit.    Gait:    Gait: Normal gait   Sit to Stand: no difficulty    RIGHT Walking on Toes: no difficulty   LEFT Walking on Toes: no difficulty   RIGHT Walking on Heels: no difficulty   LEFT Walking on Heels: no difficulty     Lumbar Spine:    Scoliosis: Thoracic dextroscoliosis that is mild and Lumbar levoscoliosis that is mild     Lumbar Spine Palpation:    Spinous Processes: Non-tender for all Spinous Processes   Z-joints: Non-tender for all Z-joints   SIJ: Non-tender for bilateral SIJ   Piriformis Muscle: Non-tender bilateral Piriformis muscles   Greater Trochanteric Bursa: Non-tender for bilateral Greater Trochanteric Bursa     Vascular lower extremity:   Dorsalis pedis pulse-RIGHT 2+   Dorsalis pedis pulse-LEFT 2+   Tibialis posterior pulse-RIGHT 2+   Tibialis posterior pulse-LEFT 2+     Neurological Lower Extremity:    Light Touch Sensation: Intact in bilateral Lower Extremities   LE Muscle Strength: All LE strength measurements 5/5 except:  Hamstring RIGHT:   4+/5   RIGHT plantar reflexes: downward response   LEFT plantar reflexes: downward response   Reflexes: 2+ in bilateral lower extremities     Assessment  1. L4-5 right mild herniated disc    2. L5-S1 left mod foraminal & mild-mod diffuse, L4-5 right foraminal & right lateral recess mild-mod, L3-4 mild central bulging discs    3. L5-S1 left mild-mod/right mild, L4-5 right mild foraminal stenosis    4. Anxiety    5. Attention deficit hyperactivity disorder (ADHD), predominantly inattentive type    6. Asherman's syndrome    7. right L5-S1 radiculopathy         Plan  She will continue with the PT with Nisreen and her home exercise program for now.    We spoke about  her having a right L5 TFESI, but she has decided to hold on it for now.    I told her that she will need to hold off on horse back riding until the Spring.    She will follow up in January or sooner if needed.    The patient understands and agrees with the stated plan.  Triston Aguilar MD  9/30/2024

## 2024-10-01 ENCOUNTER — OFFICE VISIT (OUTPATIENT)
Dept: PHYSICAL THERAPY | Facility: HOSPITAL | Age: 39
End: 2024-10-01
Attending: INTERNAL MEDICINE
Payer: COMMERCIAL

## 2024-10-01 PROCEDURE — 97140 MANUAL THERAPY 1/> REGIONS: CPT | Performed by: PHYSICAL THERAPIST

## 2024-10-01 NOTE — PROGRESS NOTES
10/1/2024    Dx: Chronic bilateral low back pain without sciatica (M54.50,G89.29)  Degeneration of lumbar or lumbosacral intervertebral disc (M51.37)               Authorized # of Visits:  12 visits on POC          Next MD visit: none   Fall Risk: standard         Precautions:  scoliosis  Medication Changes since last visit?: No    Subjective: Reports she saw Dr. Aguilar.  Told to avoid riding horses.  Reports the manual therapy helps the most in the past but doesn't feel like her symptoms are resolving as quickly as they did during her last episode of care.  States she was offered an injection by Dr. Aguilar and has a Medrol Dose Pack.    Pain Rating:  3/10 VAS    Objective:        9/3/2024  Visit #3 9/24/2024  Visit #4 10/1/2024  Visit #5   Manual Therapy Central PA's L4L5 and L5S1    Lumbar rocking    STM lumbar paraspinals    MFR lumbar paraspinals     R on R scaral rotation MET Central PA's L4L5 and L5S1    Lumbar rocking    STM lumbar paraspinals    MFR lumbar paraspinals     R on R scaral rotation MET Central PA's L4L5 and L5S1    Lumbar rocking    STM lumbar paraspinals    MFR lumbar paraspinals      Therapeutic Exercise      Therapeutic Activity      Neuromuscular Education Paraspinal release with L hamstring    L adductor pull backs     TNE Education      HEP Paraspinal release with L hamstring    L adductor pull backs Continue with current HEP Continue with current HEP       Assessment: No adverse effects to treatment.  Continued manual therapy this date, note TTP over the L4L5 and L5S1 segments.  Pain did increased after horse back riding while out of town.  Will re-assess pelvic alignment and SANDRA testing next visit to see if her alignment is aggravating her symptoms.      Goals:      The pt was educated on the plan of care, purpose and individual goals for therapy, precautions for therapy.  All questions were answered.     1.  The pt will be independent in their HEP.  2.  Centralization of symptoms to the  lumbar spine.  3.  The pt will be independent in a modified workout program.  4.  The pt will be able to bend over to pick something off the floor.  5.   The pt will be able to drive for 1 hour without an increase in pain.      Frequency / Duration: Patient will be seen for 1-2 x/week or a total of 12 visits over a 90 day period.  Treatment will include: Manual Therapy; Therapeutic Exercises; Neuromuscular Re-education; Therapeutic Activity;  Patient education: Home exercise program instruction; TNE Education; Modalities as needed.    Education or treatment limitation: None  Rehab Potential good      Certification From: 2024  To:2024        Charges: Man3 (38)    Total Timed Treatment: 38 min  Total Treatment Time: 38 min            FOR Reference ONLY   LUMBAR SPINE EVALUATION:   Referring Physician: Dr. Brown  Diagnosis:  Chronic bilateral low back pain without sciatica (M54.50,G89.29)  Degeneration of lumbar or lumbosacral intervertebral disc (M51.37)     Date of Service: 2024   Date of Onset: had pain for several years, worse over the summer    PATIENT SUMMARY   Lorri Gunderson is a 39 year old y/o female who presents to therapy today with complaints of LBP and feeling of locked up..    States she has two private visits of PT.  Reports she also a chiropractor which was very helpful.       History of current condition: States she has scoliosis and had pain for several years.  Reports her pain got worse over the summer after horse back riding and getting off her normal workout routine.    Pain Ratin/10 VAS    Current functional limitations include limited ability to .   Lorri describes prior level of function independent in all activities. Pt goals include decrease pain, return to workouts.    Past medical history was reviewed with Lorri. Significant findings include   Past Medical History:    Chronic pain of both knees    Formatting of this note might be different from the original.  Last Assessment & Plan: She is training for marathon, notes pain in her knees that is interfering with her progress. No associated swelling or joint instability.  Plan:  Referred for PT for further evaluation and management.    Crushing injury of toe of right foot    Esophageal reflux    Eustachian tube dysfunction, bilateral    Lumbar disc herniation    Lumbar radicular pain    Tension-type headache, not intractable    Formatting of this note might be different from the original. Last Assessment & Plan: Off and on headaches for the last 2-3 weeks, predominantly around her temples, without associated visual change, nausea, vomiting, or light sensitivity.  Minimal caffeine intake.  Some increased stress, lost a friend to cancer, going through process of possibly having third child through surrogate.  Training for      Past Surgical History:   Procedure Laterality Date          Enlarge breast with implant      Enlarge breast with implant       .     Are you being hurt, frightened, demeaned, or taken advantage of by anyone at your home or in your life?  No     Have you recently had thoughts of hurting yourself?  No    Have you tried to hurt yourself in the past?  No      ASSESSMENT:   Lorri Gunderson presents back to therapy with reports of LBP and feelings of instability.  She denies any radicular symptoms af this point but reports significant limitations in her ability to workout, bend/lift/twist.  She displays very limited lumbar ROM, limited pelvic mobility and increased pain that limits her functional mobility.  The pt will benefit from skilled PT to return to her PLOF.  Note the patient transferred back to the hospital after private therapy secondary to insurance reasons.  Recommend skilled PT 1 time per week for up to 12 visits.  The pt is in agreement with the POC.      Precautions:  scoliosis      OBJECTIVE:   Observation/Posture: scoliosis curvature of the spine with anterior pelvic  tilt  Gait: bilateral heel toe gait pattern without gross deviation  ROM:     Trunk         Pain (+/-)   Flexion  Limited 50%                       Extension Limited 25% At L4L5 and L5S1   R Sidebend Limited 25%    L Sidebend Limited 10%    R Rotation NT    L Rotation NT    R Sideglide WFL    L Sideglide WFL      Repeated Motion Testing: REIL decreased better    SANDRA Testing R L   Cervical Axial Rotation + +   Apical Expansion WFL WFL   Adduction Drop Test - +   Extension Drop Test NT NT   SLR 90 90   Trunk Rotation NT NT   Posterior Mediastinum Expansion Test WFL WFL   Standing Reach Test - -   PART + -   PADT + +   Hrusta Adduction Lift Test 3/5 3/5   Hrusta Abduction Lift Test NT NT       STRENGTH:   5/5 MMT Scale   Left  Right Comments   Hip Flexion (L2) 5 5    Knee Extension (L3) 5 5    Knee Flexion 5 5    Ankle DF (L4) 5 4+    EHL (L5) 5 4    Ankle PF (S1) 5 4+    Hip Abduction NT NT    Hip Extension NT NT      Flexibility:      R L   Hamstrings Long  long   Piriformis short WFL       Palpation: TTP over the L5 spinous process  Neuro Screen: (-) heel walking and toe walking  Accessory Motion: decreased mobility L5S1 with PA mobs  Special Tests: (-) SLR, (-) SLump    Outcome Surverys  Oswestry Disability Index Score  Score: 8 % (11/28/2023  9:20 AM)

## 2024-10-08 ENCOUNTER — APPOINTMENT (OUTPATIENT)
Dept: PHYSICAL THERAPY | Facility: HOSPITAL | Age: 39
End: 2024-10-08
Attending: INTERNAL MEDICINE
Payer: COMMERCIAL

## 2024-10-15 ENCOUNTER — OFFICE VISIT (OUTPATIENT)
Dept: PHYSICAL THERAPY | Facility: HOSPITAL | Age: 39
End: 2024-10-15
Attending: INTERNAL MEDICINE
Payer: COMMERCIAL

## 2024-10-15 PROCEDURE — 97112 NEUROMUSCULAR REEDUCATION: CPT | Performed by: PHYSICAL THERAPIST

## 2024-10-15 PROCEDURE — 97140 MANUAL THERAPY 1/> REGIONS: CPT | Performed by: PHYSICAL THERAPIST

## 2024-10-15 NOTE — PROGRESS NOTES
10/15/2024    Dx: Chronic bilateral low back pain without sciatica (M54.50,G89.29)  Degeneration of lumbar or lumbosacral intervertebral disc (M51.37)               Authorized # of Visits:  12 visits on POC          Next MD visit: none   Fall Risk: standard         Precautions:  scoliosis  Medication Changes since last visit?: No    Subjective: Reports she still has pain in the morning for the first hour. States she wouldn't be able to work out first thing in the morning. States she working out at [employer] down the day and reports she feels she is getting stronger.  Reports their are some moves she can't do like certain lunges, burble's.  Pain Rating:  3/10 VAS    Objective:        9/3/2024  Visit #3 9/24/2024  Visit #4 10/1/2024  Visit #5 10/15/2024  Visit #6   Manual Therapy Central PA's L4L5 and L5S1    Lumbar rocking    STM lumbar paraspinals    MFR lumbar paraspinals     R on R scaral rotation MET Central PA's L4L5 and L5S1    Lumbar rocking    STM lumbar paraspinals    MFR lumbar paraspinals     R on R scaral rotation MET Central PA's L4L5 and L5S1    Lumbar rocking    STM lumbar paraspinals    MFR lumbar paraspinals    Central PA's L4L5 and L5S1    Lumbar rocking    STM lumbar paraspinals    MFR lumbar paraspinals    Therapeutic Exercise       Therapeutic Activity    Form with gym based execises   Neuromuscular Education Paraspinal release with L hamstring    L adductor pull backs   Planks      L SL, resisted R glut max    Standing R glut max   TNE Education       HEP Paraspinal release with L hamstring    L adductor pull backs Continue with current HEP Continue with current HEP L SL, resisted R glut max    Standing R glut max       Assessment: No adverse effects to treatment.  Continued manual therapy this date, note TTP over the L4L5 and L5S1 segments. Added more activities to improve her R glut max recruitment.     Goals:      The pt was educated on the plan of care, purpose and individual goals for therapy,  precautions for therapy.  All questions were answered.     1.  The pt will be independent in their HEP.  2.  Centralization of symptoms to the lumbar spine.  3.  The pt will be independent in a modified workout program.  4.  The pt will be able to bend over to pick something off the floor.  5.   The pt will be able to drive for 1 hour without an increase in pain.      Frequency / Duration: Patient will be seen for 1-2 x/week or a total of 12 visits over a 90 day period.  Treatment will include: Manual Therapy; Therapeutic Exercises; Neuromuscular Re-education; Therapeutic Activity;  Patient education: Home exercise program instruction; TNE Education; Modalities as needed.    Education or treatment limitation: None  Rehab Potential good      Certification From: 2024  To:2024        Charges: Man2 (23), NM2(23)    Total Timed Treatment: 46 min  Total Treatment Time: 46 min            FOR Reference ONLY   LUMBAR SPINE EVALUATION:   Referring Physician: Dr. Brown  Diagnosis:  Chronic bilateral low back pain without sciatica (M54.50,G89.29)  Degeneration of lumbar or lumbosacral intervertebral disc (M51.37)     Date of Service: 2024   Date of Onset: had pain for several years, worse over the summer    PATIENT SUMMARY   Lorri Gunderson is a 39 year old y/o female who presents to therapy today with complaints of LBP and feeling of locked up..    States she has two private visits of PT.  Reports she also a chiropractor which was very helpful.       History of current condition: States she has scoliosis and had pain for several years.  Reports her pain got worse over the summer after horse back riding and getting off her normal workout routine.    Pain Ratin/10 VAS    Current functional limitations include limited ability to .   Lorri describes prior level of function independent in all activities. Pt goals include decrease pain, return to workouts.    Past medical history was reviewed with  . Significant findings include   Past Medical History:    Chronic pain of both knees    Formatting of this note might be different from the original. Last Assessment & Plan: She is training for marathon, notes pain in her knees that is interfering with her progress. No associated swelling or joint instability.  Plan:  Referred for PT for further evaluation and management.    Crushing injury of toe of right foot    Esophageal reflux    Eustachian tube dysfunction, bilateral    Lumbar disc herniation    Lumbar radicular pain    Tension-type headache, not intractable    Formatting of this note might be different from the original. Last Assessment & Plan: Off and on headaches for the last 2-3 weeks, predominantly around her temples, without associated visual change, nausea, vomiting, or light sensitivity.  Minimal caffeine intake.  Some increased stress, lost a friend to cancer, going through process of possibly having third child through surrogate.  Training for      Past Surgical History:   Procedure Laterality Date          Enlarge breast with implant      Enlarge breast with implant       .     Are you being hurt, frightened, demeaned, or taken advantage of by anyone at your home or in your life?  No     Have you recently had thoughts of hurting yourself?  No    Have you tried to hurt yourself in the past?  No      ASSESSMENT:   Lorri Gunderson presents back to therapy with reports of LBP and feelings of instability.  She denies any radicular symptoms af this point but reports significant limitations in her ability to workout, bend/lift/twist.  She displays very limited lumbar ROM, limited pelvic mobility and increased pain that limits her functional mobility.  The pt will benefit from skilled PT to return to her PLOF.  Note the patient transferred back to the hospital after private therapy secondary to insurance reasons.  Recommend skilled PT 1 time per week for up to 12 visits.  The pt is  in agreement with the POC.      Precautions:  scoliosis      OBJECTIVE:   Observation/Posture: scoliosis curvature of the spine with anterior pelvic tilt  Gait: bilateral heel toe gait pattern without gross deviation  ROM:     Trunk         Pain (+/-)   Flexion  Limited 50%                       Extension Limited 25% At L4L5 and L5S1   R Sidebend Limited 25%    L Sidebend Limited 10%    R Rotation NT    L Rotation NT    R Sideglide WFL    L Sideglide WFL      Repeated Motion Testing: REIL decreased better    SANDRA Testing R L   Cervical Axial Rotation + +   Apical Expansion WFL WFL   Adduction Drop Test - +   Extension Drop Test NT NT   SLR 90 90   Trunk Rotation NT NT   Posterior Mediastinum Expansion Test WFL WFL   Standing Reach Test - -   PART + -   PADT + +   Hrusta Adduction Lift Test 3/5 3/5   Hrusta Abduction Lift Test NT NT       STRENGTH:   5/5 MMT Scale   Left  Right Comments   Hip Flexion (L2) 5 5    Knee Extension (L3) 5 5    Knee Flexion 5 5    Ankle DF (L4) 5 4+    EHL (L5) 5 4    Ankle PF (S1) 5 4+    Hip Abduction NT NT    Hip Extension NT NT      Flexibility:      R L   Hamstrings Long  long   Piriformis short WFL       Palpation: TTP over the L5 spinous process  Neuro Screen: (-) heel walking and toe walking  Accessory Motion: decreased mobility L5S1 with PA mobs  Special Tests: (-) SLR, (-) SLump    Outcome Surverys  Oswestry Disability Index Score  Score: 8 % (11/28/2023  9:20 AM)

## 2024-10-22 ENCOUNTER — OFFICE VISIT (OUTPATIENT)
Dept: PHYSICAL THERAPY | Facility: HOSPITAL | Age: 39
End: 2024-10-22
Attending: INTERNAL MEDICINE
Payer: COMMERCIAL

## 2024-10-22 PROCEDURE — 97530 THERAPEUTIC ACTIVITIES: CPT | Performed by: PHYSICAL THERAPIST

## 2024-10-22 PROCEDURE — 97112 NEUROMUSCULAR REEDUCATION: CPT | Performed by: PHYSICAL THERAPIST

## 2024-10-22 NOTE — PROGRESS NOTES
10/22/2024  Dx: Chronic bilateral low back pain without sciatica (M54.50,G89.29)  Degeneration of lumbar or lumbosacral intervertebral disc (M51.37)               Authorized # of Visits:  12 visits on POC          Next MD visit: none   Fall Risk: standard         Precautions:  scoliosis  Medication Changes since last visit?: No    Subjective: States her back is still very sore.  States she feels like if she went horse back riding she could really flare up her pain.  Reports she tried the single leg dead lift on the R side and felt very off balance.  States she feels better after manual work but it is very temporary.  Pain Rating:  3/10 VAS    Objective:        9/3/2024  Visit #3 9/24/2024  Visit #4 10/1/2024  Visit #5 10/15/2024  Visit #6 10/22/2024  Visit #7   Manual Therapy Central PA's L4L5 and L5S1    Lumbar rocking    STM lumbar paraspinals    MFR lumbar paraspinals     R on R scaral rotation MET Central PA's L4L5 and L5S1    Lumbar rocking    STM lumbar paraspinals    MFR lumbar paraspinals     R on R scaral rotation MET Central PA's L4L5 and L5S1    Lumbar rocking    STM lumbar paraspinals    MFR lumbar paraspinals    Central PA's L4L5 and L5S1    Lumbar rocking    STM lumbar paraspinals    MFR lumbar paraspinals     Therapeutic Exercise        Therapeutic Activity    Form with gym based execises Education on how head and neck position affect her alingment   Neuromuscular Education Paraspinal release with L hamstring    L adductor pull backs   Planks      L SL, resisted R glut max    Standing R glut max 90/90 repositioning with L hip shift    L SL, resisted R glut max    Standing R glut max into the will    Sternal positional lift    Modified all 4 belly lift    R lat dorsi inhibition with door frame       TNE Education        HEP Paraspinal release with L hamstring    L adductor pull backs Continue with current HEP Continue with current HEP L SL, resisted R glut max    Standing R glut max Sternal positional  lift    Modified all 4 belly lift    R lat dorsi inhibition with door frame       Assessment: No adverse effects to treatment.  The pt continue to display (+) ADT bilaterally and is not repositioned.  She has significant difficulty turning off her hip flexors.  She also has difficulty inhibiting her neck during all activities.  Needs further R lat inhibition and PEC inhibition.  Encouraged modified all 4 belly lift and lat dorsi inhibition before and after exercise classes.    Goals:      The pt was educated on the plan of care, purpose and individual goals for therapy, precautions for therapy.  All questions were answered.     1.  The pt will be independent in their HEP.  2.  Centralization of symptoms to the lumbar spine.  3.  The pt will be independent in a modified workout program.  4.  The pt will be able to bend over to pick something off the floor.  5.   The pt will be able to drive for 1 hour without an increase in pain.      Frequency / Duration: Patient will be seen for 1-2 x/week or a total of 12 visits over a 90 day period.  Treatment will include: Manual Therapy; Therapeutic Exercises; Neuromuscular Re-education; Therapeutic Activity;  Patient education: Home exercise program instruction; TNE Education; Modalities as needed.    Education or treatment limitation: None  Rehab Potential good      Certification From: 8/26/2024  To:11/24/2024        Charges: NM3 (38), TA1(8)   Total Timed Treatment: 46 min  Total Treatment Time: 46 min            FOR Reference ONLY   LUMBAR SPINE EVALUATION:   Referring Physician: Dr. Brown  Diagnosis:  Chronic bilateral low back pain without sciatica (M54.50,G89.29)  Degeneration of lumbar or lumbosacral intervertebral disc (M51.37)     Date of Service: 8/26/2024   Date of Onset: had pain for several years, worse over the summer    PATIENT SUMMARY   Lorri Gunderson is a 39 year old y/o female who presents to therapy today with complaints of LBP and feeling of locked  up..    States she has two private visits of PT.  Reports she also a chiropractor which was very helpful.       History of current condition: States she has scoliosis and had pain for several years.  Reports her pain got worse over the summer after horse back riding and getting off her normal workout routine.    Pain Ratin/10 VAS    Current functional limitations include limited ability to .   Lorri describes prior level of function independent in all activities. Pt goals include decrease pain, return to workouts.    Past medical history was reviewed with Lorri. Significant findings include   Past Medical History:    Chronic pain of both knees    Formatting of this note might be different from the original. Last Assessment & Plan: She is training for marathon, notes pain in her knees that is interfering with her progress. No associated swelling or joint instability.  Plan:  Referred for PT for further evaluation and management.    Crushing injury of toe of right foot    Esophageal reflux    Eustachian tube dysfunction, bilateral    Lumbar disc herniation    Lumbar radicular pain    Tension-type headache, not intractable    Formatting of this note might be different from the original. Last Assessment & Plan: Off and on headaches for the last 2-3 weeks, predominantly around her temples, without associated visual change, nausea, vomiting, or light sensitivity.  Minimal caffeine intake.  Some increased stress, lost a friend to cancer, going through process of possibly having third child through surrogate.  Training for      Past Surgical History:   Procedure Laterality Date          Enlarge breast with implant      Enlarge breast with implant       .     Are you being hurt, frightened, demeaned, or taken advantage of by anyone at your home or in your life?  No     Have you recently had thoughts of hurting yourself?  No    Have you tried to hurt yourself in the past?  No      ASSESSMENT:   Lorri  Jalyn presents back to therapy with reports of LBP and feelings of instability.  She denies any radicular symptoms af this point but reports significant limitations in her ability to workout, bend/lift/twist.  She displays very limited lumbar ROM, limited pelvic mobility and increased pain that limits her functional mobility.  The pt will benefit from skilled PT to return to her PLOF.  Note the patient transferred back to the hospital after private therapy secondary to insurance reasons.  Recommend skilled PT 1 time per week for up to 12 visits.  The pt is in agreement with the POC.      Precautions:  scoliosis      OBJECTIVE:   Observation/Posture: scoliosis curvature of the spine with anterior pelvic tilt  Gait: bilateral heel toe gait pattern without gross deviation  ROM:     Trunk         Pain (+/-)   Flexion  Limited 50%                       Extension Limited 25% At L4L5 and L5S1   R Sidebend Limited 25%    L Sidebend Limited 10%    R Rotation NT    L Rotation NT    R Sideglide WFL    L Sideglide WFL      Repeated Motion Testing: REIL decreased better    SANDRA Testing R L   Cervical Axial Rotation + +   Apical Expansion WFL WFL   Adduction Drop Test - +   Extension Drop Test NT NT   SLR 90 90   Trunk Rotation NT NT   Posterior Mediastinum Expansion Test WFL WFL   Standing Reach Test - -   PART + -   PADT + +   Hrusta Adduction Lift Test 3/5 3/5   Hrusta Abduction Lift Test NT NT       STRENGTH:   5/5 MMT Scale   Left  Right Comments   Hip Flexion (L2) 5 5    Knee Extension (L3) 5 5    Knee Flexion 5 5    Ankle DF (L4) 5 4+    EHL (L5) 5 4    Ankle PF (S1) 5 4+    Hip Abduction NT NT    Hip Extension NT NT      Flexibility:      R L   Hamstrings Long  long   Piriformis short WFL       Palpation: TTP over the L5 spinous process  Neuro Screen: (-) heel walking and toe walking  Accessory Motion: decreased mobility L5S1 with PA mobs  Special Tests: (-) SLR, (-) SLump    Outcome Surverys  Oswestry Disability  Index Score  Score: 8 % (11/28/2023  9:20 AM)

## 2024-10-29 ENCOUNTER — OFFICE VISIT (OUTPATIENT)
Dept: PHYSICAL THERAPY | Facility: HOSPITAL | Age: 39
End: 2024-10-29
Attending: INTERNAL MEDICINE
Payer: COMMERCIAL

## 2024-10-29 PROCEDURE — 97112 NEUROMUSCULAR REEDUCATION: CPT | Performed by: PHYSICAL THERAPIST

## 2024-10-29 NOTE — PROGRESS NOTES
10/29/2024    Dx: Chronic bilateral low back pain without sciatica (M54.50,G89.29)  Degeneration of lumbar or lumbosacral intervertebral disc (M51.37)               Authorized # of Visits:  12 visits on POC          Next MD visit: none   Fall Risk: standard         Precautions:  scoliosis  Medication Changes since last visit?: No    Subjective: States she is doing ok.  Reports she slept without an pillow under her legs and woke up this morning very stiff.    Pain Rating:  3/10 VAS    Objective:        9/3/2024  Visit #3 9/24/2024  Visit #4 10/1/2024  Visit #5 10/15/2024  Visit #6 10/22/2024  Visit #7 10/29/2024  Visit #8   Manual Therapy Central PA's L4L5 and L5S1    Lumbar rocking    STM lumbar paraspinals    MFR lumbar paraspinals     R on R scaral rotation MET Central PA's L4L5 and L5S1    Lumbar rocking    STM lumbar paraspinals    MFR lumbar paraspinals     R on R scaral rotation MET Central PA's L4L5 and L5S1    Lumbar rocking    STM lumbar paraspinals    MFR lumbar paraspinals    Central PA's L4L5 and L5S1    Lumbar rocking    STM lumbar paraspinals    MFR lumbar paraspinals      Therapeutic Exercise         Therapeutic Activity    Form with gym based execises Education on how head and neck position affect her alingment    Neuromuscular Education Paraspinal release with L hamstring    L adductor pull backs   Planks      L SL, resisted R glut max    Standing R glut max 90/90 repositioning with L hip shift    L SL, resisted R glut max    Standing R glut max into the will    Sternal positional lift    Modified all 4 belly lift    R lat dorsi inhibition with door frame     Supine diagonal flexion    Standing diagonal flexion    Alternating triceps pull downs    Standing bilateral triceps pull downs with lower traps    TNE Education         HEP Paraspinal release with L hamstring    L adductor pull backs Continue with current HEP Continue with current HEP L SL, resisted R glut max    Standing R glut max Sternal  positional lift    Modified all 4 belly lift    R lat dorsi inhibition with door frame Supine diagonal flexion    Standing diagonal flexion    Alternating triceps pull downs    Standing bilateral triceps pull downs with lower traps        Assessment: No adverse effects to treatment.  The pt continues to have central LBP.  Added R triceps/ R low trap activities to decrease her R hip hump.  Needs further pelvic stabilization to improve pelvic position.    Goals:      The pt was educated on the plan of care, purpose and individual goals for therapy, precautions for therapy.  All questions were answered.     1.  The pt will be independent in their HEP.  2.  Centralization of symptoms to the lumbar spine.  3.  The pt will be independent in a modified workout program.  4.  The pt will be able to bend over to pick something off the floor.  5.   The pt will be able to drive for 1 hour without an increase in pain.      Frequency / Duration: Patient will be seen for 1-2 x/week or a total of 12 visits over a 90 day period.  Treatment will include: Manual Therapy; Therapeutic Exercises; Neuromuscular Re-education; Therapeutic Activity;  Patient education: Home exercise program instruction; TNE Education; Modalities as needed.    Education or treatment limitation: None  Rehab Potential good      Certification From: 8/26/2024  To:11/24/2024        Charges: NM3 (38) Total Timed Treatment: 38 min  Total Treatment Time: 38 min            FOR Reference ONLY   LUMBAR SPINE EVALUATION:   Referring Physician: Dr. Brown  Diagnosis:  Chronic bilateral low back pain without sciatica (M54.50,G89.29)  Degeneration of lumbar or lumbosacral intervertebral disc (M51.37)     Date of Service: 8/26/2024   Date of Onset: had pain for several years, worse over the summer    PATIENT SUMMARY   Lorri Gunderson is a 39 year old y/o female who presents to therapy today with complaints of LBP and feeling of locked up..    States she has two  private visits of PT.  Reports she also a chiropractor which was very helpful.       History of current condition: States she has scoliosis and had pain for several years.  Reports her pain got worse over the summer after horse back riding and getting off her normal workout routine.    Pain Ratin/10 VAS    Current functional limitations include limited ability to .   Lorri describes prior level of function independent in all activities. Pt goals include decrease pain, return to workouts.    Past medical history was reviewed with Lorri. Significant findings include   Past Medical History:    Chronic pain of both knees    Formatting of this note might be different from the original. Last Assessment & Plan: She is training for marathon, notes pain in her knees that is interfering with her progress. No associated swelling or joint instability.  Plan:  Referred for PT for further evaluation and management.    Crushing injury of toe of right foot    Esophageal reflux    Eustachian tube dysfunction, bilateral    Lumbar disc herniation    Lumbar radicular pain    Tension-type headache, not intractable    Formatting of this note might be different from the original. Last Assessment & Plan: Off and on headaches for the last 2-3 weeks, predominantly around her temples, without associated visual change, nausea, vomiting, or light sensitivity.  Minimal caffeine intake.  Some increased stress, lost a friend to cancer, going through process of possibly having third child through surrogate.  Training for      Past Surgical History:   Procedure Laterality Date          Enlarge breast with implant      Enlarge breast with implant       .     Are you being hurt, frightened, demeaned, or taken advantage of by anyone at your home or in your life?  No     Have you recently had thoughts of hurting yourself?  No    Have you tried to hurt yourself in the past?  No      ASSESSMENT:   Lorri Jalyn presents back to  therapy with reports of LBP and feelings of instability.  She denies any radicular symptoms af this point but reports significant limitations in her ability to workout, bend/lift/twist.  She displays very limited lumbar ROM, limited pelvic mobility and increased pain that limits her functional mobility.  The pt will benefit from skilled PT to return to her PLOF.  Note the patient transferred back to the hospital after private therapy secondary to insurance reasons.  Recommend skilled PT 1 time per week for up to 12 visits.  The pt is in agreement with the POC.      Precautions:  scoliosis      OBJECTIVE:   Observation/Posture: scoliosis curvature of the spine with anterior pelvic tilt  Gait: bilateral heel toe gait pattern without gross deviation  ROM:     Trunk         Pain (+/-)   Flexion  Limited 50%                       Extension Limited 25% At L4L5 and L5S1   R Sidebend Limited 25%    L Sidebend Limited 10%    R Rotation NT    L Rotation NT    R Sideglide WFL    L Sideglide WFL      Repeated Motion Testing: REIL decreased better    SANDRA Testing R L   Cervical Axial Rotation + +   Apical Expansion WFL WFL   Adduction Drop Test - +   Extension Drop Test NT NT   SLR 90 90   Trunk Rotation NT NT   Posterior Mediastinum Expansion Test WFL WFL   Standing Reach Test - -   PART + -   PADT + +   Hrusta Adduction Lift Test 3/5 3/5   Hrusta Abduction Lift Test NT NT       STRENGTH:   5/5 MMT Scale   Left  Right Comments   Hip Flexion (L2) 5 5    Knee Extension (L3) 5 5    Knee Flexion 5 5    Ankle DF (L4) 5 4+    EHL (L5) 5 4    Ankle PF (S1) 5 4+    Hip Abduction NT NT    Hip Extension NT NT      Flexibility:      R L   Hamstrings Long  long   Piriformis short WFL       Palpation: TTP over the L5 spinous process  Neuro Screen: (-) heel walking and toe walking  Accessory Motion: decreased mobility L5S1 with PA mobs  Special Tests: (-) SLR, (-) SLump    Outcome Surverys  Oswestry Disability Index Score  Score: 8 %  (11/28/2023  9:20 AM)

## 2024-11-05 ENCOUNTER — PATIENT MESSAGE (OUTPATIENT)
Dept: PHYSICAL MEDICINE AND REHAB | Facility: CLINIC | Age: 39
End: 2024-11-05

## 2024-11-05 ENCOUNTER — TELEPHONE (OUTPATIENT)
Dept: PHYSICAL THERAPY | Facility: HOSPITAL | Age: 39
End: 2024-11-05

## 2024-11-05 DIAGNOSIS — M48.061 LUMBAR FORAMINAL STENOSIS: ICD-10-CM

## 2024-11-05 DIAGNOSIS — M51.9 LUMBAR DISC DISEASE: ICD-10-CM

## 2024-11-05 DIAGNOSIS — M41.125 ADOLESCENT IDIOPATHIC SCOLIOSIS OF THORACOLUMBAR REGION: ICD-10-CM

## 2024-11-05 DIAGNOSIS — M51.26 LUMBAR HERNIATED DISC: ICD-10-CM

## 2024-11-05 DIAGNOSIS — M62.89 PELVIC FLOOR DYSFUNCTION IN FEMALE: Primary | ICD-10-CM

## 2024-11-05 DIAGNOSIS — M54.16 LUMBAR RADICULOPATHY: ICD-10-CM

## 2024-11-27 ENCOUNTER — NURSE TRIAGE (OUTPATIENT)
Dept: INTERNAL MEDICINE CLINIC | Facility: CLINIC | Age: 39
End: 2024-11-27

## 2024-11-27 ENCOUNTER — TELEPHONE (OUTPATIENT)
Dept: PHYSICAL MEDICINE AND REHAB | Facility: CLINIC | Age: 39
End: 2024-11-27

## 2024-11-27 PROBLEM — M62.89 PELVIC FLOOR DYSFUNCTION IN FEMALE: Status: ACTIVE | Noted: 2024-11-27

## 2024-11-27 NOTE — TELEPHONE ENCOUNTER
Patient called back, states can't some in due to having bad back spasm.  Is lying on floor at home. States she took a Flexeril and a pain pill, waiting for back to relax.  Her  is with her.  Advised her to call 911 if unable to move.  If back improves, can to to [facility].  FYI to Dr Brown.

## 2024-11-27 NOTE — TELEPHONE ENCOUNTER
Patient called back, wanting to know if there was a response.   Advised message sent but we would need to wait for response.     Patient asked if there was someone she could see this morning. Scheduled for below and patient will be there for visit.     Future Appointments   Date Time Provider Department Center   11/27/2024  9:45 AM Valerio Brown MD [facility]

## 2024-11-27 NOTE — TELEPHONE ENCOUNTER
Action Requested: Summary for Provider     []  Critical Lab, Recommendations Needed  [x] Need Additional Advice  []   FYI    []   Need Orders  [x] Need Medications Sent to Pharmacy  []  Other     SUMMARY: Per protocol, patient should be seen in the office today. Requesting prescription for medrol dose pack which what she usually gets when she gets flareup of back pain. Advised to schedule an appointment but declines. States she has 16 people coming over for Thanksgiving and it would really help if she starts taking the medication.    Dr. Magy Dorsey please advise.     Reason for call: Low Back Pain  Onset: 11/26    Patient reports of severe back pain flare last night. States she has history of scoliosis and having this flareup in the past. Medrol dose pack usually helps. Reports pain has gotten worse through the night that she had a hard time sleeping and can barely walk today. She follows with Dr. Aguilar for this. Tried taking meloxicam last night which did nothing. Requesting prescription for Medrol dose pack. Care advice given per protocol. Patient verbalized understanding and agreed with plan of care.     Reason for Disposition   SEVERE back pain (e.g., excruciating, unable to do any normal activities) and not improved after pain medicine and CARE ADVICE    Protocols used: Back Pain-A-[place]

## 2024-11-29 NOTE — TELEPHONE ENCOUNTER
Pt would like to speak with clinical staff asap, had an episode of back pain on 11/27/24 and was stuck on the floor. Please advise.

## 2024-12-02 ENCOUNTER — OFFICE VISIT (OUTPATIENT)
Dept: INTERNAL MEDICINE CLINIC | Facility: CLINIC | Age: 39
End: 2024-12-02

## 2024-12-02 ENCOUNTER — OFFICE VISIT (OUTPATIENT)
Dept: PHYSICAL MEDICINE AND REHAB | Facility: CLINIC | Age: 39
End: 2024-12-02
Payer: COMMERCIAL

## 2024-12-02 ENCOUNTER — TELEPHONE (OUTPATIENT)
Dept: INTERNAL MEDICINE CLINIC | Facility: CLINIC | Age: 39
End: 2024-12-02

## 2024-12-02 VITALS — HEIGHT: 65 IN | BODY MASS INDEX: 20.16 KG/M2 | WEIGHT: 121 LBS

## 2024-12-02 VITALS
DIASTOLIC BLOOD PRESSURE: 83 MMHG | OXYGEN SATURATION: 99 % | HEART RATE: 103 BPM | SYSTOLIC BLOOD PRESSURE: 118 MMHG | HEIGHT: 65 IN | BODY MASS INDEX: 20.16 KG/M2 | WEIGHT: 121 LBS | RESPIRATION RATE: 18 BRPM

## 2024-12-02 DIAGNOSIS — F41.9 ANXIETY: ICD-10-CM

## 2024-12-02 DIAGNOSIS — M54.16 LUMBAR RADICULOPATHY: ICD-10-CM

## 2024-12-02 DIAGNOSIS — M48.061 LUMBAR FORAMINAL STENOSIS: ICD-10-CM

## 2024-12-02 DIAGNOSIS — K64.9 HEMORRHOIDS, UNSPECIFIED HEMORRHOID TYPE: Primary | ICD-10-CM

## 2024-12-02 DIAGNOSIS — M51.26 LUMBAR HERNIATED DISC: Primary | ICD-10-CM

## 2024-12-02 DIAGNOSIS — M41.125 ADOLESCENT IDIOPATHIC SCOLIOSIS OF THORACOLUMBAR REGION: ICD-10-CM

## 2024-12-02 DIAGNOSIS — N85.6 ASHERMAN'S SYNDROME: ICD-10-CM

## 2024-12-02 DIAGNOSIS — M51.9 LUMBAR DISC DISEASE: ICD-10-CM

## 2024-12-02 PROCEDURE — 3074F SYST BP LT 130 MM HG: CPT | Performed by: NURSE PRACTITIONER

## 2024-12-02 PROCEDURE — 3008F BODY MASS INDEX DOCD: CPT | Performed by: PHYSICAL MEDICINE & REHABILITATION

## 2024-12-02 PROCEDURE — 99215 OFFICE O/P EST HI 40 MIN: CPT | Performed by: PHYSICAL MEDICINE & REHABILITATION

## 2024-12-02 PROCEDURE — 99213 OFFICE O/P EST LOW 20 MIN: CPT | Performed by: NURSE PRACTITIONER

## 2024-12-02 PROCEDURE — 3008F BODY MASS INDEX DOCD: CPT | Performed by: NURSE PRACTITIONER

## 2024-12-02 PROCEDURE — 3079F DIAST BP 80-89 MM HG: CPT | Performed by: NURSE PRACTITIONER

## 2024-12-02 RX ORDER — METHYLPREDNISOLONE 4 MG/1
TABLET ORAL
Qty: 1 EACH | Refills: 0 | Status: SHIPPED | OUTPATIENT
Start: 2024-12-02

## 2024-12-02 RX ORDER — PRAMOXINE HYDROCHLORIDE 10 MG/G
1 AEROSOL, FOAM TOPICAL EVERY 2 HOUR PRN
Qty: 15 G | Refills: 0 | Status: SHIPPED | OUTPATIENT
Start: 2024-12-02

## 2024-12-02 RX ORDER — HYDROCORTISONE 25 MG/G
1 CREAM TOPICAL 2 TIMES DAILY
Qty: 20 G | Refills: 0 | Status: SHIPPED | OUTPATIENT
Start: 2024-12-02

## 2024-12-02 RX ORDER — DIBUCAINE 1% 1 G/100G
1 CREAM TOPICAL 3 TIMES DAILY
Qty: 28 G | Refills: 0 | Status: SHIPPED | OUTPATIENT
Start: 2024-12-02

## 2024-12-02 NOTE — TELEPHONE ENCOUNTER
ALTERNATIVE REQUESTED    Current Outpatient Medications:     pramoxine perianal 1 % External Foam, Place 1 applicator rectally every 2 (two) hours as needed for Hemorrhoids., Disp: 15 g, Rfl: 0

## 2024-12-02 NOTE — TELEPHONE ENCOUNTER
Patient has an appointment with Dr. Aguilar today - 12/02/2024. Patient reports she \"bent wrong\" on Tuesday and by Wednesday patient was on the floor and could not get up d/t pain.    Started medrol pack on 11/27/2024 which has been providing good relief since. Patient also took an old RX of cyclobenzaprine.      Patient had to disconnect call as she as at her primary care doctor's office. However, d/t patient's upcoming appointment - she will speak with Dr. Aguilar regarding her pain tonight.

## 2024-12-02 NOTE — PROGRESS NOTES
Subjective:   Lorri Gunderson is a 39 year old female who presents for Hemorrhoids (Pt. Complains of hemorrhoids since late Wednesday night. Pt. Would like recommendations of what to do she has been doing sit baths with epson salt.  )     Presents for evaluation of painful hemorrhoids since Wednesday evening.  She strained her lower back a couple of days prior, and feels that she was sitting strangely and strained to have a bowel movement.  Since that time she noticed external hemorrhoids, and has had pain in the area.    She has a history of hemorrhoids in the past, they act up intermittently over the past 5 years.  No bleeding.  Is using extra strength hydrocortisone cream, Preparation H.  Wants to make sure they are not strangulated or thrombosed.    History/Other:    Chief Complaint Reviewed and Verified  Nursing Notes Reviewed and   Verified  Tobacco Reviewed  Allergies Reviewed  Medications Reviewed    Medical History Reviewed  Surgical History Reviewed  OB Status Reviewed    Family History Reviewed  Social History Reviewed         Tobacco:  She has never smoked tobacco.    Current Outpatient Medications   Medication Sig Dispense Refill    pramoxine perianal 1 % External Foam Place 1 applicator rectally every 2 (two) hours as needed for Hemorrhoids. 15 g 0    hydrocortisone 2.5 % External Cream Apply 1 Application topically 2 (two) times daily. 20 g 0    MELOXICAM 15 MG Oral Tab TAKE 1 TABLET (15 MG TOTAL) BY MOUTH DAILY. 30 tablet 0    predniSONE 10 MG (21) Oral Tablet Therapy Pack Take 10 mg by mouth daily. 7 tabs day 1, 6 tabs day 2 , 5 tabs day 3, 4 tabs day 4, 3 tabs day 5, 2 tabs day 6, 1 tab day 7 28 each 0    methylPREDNISolone (MEDROL) 4 MG Oral Tablet Therapy Pack As directed. 1 each 0         Review of Systems:  Review of Systems  10 point review of systems otherwise negative with the exception of HPI and assessment and plan.    Objective:   /83   Pulse 103   Resp 18   Ht  5' 5\" (1.651 m)   Wt 121 lb (54.9 kg)   LMP 11/25/2024 (Approximate)   SpO2 99%   BMI 20.14 kg/m²  Estimated body mass index is 20.14 kg/m² as calculated from the following:    Height as of this encounter: 5' 5\" (1.651 m).    Weight as of this encounter: 121 lb (54.9 kg).      Physical Exam  Constitutional:       General: She is not in acute distress.  Genitourinary:     Comments: 2 external hemorrhoids noted; L>R, L more tender than right. Pink/flesh colored. No erythema, edema. No evidence of strangulation. No bleeding.  Neurological:      Mental Status: She is alert.       Assessment & Plan:   1. Hemorrhoids, unspecified hemorrhoid type (Primary)  -     Pramoxine HCl (Perianal); Place 1 applicator rectally every 2 (two) hours as needed for Hemorrhoids.  Dispense: 15 g; Refill: 0  -     Hydrocortisone; Apply 1 Application topically 2 (two) times daily.  Dispense: 20 g; Refill: 0  - May use permethrin foam up to 5 times a day.  - May use hydrocortisone cream twice daily, use sparingly, do not use longer than 7 days.  - Increase fiber in diet, may try fiber supplement.  - Take MiraLAX daily with a full 8 ounces of water.  - Supportive care with Witch Hazel pads, Sitz bath, Use squatty potty, drink plenty of fluids.        Return if symptoms worsen or fail to improve.    RAVINDRA Lerner, 12/2/2024, 2:36 PM     This note was prepared using Dragon Medical voice recognition dictation software. As a result errors may occur. When identified, these errors have been corrected. While every attempt is made to correct errors during dictation discrepancies may still exist.

## 2024-12-02 NOTE — PATIENT INSTRUCTIONS
Plan  She will get back into the PT with Nisreen.    She will get the scoliosis x-rays.    If the PT is not helping after she has done 4 sessions, then she will need to have a new MRI of the lumbar spine.    She was prescribed a Medrol Dosepak incase she has another flare up of the pain.    She will follow up in 3-4 months or sooner if needed.

## 2024-12-02 NOTE — PATIENT INSTRUCTIONS
May use permethrin foam up to 5 times a day.  May use hydrocortisone cream twice daily, use sparingly, do not use longer than 7 days.  Increase fiber in diet, may try fiber supplement.  Take MiraLAX daily with a full 8 ounces of water.  Witch Hazel pads.  Sitz bath.  Use squatty potty.  Drink plenty of fluids.

## 2024-12-02 NOTE — PROGRESS NOTES
Low Back Pain H & P    Chief Complaint:   Chief Complaint   Patient presents with    Follow - Up     LOV 9/30/24 pt is here for a follow up on back pain.  No N/T. Not taking any pain meds or muscle relaxer's.Reports that she had a bad flare up Wednesday and was is in 10/10 severe pain.  No current physical therapy but would like a new order to continue.  Reports pain to be a dull ache. Pain 3/10     Nursing note reviewed and verified.    Patient was last seen on 9/30/2024.  She went back to the PT with Nisreen.  Sh started lifting weights and running.  Her back did not improve, but did not get worse.  She continues to struggle with getting her pelvis in neutral.  She was not having much pain, so she continued with the work outs.  She would be stiff and achy int he morning.  Then last Tuesday, she bend over and developed right low back and leg pain.  She tried the prone press ups and stretches which did not help.  She went to bed and awoke with severe pain.  She called her PCP and was going to go to their office, but she developed severe pain and could not get off of the floor for 2 hours.  Her brother-in-law called in a Medrol Dosepak for her since the muscle relaxants were not helping her.  She had constipation due to the pain and developed severe hemorrhoids.  She is seeing another doctor for this.  She started the Medrol Dosepak last Wednesday.  By last Friday, she was doing much better.  She took the Flexeril for only 2-3 days.  She had severe spasms at that time from the right shoulder to her pelvis.  She also had some left low back pain.  She is now 85% better.  She finished the Medrol Dosepak this morning.  This was her worst flare up since she was 23 years old. This was the first time for the severe back spasms.  She denies numbness, tingling, and weakness.  For the first 2 days, she had right buttock and lateral hip pain.  For the first day, she had right leg deep in the posterior thigh and calf.     Past  Medical History   Past Medical History:    Chronic pain of both knees    Formatting of this note might be different from the original. Last Assessment & Plan: She is training for marathon, notes pain in her knees that is interfering with her progress. No associated swelling or joint instability.  Plan:  Referred for PT for further evaluation and management.    Crushing injury of toe of right foot    Esophageal reflux    Eustachian tube dysfunction, bilateral    Lumbar disc herniation    Lumbar radicular pain    Tension-type headache, not intractable    Formatting of this note might be different from the original. Last Assessment & Plan: Off and on headaches for the last 2-3 weeks, predominantly around her temples, without associated visual change, nausea, vomiting, or light sensitivity.  Minimal caffeine intake.  Some increased stress, lost a friend to cancer, going through process of possibly having third child through surrogate.  Training for        Past Surgical History   Past Surgical History:   Procedure Laterality Date          Enlarge breast with implant      Enlarge breast with implant         Family History   History reviewed. No pertinent family history.    Social History   Social History     Socioeconomic History    Marital status:      Spouse name: Not on file    Number of children: Not on file    Years of education: Not on file    Highest education level: Not on file   Occupational History    Not on file   Tobacco Use    Smoking status: Never    Smokeless tobacco: Never    Tobacco comments:     social in college    Vaping Use    Vaping status: Never Used   Substance and Sexual Activity    Alcohol use: Yes     Comment: OCASSIONAL    Drug use: Never    Sexual activity: Yes     Partners: Male     Birth control/protection: Vasectomy   Other Topics Concern    Not on file   Social History Narrative    Not on file     Social Drivers of Health     Financial Resource Strain: Not on file    Food Insecurity: Not on file   Transportation Needs: Not on file   Physical Activity: Not on file   Stress: Not on file   Social Connections: Not on file   Housing Stability: Not on file       PE:  The patient does appear in her stated age in no distress.  The patient is well groomed.    Psychiatric:  The patient is alert and oriented x 3.  The patient has a normal affect and mood.      Respiratory:  No acute respiratory distress. Patient does not have a cough.    HEENT:  Extraocular muscles are intact. There is no kern icterus. Pupils are equal, round, and reactive to light. No redness or discharge bilaterally.    Skin:  There are no rashes or lesions.    Vitals:  There were no vitals filed for this visit.    Gait:    Gait: Normal gait   Sit to Stand: no difficulty      Lumbar Spine:    Scoliosis: Thoracic levoscoliosis that is mild and Lumbar dextroscoliosis that is mild     Lumbar Spine Palpation:    Spinous Processes: Non-tender for all Spinous Processes   Z-joints: Non-tender for all Z-joints   SIJ: Non-tender for bilateral SIJ   Piriformis Muscle: Non-tender bilateral Piriformis muscles   Greater Trochanteric Bursa: Non-tender for bilateral Greater Trochanteric Bursa     Vascular lower extremity:   Dorsalis pedis pulse-RIGHT 2+   Dorsalis pedis pulse-LEFT 2+   Tibialis posterior pulse-RIGHT 2+   Tibialis posterior pulse-LEFT 2+     Neurological Lower Extremity:    Light Touch Sensation: Intact in bilateral Lower Extremities   LE Muscle Strength: All LE strength measurements 5/5 except:  Hamstring RIGHT:   4+/5  Hip Flexion RIGHT:  4/5   RIGHT plantar reflexes: downward response   LEFT plantar reflexes: downward response   Reflexes: 2+ in bilateral lower extremities     Assessment  1. L4-5 right mild herniated disc    2. L5-S1 left mild-mod/right mild, L4-5 right mild foraminal stenosis    3. L5-S1 left mod foraminal & mild-mod diffuse, L4-5 right foraminal & right lateral recess mild-mod, L3-4 mild central  bulging discs    4. Adolescent idiopathic scoliosis of thoracolumbar region    5. Anxiety    6. Asherman's syndrome    7. right L4 and L5-S1 radiculopathy         Plan  She will get back into the PT with Nisreen.    She will get the scoliosis x-rays.    If the PT is not helping after she has done 4 sessions, then she will need to have a new MRI of the lumbar spine.    She was prescribed a Medrol Dosepak incase she has another flare up of the pain.    She will follow up in 3-4 months or sooner if needed.    If the PT is not helping, she might need an MELISSA based on the MRI scan.    The patient understands and agrees with the stated plan.  Triston Aguilar MD  12/2/2024

## 2024-12-03 ENCOUNTER — TELEPHONE (OUTPATIENT)
Dept: PHYSICAL THERAPY | Facility: HOSPITAL | Age: 39
End: 2024-12-03

## 2024-12-03 ENCOUNTER — HOSPITAL ENCOUNTER (OUTPATIENT)
Dept: GENERAL RADIOLOGY | Facility: HOSPITAL | Age: 39
Discharge: HOME | End: 2024-12-03
Attending: PHYSICAL MEDICINE & REHABILITATION
Payer: COMMERCIAL

## 2024-12-03 ENCOUNTER — OFFICE VISIT (OUTPATIENT)
Dept: PHYSICAL THERAPY | Facility: HOSPITAL | Age: 39
End: 2024-12-03
Attending: PHYSICAL MEDICINE & REHABILITATION
Payer: COMMERCIAL

## 2024-12-03 DIAGNOSIS — M48.061 LUMBAR FORAMINAL STENOSIS: ICD-10-CM

## 2024-12-03 DIAGNOSIS — M41.125 ADOLESCENT IDIOPATHIC SCOLIOSIS OF THORACOLUMBAR REGION: ICD-10-CM

## 2024-12-03 DIAGNOSIS — M51.9 LUMBAR DISC DISEASE: ICD-10-CM

## 2024-12-03 DIAGNOSIS — F41.9 ANXIETY: ICD-10-CM

## 2024-12-03 DIAGNOSIS — N85.6 ASHERMAN'S SYNDROME: ICD-10-CM

## 2024-12-03 DIAGNOSIS — M54.16 LUMBAR RADICULOPATHY: ICD-10-CM

## 2024-12-03 DIAGNOSIS — M51.26 LUMBAR HERNIATED DISC: Primary | ICD-10-CM

## 2024-12-03 DIAGNOSIS — M51.26 LUMBAR HERNIATED DISC: ICD-10-CM

## 2024-12-03 PROCEDURE — 72082 X-RAY EXAM ENTIRE SPI 2/3 VW: CPT | Performed by: PHYSICAL MEDICINE & REHABILITATION

## 2024-12-03 PROCEDURE — 97162 PT EVAL MOD COMPLEX 30 MIN: CPT | Performed by: PHYSICAL THERAPIST

## 2024-12-03 PROCEDURE — 97530 THERAPEUTIC ACTIVITIES: CPT | Performed by: PHYSICAL THERAPIST

## 2024-12-03 NOTE — PROGRESS NOTES
LUMBAR SPINE EVALUATION:   Referring Physician: Dr. Aguilar  Diagnosis: L4-5 right mild herniated disc  (primary encounter diagnosis)  L5-S1 left mild-mod/right mild, L4-5 right mild foraminal stenosis  L5-S1 left mod foraminal & mild-mod diffuse, L4-5 right foraminal & right lateral recess mild-mod, L3-4 mild central bulging discs  Adolescent idiopathic scoliosis of thoracolumbar region  Anxiety  Asherman's syndrome  right L4 and L5-S1 radiculopathy     Date of Service: 12/3/2024   Date of Onset: new episode of pain on 11/27/24    PATIENT SUMMARY   Lorri Gunderson is a 39 year old y/o female who presents to therapy today with complaints of LBP.  Reports she has been feeling good but had a recent flare up.  State she got stuck in a lateral shift position.  States she got the point that she was stuck bent over and was stuck in the position and had to lay on the floor for over an hour before she could move.    Reports the R LE and R hip were weak per Dr. Aguilar    States she was lifting a 55 lbs kettle bells recently and felt really good.  States she was feel strong with her workouts so she was frustrated when her back flared up again.    Reports her pain is less than when this episode started but states she very apprehensive to move.    History of current condition: states she has had multiple flare ups in the past . Reports she never felt like she really recovered from her flare up in the summer.      Pain Rating:  Worst 10/10 VAS during spasm,     Current functional limitations include limited ability to bend/lift/twist, limited ability to lift and carry objects, limited ability to workout.   Lorri describes prior level of function independent in all activities.  Worked our regularly at the gym/lifting weights.       Pt goals include decrease pain, return to her workouts..    Past medical history was reviewed with Lorri. Significant findings include   Past Medical History:    Chronic pain of both knees     Formatting of this note might be different from the original. Last Assessment & Plan: She is training for marathon, notes pain in her knees that is interfering with her progress. No associated swelling or joint instability.  Plan:  Referred for PT for further evaluation and management.    Crushing injury of toe of right foot    Esophageal reflux    Eustachian tube dysfunction, bilateral    Lumbar disc herniation    Lumbar radicular pain    Tension-type headache, not intractable    Formatting of this note might be different from the original. Last Assessment & Plan: Off and on headaches for the last 2-3 weeks, predominantly around her temples, without associated visual change, nausea, vomiting, or light sensitivity.  Minimal caffeine intake.  Some increased stress, lost a friend to cancer, going through process of possibly having third child through surrogate.  Training for      Past Surgical History:   Procedure Laterality Date          Enlarge breast with implant      Enlarge breast with implant       .     Are you being hurt, frightened, demeaned, or taken advantage of by anyone at your home or in your life?  No     Have you recently had thoughts of hurting yourself?  No    Have you tried to hurt yourself in the past?  No      ASSESSMENT:   Lorri Gunderson presents back to therapy with a increased episode of LBP without radicular symptoms beyond the glutes but reports significant loss of mobility and inability to move.  The pt intensity has decreased with the use of medications but she displays significant apprehension with movement, (+) SANDRA testing, decreased mobility at the L4L5 and L5S1 segments and increased pain that limits her functional mobility.  Recommend skilled PT 1-2 times per week for up to 10 visits and then re-evulate at that time.  The pt is in agreement with the POC.    Precautions:  Csection,       OBJECTIVE:   Observation/Posture: anterior pelvic tilt but kyphotic position of  the lumbar spine  Gait: bilateral heel toe gait pattern without gross derivation  ROM:     Trunk         Pain (+/-)   Flexion NT                     Extension Decreased 25%    R Sidebend Decreased 25%    L Sidebend Decreased 25%    R Rotation NT    L Rotation NT    R Sideglide WFL    L Sideglide WFL      Repeated Motion Testing: repeated extension in lying - decreased/no change    SANDRA Testing R L   Adduction Drop Test (+) (+)   Extension Drop Test NT NT   SLR 65 75   Trunk Rotation NT NT   Posterior Mediastinum Expansion Test decreased decreased   Standing Reach Test - -   PART + +   PADT + +   Hrusta Adduction Lift Test 2 2       STRENGTH:   5/5 MMT Scale   Left  Right Comments   Hip Flexion (L2) 5 5    Knee Extension (L3) 5 5    Knee Flexion 5 5    Ankle DF (L4) 5 5    EHL (L5) 5 5    Ankle PF (S1) 5 5    Hip Abduction NT NT    Hip Extension NT NT      Flexibility:      R L   Hamstrings long long   Piriformis long WFL   Hip Flexor long long       Palpation: TTP over L4L5 and L5Sq  Neuro Screen: (-) heel walking and toe walking  Accessory Motion: decreased mobility at L5S1  Special Tests: (-) SLR, (-) Slum[    Outcome Surverys  Oswestry Disability Index Score  No data recorded      Today’s Treatment and Response:     12/3/2024  Visit #   1   Manual Therapy    Therapeutic Exercise    Therapeutic Activity Educated in centralization of symptoms and precautions for therapy    Education on long term management strategies for pain    Education on our position and influence the stress on her lumbar discs   Neuromuscular Education    TNE Education    HEP        The pt was educated on the plan of care, purpose and individual goals for therapy, precautions for therapy.  All questions were answered.     Charges: PT Eval x 1, TA1(15)    Total Timed treatment: 23 min      Total Treatment Time: 45 min       In agreement with FOTO score and clinical rationale, this evaluation involved Moderate Complexity decision making due to 3+  personal factors/comorbidities, 4+ body structures involved/activity limitations, and unstable symptoms including changing pain levels and repeated episodes of pain       PLAN OF CARE:    Goals:      The pt was educated on the plan of care, purpose and individual goals for therapy, precautions for therapy.  All questions were answered.     1.  The pt will be independent in their HEP.  2.  Centralization of symptoms to the lumbar spine.  3.  The pt will be independent in a modified workout program.  4.  The pt will be able to return to weight lifting without an increase in pain.  5.  The pt will be able to  an item off the floor without an increase in pain.      Frequency / Duration: Patient will be seen for 1-2 x/week or a total of 10 visits over a 90 day period.  Treatment will include: Manual Therapy; Therapeutic Exercises; Neuromuscular Re-education; Therapeutic Activity;  Patient education: Home exercise program instruction; TNE Education; Modalities as needed.    Education or treatment limitation: None  Rehab Potential good    Patient was advised of these findings, precautions, and treatment options and has agreed to actively participate in planning and for this course of care.    Thank you for your referral. Please co-sign or sign and return this letter via fax as soon as possible to [phone]. If you have any questions, please contact me at Dept: [phone]    Sincerely,  Electronically signed by therapist: MAREK JACINTO, PT    Physician's certification required: Yes  I certify the need for these services furnished under this plan of treatment and while under my care.    X___________________________________________________ Date____________________    Certification From: 12/3/2024  To:3/3/2025

## 2024-12-04 ENCOUNTER — TELEPHONE (OUTPATIENT)
Dept: INTERNAL MEDICINE CLINIC | Facility: CLINIC | Age: 39
End: 2024-12-04

## 2024-12-04 NOTE — TELEPHONE ENCOUNTER
Pharmacy requesting PA      Current Outpatient Medications:       pramoxine perianal 1 % External Foam, Place 1 applicator rectally every 2 (two) hours as needed for Hemorrhoids., Disp: 15 g, Rfl: 0    PATIENT ID   [id number]

## 2024-12-09 ENCOUNTER — OFFICE VISIT (OUTPATIENT)
Dept: PHYSICAL THERAPY | Facility: HOSPITAL | Age: 39
End: 2024-12-09
Attending: PHYSICAL MEDICINE & REHABILITATION
Payer: COMMERCIAL

## 2024-12-09 PROCEDURE — 97140 MANUAL THERAPY 1/> REGIONS: CPT | Performed by: PHYSICAL THERAPIST

## 2024-12-12 ENCOUNTER — OFFICE VISIT (OUTPATIENT)
Dept: PHYSICAL THERAPY | Facility: HOSPITAL | Age: 39
End: 2024-12-12
Attending: PHYSICAL MEDICINE & REHABILITATION
Payer: COMMERCIAL

## 2024-12-12 ENCOUNTER — TELEPHONE (OUTPATIENT)
Dept: INTERNAL MEDICINE CLINIC | Facility: CLINIC | Age: 39
End: 2024-12-12

## 2024-12-12 ENCOUNTER — TELEPHONE (OUTPATIENT)
Dept: PHYSICAL MEDICINE AND REHAB | Facility: CLINIC | Age: 39
End: 2024-12-12

## 2024-12-12 ENCOUNTER — MED REC SCAN ONLY (OUTPATIENT)
Dept: INTERNAL MEDICINE CLINIC | Facility: CLINIC | Age: 39
End: 2024-12-12

## 2024-12-12 PROCEDURE — 97112 NEUROMUSCULAR REEDUCATION: CPT | Performed by: PHYSICAL THERAPIST

## 2024-12-12 PROCEDURE — 97140 MANUAL THERAPY 1/> REGIONS: CPT | Performed by: PHYSICAL THERAPIST

## 2024-12-12 NOTE — TELEPHONE ENCOUNTER
Patient called to confirm with we have her results of her MRI that [facility] just faxed over to  office. Patient said she spoke to someone from [facility] and told patient that we did receive it. Patient would like a call back to confirm since she would like to  results from MRI today. Please advise

## 2024-12-12 NOTE — TELEPHONE ENCOUNTER
Results received. Patient is picking up today at [facility] and states these will be discussed by Dr. Aguilar, do not have to be given to Dr. Dorsey for review - closing TE

## 2024-12-12 NOTE — TELEPHONE ENCOUNTER
Patient said [facility] faxed her results to Dr Magy Dorsey today.  She wants to pick it up.  Call [phone]

## 2024-12-12 NOTE — TELEPHONE ENCOUNTER
I called pt and informed we have not received any results, I provided her with our fax number as she is unsuire which fax number they faxed to - will contact her once we receive them

## 2024-12-19 ENCOUNTER — OFFICE VISIT (OUTPATIENT)
Dept: PHYSICAL THERAPY | Facility: HOSPITAL | Age: 39
End: 2024-12-19
Attending: PHYSICAL MEDICINE & REHABILITATION
Payer: COMMERCIAL

## 2024-12-19 PROCEDURE — 97140 MANUAL THERAPY 1/> REGIONS: CPT | Performed by: PHYSICAL THERAPIST

## 2024-12-19 PROCEDURE — 97112 NEUROMUSCULAR REEDUCATION: CPT | Performed by: PHYSICAL THERAPIST

## 2024-12-19 PROCEDURE — 97530 THERAPEUTIC ACTIVITIES: CPT | Performed by: PHYSICAL THERAPIST

## 2024-12-19 NOTE — PROGRESS NOTES
2024  Dx: L4-5 right mild herniated disc  (primary encounter diagnosis)  L5-S1 left mild-mod/right mild, L4-5 right mild foraminal stenosis  L5-S1 left mod foraminal & mild-mod diffuse, L4-5 right foraminal & right lateral recess mild-mod, L3-4 mild central bulging discs  Adolescent idiopathic scoliosis of thoracolumbar region  Anxiety  Asherman's syndrome  right L4 and L5-S1 radiculopathy                Authorized # of Visits:  10 visits on the POC          Next MD visit: none   Fall Risk: standard         Precautions: scoliosis,   Medication Changes since last visit?: No    Subjective: Reports she continue to have cental LBP.   States she feels like she can't work out.  States she is isn't sure what she should be doing to work out to not flare up her back.    Pain Ratin-5/1- VAS    Objective:      2024  Visit #4   Manual Therapy Bilateral iliopsoas release    L anterior to posterior ileum mob    STM  incision   Therapeutic Exercise    Therapeutic Activity Education on how MFR/visceral mob/dry needling may be beneficial   Neuromuscular Education (+) PEC at the pelvic   TNE Education    HEP          Assessment: No adverse effects to treatment.  The pt displays a neutral position of the chest wall and cervical spine but continues to display (+) ADT/PADT bilaterally.  She displays significant STR's in the abdomin secondary to multiple surgeries/procedures.  May benefit from dry needling in the lumbar spine for central LBP and to pelvic floor therapy for visceral mobilization/internal work to decrease adhesions.      Goals:      The pt was educated on the plan of care, purpose and individual goals for therapy, precautions for therapy.  All questions were answered.     1.  The pt will be independent in their HEP.  2.  Centralization of symptoms to the lumbar spine.  3.  The pt will be independent in a modified workout program.  4.  The pt will be able to return to weight lifting without  an increase in pain.  5.  The pt will be able to  an item off the floor without an increase in pain.      Frequency / Duration: Patient will be seen for 1-2 x/week or a total of 10 visits over a 90 day period.  Treatment will include: Manual Therapy; Therapeutic Exercises; Neuromuscular Re-education; Therapeutic Activity;  Patient education: Home exercise program instruction; TNE Education; Modalities as needed.    Education or treatment limitation: None  Rehab Potential good      Certification From: 12/3/2024  To:3/3/2025      Charges: Man2 (23), TA1(15), NM1(8)       Total Timed Treatment: 46 min  Total Treatment Time: 46 min              FOR REFERENCE ONLY   LUMBAR SPINE EVALUATION:   Referring Physician: Dr. Robbins ref. provider found  Diagnosis: L4-5 right mild herniated disc  (primary encounter diagnosis)  L5-S1 left mild-mod/right mild, L4-5 right mild foraminal stenosis  L5-S1 left mod foraminal & mild-mod diffuse, L4-5 right foraminal & right lateral recess mild-mod, L3-4 mild central bulging discs  Adolescent idiopathic scoliosis of thoracolumbar region  Anxiety  Asherman's syndrome  right L4 and L5-S1 radiculopathy     Date of Service: 12/3/2024   Date of Onset: new episode of pain on 11/27/24    PATIENT SUMMARY   Lorri Gunderson is a 39 year old y/o female who presents to therapy today with complaints of LBP.  Reports she has been feeling good but had a recent flare up.  State she got stuck in a lateral shift position.  States she got the point that she was stuck bent over and was stuck in the position and had to lay on the floor for over an hour before she could move.    Reports the R LE and R hip were weak per Dr. Aguilar    States she was lifting a 55 lbs kettle bells recently and felt really good.  States she was feel strong with her workouts so she was frustrated when her back flared up again.    Reports her pain is less than when this episode started but states she very apprehensive to  move.    History of current condition: states she has had multiple flare ups in the past . Reports she never felt like she really recovered from her flare up in the summer.      Pain Rating:  Worst 10/10 VAS during spasm,     Current functional limitations include limited ability to bend/lift/twist, limited ability to lift and carry objects, limited ability to workout.   Lorri describes prior level of function independent in all activities.  Worked our regularly at the gym/lifting weights.       Pt goals include decrease pain, return to her workouts..    Past medical history was reviewed with Lorri. Significant findings include   Past Medical History:    Chronic pain of both knees    Formatting of this note might be different from the original. Last Assessment & Plan: She is training for marathon, notes pain in her knees that is interfering with her progress. No associated swelling or joint instability.  Plan:  Referred for PT for further evaluation and management.    Crushing injury of toe of right foot    Esophageal reflux    Eustachian tube dysfunction, bilateral    Lumbar disc herniation    Lumbar radicular pain    Tension-type headache, not intractable    Formatting of this note might be different from the original. Last Assessment & Plan: Off and on headaches for the last 2-3 weeks, predominantly around her temples, without associated visual change, nausea, vomiting, or light sensitivity.  Minimal caffeine intake.  Some increased stress, lost a friend to cancer, going through process of possibly having third child through surrogate.  Training for      Past Surgical History:   Procedure Laterality Date          Enlarge breast with implant      Enlarge breast with implant       .     Are you being hurt, frightened, demeaned, or taken advantage of by anyone at your home or in your life?  No     Have you recently had thoughts of hurting yourself?  No    Have you tried to hurt yourself in the  past?  No      ASSESSMENT:   Lorri Gunderson presents back to therapy with a increased episode of LBP without radicular symptoms beyond the glutes but reports significant loss of mobility and inability to move.  The pt intensity has decreased with the use of medications but she displays significant apprehension with movement, (+) SANDRA testing, decreased mobility at the L4L5 and L5S1 segments and increased pain that limits her functional mobility.  Recommend skilled PT 1-2 times per week for up to 10 visits and then re-evulate at that time.  The pt is in agreement with the POC.    Precautions:  Csection,       OBJECTIVE:   Observation/Posture: anterior pelvic tilt but kyphotic position of the lumbar spine  Gait: bilateral heel toe gait pattern without gross derivation  ROM:     Trunk         Pain (+/-)   Flexion NT                     Extension Decreased 25%    R Sidebend Decreased 25%    L Sidebend Decreased 25%    R Rotation NT    L Rotation NT    R Sideglide WFL    L Sideglide WFL      Repeated Motion Testing: repeated extension in lying - decreased/no change    SANDRA Testing R L   Adduction Drop Test (+) (+)   Extension Drop Test NT NT   SLR 65 75   Trunk Rotation NT NT   Posterior Mediastinum Expansion Test decreased decreased   Standing Reach Test - -   PART + +   PADT + +   Hrusta Adduction Lift Test 2 2       STRENGTH:   5/5 MMT Scale   Left  Right Comments   Hip Flexion (L2) 5 5    Knee Extension (L3) 5 5    Knee Flexion 5 5    Ankle DF (L4) 5 5    EHL (L5) 5 5    Ankle PF (S1) 5 5    Hip Abduction NT NT    Hip Extension NT NT      Flexibility:      R L   Hamstrings long long   Piriformis long WFL   Hip Flexor long long       Palpation: TTP over L4L5 and L5Sq  Neuro Screen: (-) heel walking and toe walking  Accessory Motion: decreased mobility at L5S1  Special Tests: (-) SLR, (-) Slum[    Outcome Surverys  Oswestry Disability Index Score  No data recorded      Today’s Treatment and Response:      12/3/2024  Visit #   1   Manual Therapy    Therapeutic Exercise    Therapeutic Activity Educated in centralization of symptoms and precautions for therapy    Education on long term management strategies for pain    Education on our position and influence the stress on her lumbar discs   Neuromuscular Education    TNE Education    HEP              Goals:      The pt was educated on the plan of care, purpose and individual goals for therapy, precautions for therapy.  All questions were answered.     1.  The pt will be independent in their HEP.  2.  Centralization of symptoms to the lumbar spine.  3.  The pt will be independent in a modified workout program.  4.  The pt will be able to return to weight lifting without an increase in pain.  5.  The pt will be able to  an item off the floor without an increase in pain.      Frequency / Duration: Patient will be seen for 1-2 x/week or a total of 10 visits over a 90 day period.  Treatment will include: Manual Therapy; Therapeutic Exercises; Neuromuscular Re-education; Therapeutic Activity;  Patient education: Home exercise program instruction; TNE Education; Modalities as needed.    Education or treatment limitation: None  Rehab Potential good      Certification From: 12/3/2024  To:3/3/2025

## 2024-12-19 NOTE — PROGRESS NOTES
2024    Dx: L4-5 right mild herniated disc  (primary encounter diagnosis)  L5-S1 left mild-mod/right mild, L4-5 right mild foraminal stenosis  L5-S1 left mod foraminal & mild-mod diffuse, L4-5 right foraminal & right lateral recess mild-mod, L3-4 mild central bulging discs  Adolescent idiopathic scoliosis of thoracolumbar region  Anxiety  Asherman's syndrome  right L4 and L5-S1 radiculopathy               Authorized # of Visits:  10 visits on POC          Next MD visit: none   Fall Risk: standard         Precautions:  scoliosis,   Medication Changes since last visit?: No    Subjective: States she still has centralized LBP that isn't as intense but continues to bother her.    Pain Ratin/10 VAS    Objective:      12/3/2024  Visit #   1 2024  Visit #2 2024  Visit #3   Manual Therapy  STM bilateral glute and lumbar paraspinals    Bilateral iliopsoas release    Anterior posterior ilium mobs L STM bilateral glute and lumbar paraspinals    Bilateral iliopsoas release    Anterior posterior ilium mobs L   Therapeutic Exercise      Therapeutic Activity Educated in centralization of symptoms and precautions for therapy    Education on long term management strategies for pain    Education on our position and influence the stress on her lumbar discs     Neuromuscular Education   R SL, adductor pull backs    L SL knee towards knee   TNE Education      HEP          Assessment: No adverse effects to treatment.  Trial of frontal plane activities but was unable tog et her pelvic netural    Plan:  Progress back to SANDRA activities as tolerated.    Goals:      The pt was educated on the plan of care, purpose and individual goals for therapy, precautions for therapy.  All questions were answered.     1.  The pt will be independent in their HEP.  2.  Centralization of symptoms to the lumbar spine.  3.  The pt will be independent in a modified workout program.  4.  The pt will be able to return to weight  lifting without an increase in pain.  5.  The pt will be able to  an item off the floor without an increase in pain.      Frequency / Duration: Patient will be seen for 1-2 x/week or a total of 10 visits over a 90 day period.  Treatment will include: Manual Therapy; Therapeutic Exercises; Neuromuscular Re-education; Therapeutic Activity;  Patient education: Home exercise program instruction; TNE Education; Modalities as needed.    Education or treatment limitation: None  Rehab Potential good    Certification From: 12/3/2024  To:3/3/2025          Charges: Man 2 (23), NM1(15)     Total Timed Treatment: 38 min  Total Treatment Time: 38 min            For Reference Only   LUMBAR SPINE EVALUATION:   Referring Physician: Dr. Robbins ref. provider found  Diagnosis: L4-5 right mild herniated disc  (primary encounter diagnosis)  L5-S1 left mild-mod/right mild, L4-5 right mild foraminal stenosis  L5-S1 left mod foraminal & mild-mod diffuse, L4-5 right foraminal & right lateral recess mild-mod, L3-4 mild central bulging discs  Adolescent idiopathic scoliosis of thoracolumbar region  Anxiety  Asherman's syndrome  right L4 and L5-S1 radiculopathy     Date of Service: 12/3/2024   Date of Onset: new episode of pain on 11/27/24    PATIENT SUMMARY   Lorri Gunderson is a 39 year old y/o female who presents to therapy today with complaints of LBP.  Reports she has been feeling good but had a recent flare up.  State she got stuck in a lateral shift position.  States she got the point that she was stuck bent over and was stuck in the position and had to lay on the floor for over an hour before she could move.    Reports the R LE and R hip were weak per Dr. Aguilar    States she was lifting a 55 lbs kettle bells recently and felt really good.  States she was feel strong with her workouts so she was frustrated when her back flared up again.    Reports her pain is less than when this episode started but states she very apprehensive  to move.    History of current condition: states she has had multiple flare ups in the past . Reports she never felt like she really recovered from her flare up in the summer.      Pain Rating:  Worst 10/10 VAS during spasm,     Current functional limitations include limited ability to bend/lift/twist, limited ability to lift and carry objects, limited ability to workout.   Lorri describes prior level of function independent in all activities.  Worked our regularly at the gym/lifting weights.       Pt goals include decrease pain, return to her workouts..    Past medical history was reviewed with Lorri. Significant findings include   Past Medical History:    Chronic pain of both knees    Formatting of this note might be different from the original. Last Assessment & Plan: She is training for marathon, notes pain in her knees that is interfering with her progress. No associated swelling or joint instability.  Plan:  Referred for PT for further evaluation and management.    Crushing injury of toe of right foot    Esophageal reflux    Eustachian tube dysfunction, bilateral    Lumbar disc herniation    Lumbar radicular pain    Tension-type headache, not intractable    Formatting of this note might be different from the original. Last Assessment & Plan: Off and on headaches for the last 2-3 weeks, predominantly around her temples, without associated visual change, nausea, vomiting, or light sensitivity.  Minimal caffeine intake.  Some increased stress, lost a friend to cancer, going through process of possibly having third child through surrogate.  Training for      Past Surgical History:   Procedure Laterality Date          Enlarge breast with implant      Enlarge breast with implant       .     Are you being hurt, frightened, demeaned, or taken advantage of by anyone at your home or in your life?  No     Have you recently had thoughts of hurting yourself?  No    Have you tried to hurt yourself in the  past?  No      ASSESSMENT:   Lorri Gunderson presents back to therapy with a increased episode of LBP without radicular symptoms beyond the glutes but reports significant loss of mobility and inability to move.  The pt intensity has decreased with the use of medications but she displays significant apprehension with movement, (+) SANDRA testing, decreased mobility at the L4L5 and L5S1 segments and increased pain that limits her functional mobility.  Recommend skilled PT 1-2 times per week for up to 10 visits and then re-evulate at that time.  The pt is in agreement with the POC.    Precautions:  Csection,       OBJECTIVE:   Observation/Posture: anterior pelvic tilt but kyphotic position of the lumbar spine  Gait: bilateral heel toe gait pattern without gross derivation  ROM:     Trunk         Pain (+/-)   Flexion NT                     Extension Decreased 25%    R Sidebend Decreased 25%    L Sidebend Decreased 25%    R Rotation NT    L Rotation NT    R Sideglide WFL    L Sideglide WFL      Repeated Motion Testing: repeated extension in lying - decreased/no change    SANDRA Testing R L   Adduction Drop Test (+) (+)   Extension Drop Test NT NT   SLR 65 75   Trunk Rotation NT NT   Posterior Mediastinum Expansion Test decreased decreased   Standing Reach Test - -   PART + +   PADT + +   Hrusta Adduction Lift Test 2 2       STRENGTH:   5/5 MMT Scale   Left  Right Comments   Hip Flexion (L2) 5 5    Knee Extension (L3) 5 5    Knee Flexion 5 5    Ankle DF (L4) 5 5    EHL (L5) 5 5    Ankle PF (S1) 5 5    Hip Abduction NT NT    Hip Extension NT NT      Flexibility:      R L   Hamstrings long long   Piriformis long WFL   Hip Flexor long long       Palpation: TTP over L4L5 and L5Sq  Neuro Screen: (-) heel walking and toe walking  Accessory Motion: decreased mobility at L5S1  Special Tests: (-) SLR, (-) Slum[    Outcome Surverys  Oswestry Disability Index Score  No data recorded

## 2024-12-19 NOTE — PROGRESS NOTES
2024  Dx: L4-5 right mild herniated disc  (primary encounter diagnosis)  L5-S1 left mild-mod/right mild, L4-5 right mild foraminal stenosis  L5-S1 left mod foraminal & mild-mod diffuse, L4-5 right foraminal & right lateral recess mild-mod, L3-4 mild central bulging discs  Adolescent idiopathic scoliosis of thoracolumbar region  Anxiety  Asherman's syndrome  right L4 and L5-S1 radiculopathy               Authorized # of Visits:  10 visits on POC          Next MD visit: none   Fall Risk: standard         Precautions:  scoliosis,   Medication Changes since last visit?: No    Subjective: States she still has centralized LBP that isn't as intense but continues to bother her.    Pain Ratin/10 VAS    Objective:      12/3/2024  Visit #   1 2024  Visit #2   Manual Therapy  STM bilateral glute and lumbar paraspinals    Bilateral iliopsoas release    Anterior posterior ilium mobs L   Therapeutic Exercise     Therapeutic Activity Educated in centralization of symptoms and precautions for therapy    Education on long term management strategies for pain    Education on our position and influence the stress on her lumbar discs    Neuromuscular Education     TNE Education     HEP         Assessment: No adverse effects to treatment.  Focused on manual therapy this date to help relief symptoms.  The pt reported the most relief from the iliopsoas release. Note anteriorly rotated ilium L which responded well to manual therapy.    Plan:  Progress back to SANDRA activities as tolerated.    Goals:      The pt was educated on the plan of care, purpose and individual goals for therapy, precautions for therapy.  All questions were answered.     1.  The pt will be independent in their HEP.  2.  Centralization of symptoms to the lumbar spine.  3.  The pt will be independent in a modified workout program.  4.  The pt will be able to return to weight lifting without an increase in pain.  5.  The pt will be able to  an  item off the floor without an increase in pain.      Frequency / Duration: Patient will be seen for 1-2 x/week or a total of 10 visits over a 90 day period.  Treatment will include: Manual Therapy; Therapeutic Exercises; Neuromuscular Re-education; Therapeutic Activity;  Patient education: Home exercise program instruction; TNE Education; Modalities as needed.    Education or treatment limitation: None  Rehab Potential good    Certification From: 12/3/2024  To:3/3/2025          Charges: Man 3 (38)       Total Timed Treatment: 38 min  Total Treatment Time: 38 min            For Reference Only   LUMBAR SPINE EVALUATION:   Referring Physician: Dr. Aguilar  Diagnosis: L4-5 right mild herniated disc  (primary encounter diagnosis)  L5-S1 left mild-mod/right mild, L4-5 right mild foraminal stenosis  L5-S1 left mod foraminal & mild-mod diffuse, L4-5 right foraminal & right lateral recess mild-mod, L3-4 mild central bulging discs  Adolescent idiopathic scoliosis of thoracolumbar region  Anxiety  Asherman's syndrome  right L4 and L5-S1 radiculopathy     Date of Service: 12/3/2024   Date of Onset: new episode of pain on 11/27/24    PATIENT SUMMARY   Lorri Gunderson is a 39 year old y/o female who presents to therapy today with complaints of LBP.  Reports she has been feeling good but had a recent flare up.  State she got stuck in a lateral shift position.  States she got the point that she was stuck bent over and was stuck in the position and had to lay on the floor for over an hour before she could move.    Reports the R LE and R hip were weak per Dr. Aguilar    States she was lifting a 55 lbs kettle bells recently and felt really good.  States she was feel strong with her workouts so she was frustrated when her back flared up again.    Reports her pain is less than when this episode started but states she very apprehensive to move.    History of current condition: states she has had multiple flare ups in the past .  Reports she never felt like she really recovered from her flare up in the summer.      Pain Rating:  Worst 10/10 VAS during spasm,     Current functional limitations include limited ability to bend/lift/twist, limited ability to lift and carry objects, limited ability to workout.   Lorri describes prior level of function independent in all activities.  Worked our regularly at the gym/lifting weights.       Pt goals include decrease pain, return to her workouts..    Past medical history was reviewed with Lorri. Significant findings include   Past Medical History:    Chronic pain of both knees    Formatting of this note might be different from the original. Last Assessment & Plan: She is training for marathon, notes pain in her knees that is interfering with her progress. No associated swelling or joint instability.  Plan:  Referred for PT for further evaluation and management.    Crushing injury of toe of right foot    Esophageal reflux    Eustachian tube dysfunction, bilateral    Lumbar disc herniation    Lumbar radicular pain    Tension-type headache, not intractable    Formatting of this note might be different from the original. Last Assessment & Plan: Off and on headaches for the last 2-3 weeks, predominantly around her temples, without associated visual change, nausea, vomiting, or light sensitivity.  Minimal caffeine intake.  Some increased stress, lost a friend to cancer, going through process of possibly having third child through surrogate.  Training for      Past Surgical History:   Procedure Laterality Date          Enlarge breast with implant      Enlarge breast with implant       .     Are you being hurt, frightened, demeaned, or taken advantage of by anyone at your home or in your life?  No     Have you recently had thoughts of hurting yourself?  No    Have you tried to hurt yourself in the past?  No      ASSESSMENT:   Lorri Gunderson presents back to therapy with a increased  episode of LBP without radicular symptoms beyond the glutes but reports significant loss of mobility and inability to move.  The pt intensity has decreased with the use of medications but she displays significant apprehension with movement, (+) SANDRA testing, decreased mobility at the L4L5 and L5S1 segments and increased pain that limits her functional mobility.  Recommend skilled PT 1-2 times per week for up to 10 visits and then re-evulate at that time.  The pt is in agreement with the POC.    Precautions:  Csection,       OBJECTIVE:   Observation/Posture: anterior pelvic tilt but kyphotic position of the lumbar spine  Gait: bilateral heel toe gait pattern without gross derivation  ROM:     Trunk         Pain (+/-)   Flexion NT                     Extension Decreased 25%    R Sidebend Decreased 25%    L Sidebend Decreased 25%    R Rotation NT    L Rotation NT    R Sideglide WFL    L Sideglide WFL      Repeated Motion Testing: repeated extension in lying - decreased/no change    SNADRA Testing R L   Adduction Drop Test (+) (+)   Extension Drop Test NT NT   SLR 65 75   Trunk Rotation NT NT   Posterior Mediastinum Expansion Test decreased decreased   Standing Reach Test - -   PART + +   PADT + +   Hrusta Adduction Lift Test 2 2       STRENGTH:   5/5 MMT Scale   Left  Right Comments   Hip Flexion (L2) 5 5    Knee Extension (L3) 5 5    Knee Flexion 5 5    Ankle DF (L4) 5 5    EHL (L5) 5 5    Ankle PF (S1) 5 5    Hip Abduction NT NT    Hip Extension NT NT      Flexibility:      R L   Hamstrings long long   Piriformis long WFL   Hip Flexor long long       Palpation: TTP over L4L5 and L5Sq  Neuro Screen: (-) heel walking and toe walking  Accessory Motion: decreased mobility at L5S1  Special Tests: (-) SLR, (-) Slum[    Outcome Surverys  Oswestry Disability Index Score  No data recorded

## 2024-12-23 ENCOUNTER — OFFICE VISIT (OUTPATIENT)
Dept: PHYSICAL THERAPY | Facility: HOSPITAL | Age: 39
End: 2024-12-23
Attending: PHYSICAL MEDICINE & REHABILITATION
Payer: COMMERCIAL

## 2024-12-23 PROCEDURE — 97140 MANUAL THERAPY 1/> REGIONS: CPT | Performed by: PHYSICAL THERAPIST

## 2024-12-23 PROCEDURE — 97530 THERAPEUTIC ACTIVITIES: CPT | Performed by: PHYSICAL THERAPIST

## 2024-12-23 NOTE — PROGRESS NOTES
2024    Dx: L4-5 right mild herniated disc  (primary encounter diagnosis)  L5-S1 left mild-mod/right mild, L4-5 right mild foraminal stenosis  L5-S1 left mod foraminal & mild-mod diffuse, L4-5 right foraminal & right lateral recess mild-mod, L3-4 mild central bulging discs  Adolescent idiopathic scoliosis of thoracolumbar region  Anxiety  Asherman's syndrome  right L4 and L5-S1 radiculopathy                Authorized # of Visits:  10 visits on the POC          Next MD visit: none   Fall Risk: standard         Precautions: scoliosis,   Medication Changes since last visit?: No    Subjective: Reports she saw Dr. Elias who told her she is a not a candidate for surgery right now.  May try massage therapy and is scheduled for dry needling in Feb.  Feels better after manual therapy and the iliopsoas release.  Doing some hip mobility work at home with a 3 minute video.    Pain Ratin-5/1- VAS    Objective:      2024  Visit #4 2024  Visit #5   Manual Therapy Bilateral iliopsoas release    L anterior to posterior ileum mob    STM  incision Bilateral iliopsoas release    L anterior to posterior ileum mob    Cental PA's - L5S1    Unilateral PA's L4L5 and L5S1 billatearlly   Therapeutic Exercise     Therapeutic Activity Education on how MFR/visceral mob/dry needling may be beneficial Education on how alignment/position can affect her pain  and put strain on the lumbar spine discs   Neuromuscular Education (+) PEC at the pelvic    TNE Education     HEP           Assessment: No adverse effects to treatment.  The pt reported pain relief from the session.  She was TTP of the L iliacus and centrally with PA's at the L5S1 segment.  Will attempt more SANDRA work at the next visit.      Goals:      The pt was educated on the plan of care, purpose and individual goals for therapy, precautions for therapy.  All questions were answered.     1.  The pt will be independent in their HEP.  2.  Centralization  of symptoms to the lumbar spine.  3.  The pt will be independent in a modified workout program.  4.  The pt will be able to return to weight lifting without an increase in pain.  5.  The pt will be able to  an item off the floor without an increase in pain.      Frequency / Duration: Patient will be seen for 1-2 x/week or a total of 10 visits over a 90 day period.  Treatment will include: Manual Therapy; Therapeutic Exercises; Neuromuscular Re-education; Therapeutic Activity;  Patient education: Home exercise program instruction; TNE Education; Modalities as needed.    Education or treatment limitation: None  Rehab Potential good      Certification From: 12/3/2024  To:3/3/2025      Charges: Man 3  (38), TA1(8)   Total Timed Treatment: 46 min  Total Treatment Time: 46 min              FOR REFERENCE ONLY   LUMBAR SPINE EVALUATION:   Referring Physician: Dr. Robbins ref. provider found  Diagnosis: L4-5 right mild herniated disc  (primary encounter diagnosis)  L5-S1 left mild-mod/right mild, L4-5 right mild foraminal stenosis  L5-S1 left mod foraminal & mild-mod diffuse, L4-5 right foraminal & right lateral recess mild-mod, L3-4 mild central bulging discs  Adolescent idiopathic scoliosis of thoracolumbar region  Anxiety  Asherman's syndrome  right L4 and L5-S1 radiculopathy     Date of Service: 12/3/2024   Date of Onset: new episode of pain on 11/27/24    PATIENT SUMMARY   Lorri Gunderson is a 39 year old y/o female who presents to therapy today with complaints of LBP.  Reports she has been feeling good but had a recent flare up.  State she got stuck in a lateral shift position.  States she got the point that she was stuck bent over and was stuck in the position and had to lay on the floor for over an hour before she could move.    Reports the R LE and R hip were weak per Dr. Aguilar    States she was lifting a 55 lbs kettle bells recently and felt really good.  States she was feel strong with her workouts so  she was frustrated when her back flared up again.    Reports her pain is less than when this episode started but states she very apprehensive to move.    History of current condition: states she has had multiple flare ups in the past . Reports she never felt like she really recovered from her flare up in the summer.      Pain Rating:  Worst 10/10 VAS during spasm,     Current functional limitations include limited ability to bend/lift/twist, limited ability to lift and carry objects, limited ability to workout.   Lorri describes prior level of function independent in all activities.  Worked our regularly at the gym/lifting weights.       Pt goals include decrease pain, return to her workouts..    Past medical history was reviewed with Lorri. Significant findings include   Past Medical History:    Chronic pain of both knees    Formatting of this note might be different from the original. Last Assessment & Plan: She is training for marathon, notes pain in her knees that is interfering with her progress. No associated swelling or joint instability.  Plan:  Referred for PT for further evaluation and management.    Crushing injury of toe of right foot    Esophageal reflux    Eustachian tube dysfunction, bilateral    Lumbar disc herniation    Lumbar radicular pain    Tension-type headache, not intractable    Formatting of this note might be different from the original. Last Assessment & Plan: Off and on headaches for the last 2-3 weeks, predominantly around her temples, without associated visual change, nausea, vomiting, or light sensitivity.  Minimal caffeine intake.  Some increased stress, lost a friend to cancer, going through process of possibly having third child through surrogate.  Training for      Past Surgical History:   Procedure Laterality Date          Enlarge breast with implant      Enlarge breast with implant       .     Are you being hurt, frightened, demeaned, or taken advantage of by  anyone at your home or in your life?  No     Have you recently had thoughts of hurting yourself?  No    Have you tried to hurt yourself in the past?  No      ASSESSMENT:   Lorri Gunderson presents back to therapy with a increased episode of LBP without radicular symptoms beyond the glutes but reports significant loss of mobility and inability to move.  The pt intensity has decreased with the use of medications but she displays significant apprehension with movement, (+) SANDRA testing, decreased mobility at the L4L5 and L5S1 segments and increased pain that limits her functional mobility.  Recommend skilled PT 1-2 times per week for up to 10 visits and then re-evulate at that time.  The pt is in agreement with the POC.    Precautions:  Csection,       OBJECTIVE:   Observation/Posture: anterior pelvic tilt but kyphotic position of the lumbar spine  Gait: bilateral heel toe gait pattern without gross derivation  ROM:     Trunk         Pain (+/-)   Flexion NT                     Extension Decreased 25%    R Sidebend Decreased 25%    L Sidebend Decreased 25%    R Rotation NT    L Rotation NT    R Sideglide WFL    L Sideglide WFL      Repeated Motion Testing: repeated extension in lying - decreased/no change    SANDRA Testing R L   Adduction Drop Test (+) (+)   Extension Drop Test NT NT   SLR 65 75   Trunk Rotation NT NT   Posterior Mediastinum Expansion Test decreased decreased   Standing Reach Test - -   PART + +   PADT + +   Hrusta Adduction Lift Test 2 2       STRENGTH:   5/5 MMT Scale   Left  Right Comments   Hip Flexion (L2) 5 5    Knee Extension (L3) 5 5    Knee Flexion 5 5    Ankle DF (L4) 5 5    EHL (L5) 5 5    Ankle PF (S1) 5 5    Hip Abduction NT NT    Hip Extension NT NT      Flexibility:      R L   Hamstrings long long   Piriformis long WFL   Hip Flexor long long       Palpation: TTP over L4L5 and L5Sq  Neuro Screen: (-) heel walking and toe walking  Accessory Motion: decreased mobility at L5S1  Special Tests:  (-) SLR, (-) Slum[    Outcome Surverys  Oswestry Disability Index Score  No data recorded      Today’s Treatment and Response:     12/3/2024  Visit #   1   Manual Therapy    Therapeutic Exercise    Therapeutic Activity Educated in centralization of symptoms and precautions for therapy    Education on long term management strategies for pain    Education on our position and influence the stress on her lumbar discs   Neuromuscular Education    TNE Education    HEP              Goals:      The pt was educated on the plan of care, purpose and individual goals for therapy, precautions for therapy.  All questions were answered.     1.  The pt will be independent in their HEP.  2.  Centralization of symptoms to the lumbar spine.  3.  The pt will be independent in a modified workout program.  4.  The pt will be able to return to weight lifting without an increase in pain.  5.  The pt will be able to  an item off the floor without an increase in pain.      Frequency / Duration: Patient will be seen for 1-2 x/week or a total of 10 visits over a 90 day period.  Treatment will include: Manual Therapy; Therapeutic Exercises; Neuromuscular Re-education; Therapeutic Activity;  Patient education: Home exercise program instruction; TNE Education; Modalities as needed.    Education or treatment limitation: None  Rehab Potential good      Certification From: 12/3/2024  To:3/3/2025

## 2024-12-30 ENCOUNTER — TELEPHONE (OUTPATIENT)
Dept: PHYSICAL THERAPY | Facility: HOSPITAL | Age: 39
End: 2024-12-30

## 2025-01-03 ENCOUNTER — OFFICE VISIT (OUTPATIENT)
Dept: PHYSICAL THERAPY | Facility: HOSPITAL | Age: 40
End: 2025-01-03
Attending: PHYSICAL MEDICINE & REHABILITATION
Payer: COMMERCIAL

## 2025-01-03 PROCEDURE — 97530 THERAPEUTIC ACTIVITIES: CPT | Performed by: PHYSICAL THERAPIST

## 2025-01-03 PROCEDURE — 97112 NEUROMUSCULAR REEDUCATION: CPT | Performed by: PHYSICAL THERAPIST

## 2025-01-03 NOTE — PROGRESS NOTES
1/3/2025    Dx: L4-5 right mild herniated disc  (primary encounter diagnosis)  L5-S1 left mild-mod/right mild, L4-5 right mild foraminal stenosis  L5-S1 left mod foraminal & mild-mod diffuse, L4-5 right foraminal & right lateral recess mild-mod, L3-4 mild central bulging discs  Adolescent idiopathic scoliosis of thoracolumbar region  Anxiety  Asherman's syndrome  right L4 and L5-S1 radiculopathy                Authorized # of Visits:  10 visits on the POC          Next MD visit: none   Fall Risk: standard         Precautions: scoliosis,   Medication Changes since last visit?: No    Subjective: Reports she drove to [place] in the rain.  States it was very stressful.  States she had some sciatic pain down the L LE on the drive home.  Reports she was able to walk out this morning with her mobility routine.  States she is going to go horse back riding today but will wear her back brace.    Reports she is very stiff in the morning and she continues to have pain during her day.  States the pain is not as intense but always presents.  Also reports increased stiffness and discomfort in her upper body more than she use to have.  Going to go back to [place] for strengthening and [place] once a week.      Pain Ratin-5/1- VAS    Objective:      2024  Visit #4 2024  Visit #5 1/3/2025  Visit #6   Manual Therapy Bilateral iliopsoas release    L anterior to posterior ileum mob    STM  incision Bilateral iliopsoas release    L anterior to posterior ileum mob    Cental PA's - L5S1    Unilateral PA's L4L5 and L5S1 billatearlly    Therapeutic Exercise      Therapeutic Activity Education on how MFR/visceral mob/dry needling may be beneficial Education on how alignment/position can affect her pain  and put strain on the lumbar spine discs -education on how alignment can influence strain patterns on the disc    - education to do her HEP before and after workouts   Neuromuscular Education (+) PEC at the pelvic   90/90 reposition with L hip shift    R SL, respiratory adductor pull backs    R SL, L gut med    L SL, resisted R glut max    L SL, knee towards knee   TNE Education      HEP   90/90 reposition with L hip shift    R SL, respiratory adductor pull backs    R SL, L gut med    L SL, resisted R glut max    L SL, knee towards knee         Assessment: No adverse effects to treatment. The pt displays (+) bilateral ADT/PART this date.  Was able to get neutral with the above stated activities..  Had difficulty finding and feeling the L glut med this date and note increased fatigue in the R glut max but was able to find and feel the L hamstring and L adductor.  Given an updated HEP for before and after workouts and at least once a day.    Goals:      The pt was educated on the plan of care, purpose and individual goals for therapy, precautions for therapy.  All questions were answered.     1.  The pt will be independent in their HEP.  2.  Centralization of symptoms to the lumbar spine.  3.  The pt will be independent in a modified workout program.  4.  The pt will be able to return to weight lifting without an increase in pain.  5.  The pt will be able to  an item off the floor without an increase in pain.      Frequency / Duration: Patient will be seen for 1-2 x/week or a total of 10 visits over a 90 day period.  Treatment will include: Manual Therapy; Therapeutic Exercises; Neuromuscular Re-education; Therapeutic Activity;  Patient education: Home exercise program instruction; TNE Education; Modalities as needed.    Education or treatment limitation: None  Rehab Potential good      Certification From: 12/3/2024  To:3/3/2025      Charges: NM3  (38), TA1(8)   Total Timed Treatment: 46 min  Total Treatment Time: 46 min              FOR REFERENCE ONLY   LUMBAR SPINE EVALUATION:   Referring Physician: Dr. Robbins ref. provider found  Diagnosis: L4-5 right mild herniated disc  (primary encounter diagnosis)  L5-S1 left  mild-mod/right mild, L4-5 right mild foraminal stenosis  L5-S1 left mod foraminal & mild-mod diffuse, L4-5 right foraminal & right lateral recess mild-mod, L3-4 mild central bulging discs  Adolescent idiopathic scoliosis of thoracolumbar region  Anxiety  Asherman's syndrome  right L4 and L5-S1 radiculopathy     Date of Service: 12/3/2024   Date of Onset: new episode of pain on 11/27/24    PATIENT SUMMARY   Lorri Gunderson is a 39 year old y/o female who presents to therapy today with complaints of LBP.  Reports she has been feeling good but had a recent flare up.  State she got stuck in a lateral shift position.  States she got the point that she was stuck bent over and was stuck in the position and had to lay on the floor for over an hour before she could move.    Reports the R LE and R hip were weak per Dr. Aguilar    States she was lifting a 55 lbs kettle bells recently and felt really good.  States she was feel strong with her workouts so she was frustrated when her back flared up again.    Reports her pain is less than when this episode started but states she very apprehensive to move.    History of current condition: states she has had multiple flare ups in the past . Reports she never felt like she really recovered from her flare up in the summer.      Pain Rating:  Worst 10/10 VAS during spasm,     Current functional limitations include limited ability to bend/lift/twist, limited ability to lift and carry objects, limited ability to workout.   Lorri describes prior level of function independent in all activities.  Worked our regularly at the gym/lifting weights.       Pt goals include decrease pain, return to her workouts..    Past medical history was reviewed with Lorri. Significant findings include   Past Medical History:    Chronic pain of both knees    Formatting of this note might be different from the original. Last Assessment & Plan: She is training for marathon, notes pain in her knees that  is interfering with her progress. No associated swelling or joint instability.  Plan:  Referred for PT for further evaluation and management.    Crushing injury of toe of right foot    Esophageal reflux    Eustachian tube dysfunction, bilateral    Lumbar disc herniation    Lumbar radicular pain    Tension-type headache, not intractable    Formatting of this note might be different from the original. Last Assessment & Plan: Off and on headaches for the last 2-3 weeks, predominantly around her temples, without associated visual change, nausea, vomiting, or light sensitivity.  Minimal caffeine intake.  Some increased stress, lost a friend to cancer, going through process of possibly having third child through surrogate.  Training for      Past Surgical History:   Procedure Laterality Date          Enlarge breast with implant      Enlarge breast with implant       .     Are you being hurt, frightened, demeaned, or taken advantage of by anyone at your home or in your life?  No     Have you recently had thoughts of hurting yourself?  No    Have you tried to hurt yourself in the past?  No      ASSESSMENT:   Lorri Gunderson presents back to therapy with a increased episode of LBP without radicular symptoms beyond the glutes but reports significant loss of mobility and inability to move.  The pt intensity has decreased with the use of medications but she displays significant apprehension with movement, (+) SANDRA testing, decreased mobility at the L4L5 and L5S1 segments and increased pain that limits her functional mobility.  Recommend skilled PT 1-2 times per week for up to 10 visits and then re-evulate at that time.  The pt is in agreement with the POC.    Precautions:  Csection,       OBJECTIVE:   Observation/Posture: anterior pelvic tilt but kyphotic position of the lumbar spine  Gait: bilateral heel toe gait pattern without gross derivation  ROM:     Trunk         Pain (+/-)   Flexion NT                      Extension Decreased 25%    R Sidebend Decreased 25%    L Sidebend Decreased 25%    R Rotation NT    L Rotation NT    R Sideglide WFL    L Sideglide WFL      Repeated Motion Testing: repeated extension in lying - decreased/no change    SANDRA Testing R L   Adduction Drop Test (+) (+)   Extension Drop Test NT NT   SLR 65 75   Trunk Rotation NT NT   Posterior Mediastinum Expansion Test decreased decreased   Standing Reach Test - -   PART + +   PADT + +   [place] Adduction Lift Test 2 2       STRENGTH:   5/5 MMT Scale   Left  Right Comments   Hip Flexion (L2) 5 5    Knee Extension (L3) 5 5    Knee Flexion 5 5    Ankle DF (L4) 5 5    EHL (L5) 5 5    Ankle PF (S1) 5 5    Hip Abduction NT NT    Hip Extension NT NT      Flexibility:      R L   Hamstrings long long   Piriformis long WFL   Hip Flexor long long       Palpation: TTP over L4L5 and L5Sq  Neuro Screen: (-) heel walking and toe walking  Accessory Motion: decreased mobility at L5S1  Special Tests: (-) SLR, (-) Slum[    Outcome Surverys  Oswestry Disability Index Score  No data recorded      Today’s Treatment and Response:     12/3/2024  Visit #   1   Manual Therapy    Therapeutic Exercise    Therapeutic Activity Educated in centralization of symptoms and precautions for therapy    Education on long term management strategies for pain    Education on our position and influence the stress on her lumbar discs   Neuromuscular Education    TNE Education    HEP              Goals:      The pt was educated on the plan of care, purpose and individual goals for therapy, precautions for therapy.  All questions were answered.     1.  The pt will be independent in their HEP.  2.  Centralization of symptoms to the lumbar spine.  3.  The pt will be independent in a modified workout program.  4.  The pt will be able to return to weight lifting without an increase in pain.  5.  The pt will be able to  an item off the floor without an increase in pain.      Frequency /  Duration: Patient will be seen for 1-2 x/week or a total of 10 visits over a 90 day period.  Treatment will include: Manual Therapy; Therapeutic Exercises; Neuromuscular Re-education; Therapeutic Activity;  Patient education: Home exercise program instruction; TNE Education; Modalities as needed.    Education or treatment limitation: None  Rehab Potential good      Certification From: 12/3/2024  To:3/3/2025

## 2025-01-15 ENCOUNTER — LAB ENCOUNTER (OUTPATIENT)
Dept: LAB | Age: 40
End: 2025-01-15
Attending: Nurse Practitioner
Payer: COMMERCIAL

## 2025-01-15 ENCOUNTER — OFFICE VISIT (OUTPATIENT)
Dept: INTERNAL MEDICINE CLINIC | Facility: CLINIC | Age: 40
End: 2025-01-15

## 2025-01-15 VITALS
WEIGHT: 130.81 LBS | SYSTOLIC BLOOD PRESSURE: 108 MMHG | HEIGHT: 65 IN | HEART RATE: 68 BPM | DIASTOLIC BLOOD PRESSURE: 73 MMHG | BODY MASS INDEX: 21.79 KG/M2

## 2025-01-15 DIAGNOSIS — R92.333 HETEROGENEOUSLY DENSE TISSUE OF BOTH BREASTS ON MAMMOGRAPHY: ICD-10-CM

## 2025-01-15 DIAGNOSIS — Z00.00 WELLNESS EXAMINATION: ICD-10-CM

## 2025-01-15 DIAGNOSIS — Z13.29 THYROID DISORDER SCREEN: ICD-10-CM

## 2025-01-15 DIAGNOSIS — Z86.39 H/O VITAMIN D DEFICIENCY: ICD-10-CM

## 2025-01-15 DIAGNOSIS — Z13.0 SCREENING FOR DEFICIENCY ANEMIA: ICD-10-CM

## 2025-01-15 DIAGNOSIS — Z02.89 ENCOUNTER FOR COMPLETION OF FORM WITH PATIENT: ICD-10-CM

## 2025-01-15 DIAGNOSIS — Z23 NEED FOR TDAP VACCINATION: ICD-10-CM

## 2025-01-15 DIAGNOSIS — Z00.00 WELLNESS EXAMINATION: Primary | ICD-10-CM

## 2025-01-15 DIAGNOSIS — Z13.220 LIPID SCREENING: ICD-10-CM

## 2025-01-15 LAB
ALBUMIN SERPL-MCNC: 4.6 G/DL (ref 3.2–4.8)
ALBUMIN/GLOB SERPL: 1.9 {RATIO} (ref 1–2)
ALP LIVER SERPL-CCNC: 55 U/L
ALT SERPL-CCNC: 13 U/L
ANION GAP SERPL CALC-SCNC: 8 MMOL/L (ref 0–18)
AST SERPL-CCNC: 17 U/L (ref ?–34)
BASOPHILS # BLD AUTO: 0.05 X10(3) UL (ref 0–0.2)
BASOPHILS NFR BLD AUTO: 1.1 %
BILIRUB SERPL-MCNC: 0.6 MG/DL (ref 0.3–1.2)
BUN BLD-MCNC: 9 MG/DL (ref 9–23)
BUN/CREAT SERPL: 11.8 (ref 10–20)
CALCIUM BLD-MCNC: 9.7 MG/DL (ref 8.7–10.4)
CHLORIDE SERPL-SCNC: 104 MMOL/L (ref 98–112)
CHOLEST SERPL-MCNC: 174 MG/DL (ref ?–200)
CO2 SERPL-SCNC: 29 MMOL/L (ref 21–32)
CREAT BLD-MCNC: 0.76 MG/DL
CRP SERPL HS-MCNC: 0.2 MG/L (ref ?–3)
DEPRECATED RDW RBC AUTO: 41.5 FL (ref 35.1–46.3)
EGFRCR SERPLBLD CKD-EPI 2021: 102 ML/MIN/1.73M2 (ref 60–?)
EOSINOPHIL # BLD AUTO: 0.12 X10(3) UL (ref 0–0.7)
EOSINOPHIL NFR BLD AUTO: 2.7 %
ERYTHROCYTE [DISTWIDTH] IN BLOOD BY AUTOMATED COUNT: 11.6 % (ref 11–15)
FASTING PATIENT LIPID ANSWER: YES
FASTING STATUS PATIENT QL REPORTED: YES
GLOBULIN PLAS-MCNC: 2.4 G/DL (ref 2–3.5)
GLUCOSE BLD-MCNC: 91 MG/DL (ref 70–99)
HCT VFR BLD AUTO: 40.9 %
HCYS SERPL-SCNC: 7.5 UMOL/L (ref 3.7–13.9)
HDLC SERPL-MCNC: 59 MG/DL (ref 40–59)
HGB BLD-MCNC: 13.8 G/DL
IMM GRANULOCYTES # BLD AUTO: 0.01 X10(3) UL (ref 0–1)
IMM GRANULOCYTES NFR BLD: 0.2 %
LDLC SERPL CALC-MCNC: 104 MG/DL (ref ?–100)
LYMPHOCYTES # BLD AUTO: 1.85 X10(3) UL (ref 1–4)
LYMPHOCYTES NFR BLD AUTO: 40.9 %
MCH RBC QN AUTO: 32.8 PG (ref 26–34)
MCHC RBC AUTO-ENTMCNC: 33.7 G/DL (ref 31–37)
MCV RBC AUTO: 97.1 FL
MONOCYTES # BLD AUTO: 0.42 X10(3) UL (ref 0.1–1)
MONOCYTES NFR BLD AUTO: 9.3 %
NEUTROPHILS # BLD AUTO: 2.07 X10 (3) UL (ref 1.5–7.7)
NEUTROPHILS # BLD AUTO: 2.07 X10(3) UL (ref 1.5–7.7)
NEUTROPHILS NFR BLD AUTO: 45.8 %
NONHDLC SERPL-MCNC: 115 MG/DL (ref ?–130)
OSMOLALITY SERPL CALC.SUM OF ELEC: 290 MOSM/KG (ref 275–295)
PLATELET # BLD AUTO: 184 10(3)UL (ref 150–450)
POTASSIUM SERPL-SCNC: 4.2 MMOL/L (ref 3.5–5.1)
PROT SERPL-MCNC: 7 G/DL (ref 5.7–8.2)
RBC # BLD AUTO: 4.21 X10(6)UL
SODIUM SERPL-SCNC: 141 MMOL/L (ref 136–145)
TRIGL SERPL-MCNC: 53 MG/DL (ref 30–149)
TSI SER-ACNC: 1.94 UIU/ML (ref 0.55–4.78)
VIT D+METAB SERPL-MCNC: 30.5 NG/ML (ref 30–100)
VLDLC SERPL CALC-MCNC: 9 MG/DL (ref 0–30)
WBC # BLD AUTO: 4.5 X10(3) UL (ref 4–11)

## 2025-01-15 PROCEDURE — 99396 PREV VISIT EST AGE 40-64: CPT | Performed by: NURSE PRACTITIONER

## 2025-01-15 PROCEDURE — 85025 COMPLETE CBC W/AUTO DIFF WBC: CPT

## 2025-01-15 PROCEDURE — 36415 COLL VENOUS BLD VENIPUNCTURE: CPT

## 2025-01-15 PROCEDURE — 84443 ASSAY THYROID STIM HORMONE: CPT

## 2025-01-15 PROCEDURE — 3008F BODY MASS INDEX DOCD: CPT | Performed by: NURSE PRACTITIONER

## 2025-01-15 PROCEDURE — 3074F SYST BP LT 130 MM HG: CPT | Performed by: NURSE PRACTITIONER

## 2025-01-15 PROCEDURE — 83090 ASSAY OF HOMOCYSTEINE: CPT

## 2025-01-15 PROCEDURE — 80053 COMPREHEN METABOLIC PANEL: CPT

## 2025-01-15 PROCEDURE — 3078F DIAST BP <80 MM HG: CPT | Performed by: NURSE PRACTITIONER

## 2025-01-15 PROCEDURE — 86141 C-REACTIVE PROTEIN HS: CPT

## 2025-01-15 PROCEDURE — 82306 VITAMIN D 25 HYDROXY: CPT

## 2025-01-15 PROCEDURE — 80061 LIPID PANEL: CPT

## 2025-01-15 PROCEDURE — 90715 TDAP VACCINE 7 YRS/> IM: CPT | Performed by: NURSE PRACTITIONER

## 2025-01-15 PROCEDURE — 90471 IMMUNIZATION ADMIN: CPT | Performed by: NURSE PRACTITIONER

## 2025-01-15 NOTE — PATIENT INSTRUCTIONS
- Annual screening labs  - Aim for 30 minutes of moderate to vigorous activity 4-5 times per week. Healthy diet - plenty of leafy green vegetables, lean protein, fruits, whole grains.  - Biannual dental exams and annual vision exam.  - Return in approximately 1 year for annual physical.

## 2025-01-15 NOTE — PROGRESS NOTES
Subjective:   Lorri Gunderson is a 39 year old female who presents for Physical     40yo F with no significant chronic medical conditions presents for annual wellness exam, and completion of wellness form for insurance.    She lives at home with her  and 3 children, aged 12, 10, 5.  She eats a balanced diet, exercises 3-4 times a week for 60 minutes. she does not use tobacco or drugs, she has maybe 2-3 alcoholic drinks per week.  She is not currently working outside the home.  Wellness for insurance form    She was seen for the first time by an outside OB/GYN in May 2024.  Her last Pap was performed within the past 3 years.  She has a history of abnormal Pap back in 2006, follow-up testing was negative.  She has regular menstrual cycles.  Was previously on oral contraceptive pills but taken off due to hepatic nodule.  Her  had a vasectomy.  Her last mammogram was in July 2024.  Per report, follow-up mammography recommended in 12 months.  However, patient states she received a phone call from the office expressing that she has heterogenously dense breast tissue and they recommended ultrasound.  By review of records does not show that this was ordered by their office.  She is interested in having breast ultrasound performed.    She had a colonoscopy performed in 2023.  Results showed internal hemorrhoids.  She is unsure when her next is due.    I saw her a couple months ago for low back pain exacerbation and hemorrhoids.  She reports that hemorrhoids have since resolved, and that she has been seeing physical therapy continuously for her low back pain.  She has experienced tremendous relief with physical therapy.     She is interested in annual lab work, including homocysteine level, high-sensitivity CRP to follow-up on level she had drawn a couple of years ago.  She is interested in these based on recommendations by a wellness physician that she and her  have been following.    That  interested in flu or COVID vaccines this season.  Her last Tdap was documented in 2012.  She will get that done today.      History/Other:    Chief Complaint Reviewed and Verified  No Further Nursing Notes to   Review  Tobacco Reviewed  Allergies Reviewed  Medications Reviewed    Problem List Reviewed  Medical History Reviewed  Surgical History   Reviewed  OB Status Reviewed  Family History Reviewed         Tobacco:  She has never smoked tobacco.    Current Outpatient Medications   Medication Sig Dispense Refill    pramoxine perianal 1 % External Foam Place 1 applicator rectally every 2 (two) hours as needed for Hemorrhoids. (Patient not taking: Reported on 1/15/2025) 15 g 0    hydrocortisone 2.5 % External Cream Apply 1 Application topically 2 (two) times daily. (Patient not taking: Reported on 1/15/2025) 20 g 0    dibucaine 1 % External Ointment Apply 1 Application topically 3 (three) times daily. (Patient not taking: Reported on 1/15/2025) 28 g 0    methylPREDNISolone (MEDROL) 4 MG Oral Tablet Therapy Pack Take as per package insert (Patient not taking: Reported on 1/15/2025) 1 each 0    MELOXICAM 15 MG Oral Tab TAKE 1 TABLET (15 MG TOTAL) BY MOUTH DAILY. (Patient not taking: Reported on 1/15/2025) 30 tablet 0    predniSONE 10 MG (21) Oral Tablet Therapy Pack Take 10 mg by mouth daily. 7 tabs day 1, 6 tabs day 2 , 5 tabs day 3, 4 tabs day 4, 3 tabs day 5, 2 tabs day 6, 1 tab day 7 (Patient not taking: Reported on 1/15/2025) 28 each 0    methylPREDNISolone (MEDROL) 4 MG Oral Tablet Therapy Pack As directed. (Patient not taking: Reported on 1/15/2025) 1 each 0         Review of Systems:  Review of Systems  10 point review of systems otherwise negative with the exception of HPI and assessment and plan.    Objective:   /73 (BP Location: Left arm, Patient Position: Sitting, Cuff Size: adult)   Pulse 68   Ht 5' 5\" (1.651 m)   Wt 130 lb 12.8 oz (59.3 kg)   LMP 01/06/2025 (Approximate)   BMI 21.77  kg/m²  Estimated body mass index is 21.77 kg/m² as calculated from the following:    Height as of this encounter: 5' 5\" (1.651 m).    Weight as of this encounter: 130 lb 12.8 oz (59.3 kg).      Physical Exam  Vitals reviewed.   Constitutional:       Appearance: Normal appearance.   HENT:      Head: Normocephalic.      Right Ear: Tympanic membrane normal.      Left Ear: Tympanic membrane normal.      Nose: Nose normal.      Mouth/Throat:      Mouth: Mucous membranes are moist.      Pharynx: No posterior oropharyngeal erythema.   Eyes:      Conjunctiva/sclera: Conjunctivae normal.      Pupils: Pupils are equal, round, and reactive to light.   Neck:      Thyroid: No thyroid mass, thyromegaly or thyroid tenderness.   Cardiovascular:      Rate and Rhythm: Normal rate and regular rhythm.      Pulses: Normal pulses.      Heart sounds: Normal heart sounds, S1 normal and S2 normal. No murmur heard.  Pulmonary:      Effort: Pulmonary effort is normal.      Breath sounds: Normal breath sounds.   Abdominal:      General: Bowel sounds are normal.      Palpations: Abdomen is soft.      Tenderness: There is no abdominal tenderness.   Musculoskeletal:      Cervical back: Normal range of motion and neck supple.      Right lower leg: No edema.      Left lower leg: No edema.   Lymphadenopathy:      Cervical: No cervical adenopathy.      Upper Body:      Right upper body: No supraclavicular adenopathy.      Left upper body: No supraclavicular adenopathy.   Skin:     General: Skin is warm and dry.      Capillary Refill: Capillary refill takes less than 2 seconds.   Neurological:      General: No focal deficit present.      Mental Status: She is alert and oriented to person, place, and time.   Psychiatric:         Mood and Affect: Mood and affect normal.         Speech: Speech normal.         Assessment & Plan:   1. Wellness examination (Primary)  -     Comp Metabolic Panel (14); Future; Expected date: 01/15/2025  -     [facility] With  Differential With Platelet; Future; Expected date: 01/15/2025  -     Lipid Panel; Future; Expected date: 01/15/2025  -     TSH W Reflex To Free T4; Future; Expected date: 01/15/2025  -     Vitamin D, 25-Hydroxy; Future; Expected date: 01/15/2025  -     C-RP/High Sensitivity; Future; Expected date: 01/15/2025  -     Homocysteine; Future; Expected date: 01/15/2025  - Annual screening labs  - Continue to aim for 30 minutes of moderate to vigorous activity 4-5 times per week. Healthy diet - plenty of leafy green vegetables, lean protein, fruits, whole grains.  - Biannual dental exams and annual vision exam.  - Return in approximately 1 year for annual physical.  - She specifically requested high sensitivity CRP and homocysteine levels checked per wellness physician she and her  follow - we discussed these may not be covered by insurance, are nonspecific measures of inflammation and amino acids in the blood - she wishes to proceed with the testing and understands they may not be covered by insurance.  2. Encounter for completion of form with patient  - wellness form for insurance company completed with patient.  3. Lipid screening  -     Lipid Panel; Future; Expected date: 01/15/2025  - As above.  4. Thyroid disorder screen  -     TSH W Reflex To Free T4; Future; Expected date: 01/15/2025  - As above  5. H/O vitamin D deficiency  -     Vitamin D, 25-Hydroxy; Future; Expected date: 01/15/2025  - H/o low Vit D level - will check today.  6. Screening for deficiency anemia  -     CBC With Differential With Platelet; Future; Expected date: 01/15/2025  - As above.   7. Heterogeneously dense tissue of both breasts on mammography  -     US BREAST BILATERAL COMPLETE (CPT=76641-50); Future; Expected date: 01/15/2025  - Per patient request. Discussed she should contact her insurance company with CPT coding to check coverage prior to ordering/completing.  8. Need for Tdap vaccination  -     TETANUS, DIPHTHERIA TOXOIDS AND  ACELLULAR PERTUSIS VACCINE (TDAP), >7 YEARS, IM USE  - Administered in office today.        Return in about 1 year (around 1/15/2026) for annual exam.    RAVINDRA Lerner, 1/15/2025, 8:34 AM     This note was prepared using Dragon Medical voice recognition dictation software. As a result errors may occur. When identified, these errors have been corrected. While every attempt is made to correct errors during dictation discrepancies may still exist.

## 2025-01-16 ENCOUNTER — OFFICE VISIT (OUTPATIENT)
Dept: PHYSICAL THERAPY | Facility: HOSPITAL | Age: 40
End: 2025-01-16
Attending: PHYSICAL MEDICINE & REHABILITATION
Payer: COMMERCIAL

## 2025-01-16 PROCEDURE — 97112 NEUROMUSCULAR REEDUCATION: CPT | Performed by: PHYSICAL THERAPIST

## 2025-01-16 PROCEDURE — 97530 THERAPEUTIC ACTIVITIES: CPT | Performed by: PHYSICAL THERAPIST

## 2025-01-16 NOTE — PROGRESS NOTES
2025    Dx: L4-5 right mild herniated disc  (primary encounter diagnosis)  L5-S1 left mild-mod/right mild, L4-5 right mild foraminal stenosis  L5-S1 left mod foraminal & mild-mod diffuse, L4-5 right foraminal & right lateral recess mild-mod, L3-4 mild central bulging discs  Adolescent idiopathic scoliosis of thoracolumbar region  Anxiety  Asherman's syndrome  right L4 and L5-S1 radiculopathy                Authorized # of Visits:  10 visits on the POC          Next MD visit: none   Fall Risk: standard         Precautions: scoliosis,   Medication Changes since last visit?: No    Subjective: Reports she is feeling better.  Reports she had two days where her back didn't hurt at all.  States she one day of more intense pain but states she is overall better.  Also going to see a message therapist in Feb.    Pain Ratin-2/10 VAS    Objective:      1/3/2025  Visit #6 2025  Visit #7 2025  Visit #8   Manual Therapy      Therapeutic Exercise      Therapeutic Activity -education on how alignment can influence strain patterns on the disc    - education to do her HEP before and after workouts  - education on positioning of her back while doing [place] workouts   Neuromuscular Education 90/90 reposition with L hip shift    R SL, respiratory adductor pull backs    R SL, L gut med    L SL, resisted R glut max    L SL, knee towards knee  90/90 repositioning with R hip lift    Standing wall supported hip IR with balloon    Standing supported L AF IR with R FA abduction       TNE Education      HEP 90/90 reposition with L hip shift    R SL, respiratory adductor pull backs    R SL, L gut med    L SL, resisted R glut max    L SL, knee towards knee  Standing supported L AF IR with R FA abduction         Assessment: No adverse effects to treatment. Less pain this date.  Was able to find and feel reference center and able to tolerate standing supported activities after being neutral. Note L inlet adductors  fatigue quickly.  Given an updated HEP.    Goals:      The pt was educated on the plan of care, purpose and individual goals for therapy, precautions for therapy.  All questions were answered.     1.  The pt will be independent in their HEP.  2.  Centralization of symptoms to the lumbar spine.  3.  The pt will be independent in a modified workout program.  4.  The pt will be able to return to weight lifting without an increase in pain.  5.  The pt will be able to  an item off the floor without an increase in pain.      Frequency / Duration: Patient will be seen for 1-2 x/week or a total of 10 visits over a 90 day period.  Treatment will include: Manual Therapy; Therapeutic Exercises; Neuromuscular Re-education; Therapeutic Activity;  Patient education: Home exercise program instruction; TNE Education; Modalities as needed.    Education or treatment limitation: None  Rehab Potential good      Certification From: 12/3/2024  To:3/3/2025      Charges: NM3  (38), TA1(8)   Total Timed Treatment: 46 min  Total Treatment Time: 46 min              FOR REFERENCE ONLY   LUMBAR SPINE EVALUATION:   Referring Physician: Dr. Robbins ref. provider found  Diagnosis: L4-5 right mild herniated disc  (primary encounter diagnosis)  L5-S1 left mild-mod/right mild, L4-5 right mild foraminal stenosis  L5-S1 left mod foraminal & mild-mod diffuse, L4-5 right foraminal & right lateral recess mild-mod, L3-4 mild central bulging discs  Adolescent idiopathic scoliosis of thoracolumbar region  Anxiety  Asherman's syndrome  right L4 and L5-S1 radiculopathy     Date of Service: 12/3/2024   Date of Onset: new episode of pain on 11/27/24    PATIENT SUMMARY   Lorri Gunderson is a 39 year old y/o female who presents to therapy today with complaints of LBP.  Reports she has been feeling good but had a recent flare up.  State she got stuck in a lateral shift position.  States she got the point that she was stuck bent over and was stuck in the  position and had to lay on the floor for over an hour before she could move.    Reports the R LE and R hip were weak per Dr. Aguilar    States she was lifting a 55 lbs kettle bells recently and felt really good.  States she was feel strong with her workouts so she was frustrated when her back flared up again.    Reports her pain is less than when this episode started but states she very apprehensive to move.    History of current condition: states she has had multiple flare ups in the past . Reports she never felt like she really recovered from her flare up in the summer.      Pain Rating:  Worst 10/10 VAS during spasm,     Current functional limitations include limited ability to bend/lift/twist, limited ability to lift and carry objects, limited ability to workout.   Lorri describes prior level of function independent in all activities.  Worked our regularly at the gym/lifting weights.       Pt goals include decrease pain, return to her workouts..    Past medical history was reviewed with Lorri. Significant findings include   Past Medical History:    Chronic pain of both knees    Formatting of this note might be different from the original. Last Assessment & Plan: She is training for marathon, notes pain in her knees that is interfering with her progress. No associated swelling or joint instability.  Plan:  Referred for PT for further evaluation and management.    Crushing injury of toe of right foot    Esophageal reflux    Eustachian tube dysfunction, bilateral    Lumbar disc herniation    Lumbar radicular pain    Tension-type headache, not intractable    Formatting of this note might be different from the original. Last Assessment & Plan: Off and on headaches for the last 2-3 weeks, predominantly around her temples, without associated visual change, nausea, vomiting, or light sensitivity.  Minimal caffeine intake.  Some increased stress, lost a friend to cancer, going through process of possibly having third  child through surrogate.  Training for      Past Surgical History:   Procedure Laterality Date          Enlarge breast with implant      Enlarge breast with implant       .     Are you being hurt, frightened, demeaned, or taken advantage of by anyone at your home or in your life?  No     Have you recently had thoughts of hurting yourself?  No    Have you tried to hurt yourself in the past?  No      ASSESSMENT:   Lorri Gunderson presents back to therapy with a increased episode of LBP without radicular symptoms beyond the glutes but reports significant loss of mobility and inability to move.  The pt intensity has decreased with the use of medications but she displays significant apprehension with movement, (+) SANDRA testing, decreased mobility at the L4L5 and L5S1 segments and increased pain that limits her functional mobility.  Recommend skilled PT 1-2 times per week for up to 10 visits and then re-evulate at that time.  The pt is in agreement with the POC.    Precautions:  Csection,       OBJECTIVE:   Observation/Posture: anterior pelvic tilt but kyphotic position of the lumbar spine  Gait: bilateral heel toe gait pattern without gross derivation  ROM:     Trunk         Pain (+/-)   Flexion NT                     Extension Decreased 25%    R Sidebend Decreased 25%    L Sidebend Decreased 25%    R Rotation NT    L Rotation NT    R Sideglide WFL    L Sideglide WFL      Repeated Motion Testing: repeated extension in lying - decreased/no change    SANDRA Testing R L   Adduction Drop Test (+) (+)   Extension Drop Test NT NT   SLR 65 75   Trunk Rotation NT NT   Posterior Mediastinum Expansion Test decreased decreased   Standing Reach Test - -   PART + +   PADT + +   Hrusta Adduction Lift Test 2 2       STRENGTH:   5/5 MMT Scale   Left  Right Comments   Hip Flexion (L2) 5 5    Knee Extension (L3) 5 5    Knee Flexion 5 5    Ankle DF (L4) 5 5    EHL (L5) 5 5    Ankle PF (S1) 5 5    Hip Abduction NT NT    Hip  Extension NT NT      Flexibility:      R L   Hamstrings long long   Piriformis long WFL   Hip Flexor long long       Palpation: TTP over L4L5 and L5Sq  Neuro Screen: (-) heel walking and toe walking  Accessory Motion: decreased mobility at L5S1  Special Tests: (-) SLR, (-) Slum[    Outcome Surverys  Oswestry Disability Index Score  No data recorded      Today’s Treatment and Response:     12/3/2024  Visit #   1   Manual Therapy    Therapeutic Exercise    Therapeutic Activity Educated in centralization of symptoms and precautions for therapy    Education on long term management strategies for pain    Education on our position and influence the stress on her lumbar discs   Neuromuscular Education    TNE Education    HEP              Goals:      The pt was educated on the plan of care, purpose and individual goals for therapy, precautions for therapy.  All questions were answered.     1.  The pt will be independent in their HEP.  2.  Centralization of symptoms to the lumbar spine.  3.  The pt will be independent in a modified workout program.  4.  The pt will be able to return to weight lifting without an increase in pain.  5.  The pt will be able to  an item off the floor without an increase in pain.      Frequency / Duration: Patient will be seen for 1-2 x/week or a total of 10 visits over a 90 day period.  Treatment will include: Manual Therapy; Therapeutic Exercises; Neuromuscular Re-education; Therapeutic Activity;  Patient education: Home exercise program instruction; TNE Education; Modalities as needed.    Education or treatment limitation: None  Rehab Potential good      Certification From: 12/3/2024  To:3/3/2025

## 2025-01-23 ENCOUNTER — OFFICE VISIT (OUTPATIENT)
Dept: PHYSICAL THERAPY | Facility: HOSPITAL | Age: 40
End: 2025-01-23
Attending: PHYSICAL MEDICINE & REHABILITATION
Payer: COMMERCIAL

## 2025-01-23 PROCEDURE — 97530 THERAPEUTIC ACTIVITIES: CPT | Performed by: PHYSICAL THERAPIST

## 2025-01-23 PROCEDURE — 97112 NEUROMUSCULAR REEDUCATION: CPT | Performed by: PHYSICAL THERAPIST

## 2025-01-23 NOTE — PROGRESS NOTES
2025    Dx: L4-5 right mild herniated disc  (primary encounter diagnosis)  L5-S1 left mild-mod/right mild, L4-5 right mild foraminal stenosis  L5-S1 left mod foraminal & mild-mod diffuse, L4-5 right foraminal & right lateral recess mild-mod, L3-4 mild central bulging discs  Adolescent idiopathic scoliosis of thoracolumbar region  Anxiety  Asherman's syndrome  right L4 and L5-S1 radiculopathy                Authorized # of Visits:  10 visits on the POC          Next MD visit: none   Fall Risk: standard         Precautions: scoliosis,   Medication Changes since last visit?: No    Subjective: Reports she saw the chiropractor who did adjust her.  States she always has LBP.  States she has had some \"zinging pain\" in the L LE.  Pain Ratin-2/10 VAS    Objective:      1/3/2025  Visit #6 2025  Visit #7 2025  Visit #8 2025  Visit #9   Manual Therapy       Therapeutic Exercise       Therapeutic Activity -education on how alignment can influence strain patterns on the disc    - education to do her HEP before and after workouts  - education on positioning of her back while doing [place] workouts - education on centralization of symptoms     - education on how postural alignment affects her pain   Neuromuscular Education 90/90 reposition with L hip shift    R SL, respiratory adductor pull backs    R SL, L gut med    L SL, resisted R glut max    L SL, knee towards knee  /90 repositioning with R hip lift    Standing wall supported hip IR with balloon    Standing supported L AF IR with R FA abduction     90/90 repositioning     L SL, R glut max    L SL, knee toward knee    SANDRA Wall Supported Squat with Balloon     L glut push in standing   TNE Education       HEP 90/90 reposition with L hip shift    R SL, respiratory adductor pull backs    R SL, L gut med    L SL, resisted R glut max    L SL, knee towards knee  Standing supported L AF IR with R FA abduction L SL, knee toward knee    SANDRA Wall  Supported Squat with Balloon     L glut push in standing         Assessment: No adverse effects to treatment. The pt continues to have LBP and intermittent L sided radicular symptoms to the thigh.  She is now able to get neutral in the pelvis with SANDRA techniques and the reports less pain but she is not able to stay neutral during higher level activities during her workout classes.      Plan:  Progress to more integration activities in upright to allow for increased carry over between visits.    Goals:      The pt was educated on the plan of care, purpose and individual goals for therapy, precautions for therapy.  All questions were answered.     1.  The pt will be independent in their HEP.  2.  Centralization of symptoms to the lumbar spine.  3.  The pt will be independent in a modified workout program.  4.  The pt will be able to return to weight lifting without an increase in pain.  5.  The pt will be able to  an item off the floor without an increase in pain.      Frequency / Duration: Patient will be seen for 1-2 x/week or a total of 10 visits over a 90 day period.  Treatment will include: Manual Therapy; Therapeutic Exercises; Neuromuscular Re-education; Therapeutic Activity;  Patient education: Home exercise program instruction; TNE Education; Modalities as needed.    Education or treatment limitation: None  Rehab Potential good      Certification From: 12/3/2024  To:3/3/2025      Charges: NM 2 (30), TA1(8)   Total Timed Treatment: 38  Total Treatment Time: 38 min              FOR REFERENCE ONLY   LUMBAR SPINE EVALUATION:   Referring Physician: Dr. Robbins ref. provider found  Diagnosis: L4-5 right mild herniated disc  (primary encounter diagnosis)  L5-S1 left mild-mod/right mild, L4-5 right mild foraminal stenosis  L5-S1 left mod foraminal & mild-mod diffuse, L4-5 right foraminal & right lateral recess mild-mod, L3-4 mild central bulging discs  Adolescent idiopathic scoliosis of thoracolumbar  region  Anxiety  Asherman's syndrome  right L4 and L5-S1 radiculopathy     Date of Service: 12/3/2024   Date of Onset: new episode of pain on 11/27/24    PATIENT SUMMARY   Lorri Gunderson is a 39 year old y/o female who presents to therapy today with complaints of LBP.  Reports she has been feeling good but had a recent flare up.  State she got stuck in a lateral shift position.  States she got the point that she was stuck bent over and was stuck in the position and had to lay on the floor for over an hour before she could move.    Reports the R LE and R hip were weak per Dr. Aguilar    States she was lifting a 55 lbs kettle bells recently and felt really good.  States she was feel strong with her workouts so she was frustrated when her back flared up again.    Reports her pain is less than when this episode started but states she very apprehensive to move.    History of current condition: states she has had multiple flare ups in the past . Reports she never felt like she really recovered from her flare up in the summer.      Pain Rating:  Worst 10/10 VAS during spasm,     Current functional limitations include limited ability to bend/lift/twist, limited ability to lift and carry objects, limited ability to workout.   Lorri describes prior level of function independent in all activities.  Worked our regularly at the gym/lifting weights.       Pt goals include decrease pain, return to her workouts..    Past medical history was reviewed with Lorri. Significant findings include   Past Medical History:    Chronic pain of both knees    Formatting of this note might be different from the original. Last Assessment & Plan: She is training for marathon, notes pain in her knees that is interfering with her progress. No associated swelling or joint instability.  Plan:  Referred for PT for further evaluation and management.    Crushing injury of toe of right foot    Esophageal reflux    Eustachian tube dysfunction,  bilateral    Lumbar disc herniation    Lumbar radicular pain    Tension-type headache, not intractable    Formatting of this note might be different from the original. Last Assessment & Plan: Off and on headaches for the last 2-3 weeks, predominantly around her temples, without associated visual change, nausea, vomiting, or light sensitivity.  Minimal caffeine intake.  Some increased stress, lost a friend to cancer, going through process of possibly having third child through surrogate.  Training for      Past Surgical History:   Procedure Laterality Date          Enlarge breast with implant      Enlarge breast with implant       .     Are you being hurt, frightened, demeaned, or taken advantage of by anyone at your home or in your life?  No     Have you recently had thoughts of hurting yourself?  No    Have you tried to hurt yourself in the past?  No      ASSESSMENT:   Lorri Gunderson presents back to therapy with a increased episode of LBP without radicular symptoms beyond the glutes but reports significant loss of mobility and inability to move.  The pt intensity has decreased with the use of medications but she displays significant apprehension with movement, (+) SANDRA testing, decreased mobility at the L4L5 and L5S1 segments and increased pain that limits her functional mobility.  Recommend skilled PT 1-2 times per week for up to 10 visits and then re-evulate at that time.  The pt is in agreement with the POC.    Precautions:  Csection,       OBJECTIVE:   Observation/Posture: anterior pelvic tilt but kyphotic position of the lumbar spine  Gait: bilateral heel toe gait pattern without gross derivation  ROM:     Trunk         Pain (+/-)   Flexion NT                     Extension Decreased 25%    R Sidebend Decreased 25%    L Sidebend Decreased 25%    R Rotation NT    L Rotation NT    R Sideglide WFL    L Sideglide WFL      Repeated Motion Testing: repeated extension in lying - decreased/no  change    SANDRA Testing R L   Adduction Drop Test (+) (+)   Extension Drop Test NT NT   SLR 65 75   Trunk Rotation NT NT   Posterior Mediastinum Expansion Test decreased decreased   Standing Reach Test - -   PART + +   PADT + +   H[place]a Adduction Lift Test 2 2       STRENGTH:   5/5 MMT Scale   Left  Right Comments   Hip Flexion (L2) 5 5    Knee Extension (L3) 5 5    Knee Flexion 5 5    Ankle DF (L4) 5 5    EHL (L5) 5 5    Ankle PF (S1) 5 5    Hip Abduction NT NT    Hip Extension NT NT      Flexibility:      R L   Hamstrings long long   Piriformis long WFL   Hip Flexor long long       Palpation: TTP over L4L5 and L5Sq  Neuro Screen: (-) heel walking and toe walking  Accessory Motion: decreased mobility at L5S1  Special Tests: (-) SLR, (-) Slum[    Outcome Surverys  Oswestry Disability Index Score  No data recorded      Today’s Treatment and Response:     12/3/2024  Visit #   1   Manual Therapy    Therapeutic Exercise    Therapeutic Activity Educated in centralization of symptoms and precautions for therapy    Education on long term management strategies for pain    Education on our position and influence the stress on her lumbar discs   Neuromuscular Education    TNE Education    HEP              Goals:      The pt was educated on the plan of care, purpose and individual goals for therapy, precautions for therapy.  All questions were answered.     1.  The pt will be independent in their HEP.  2.  Centralization of symptoms to the lumbar spine.  3.  The pt will be independent in a modified workout program.  4.  The pt will be able to return to weight lifting without an increase in pain.  5.  The pt will be able to  an item off the floor without an increase in pain.      Frequency / Duration: Patient will be seen for 1-2 x/week or a total of 10 visits over a 90 day period.  Treatment will include: Manual Therapy; Therapeutic Exercises; Neuromuscular Re-education; Therapeutic Activity;  Patient education: Home  exercise program instruction; TNE Education; Modalities as needed.    Education or treatment limitation: None  Rehab Potential good      Certification From: 12/3/2024  To:3/3/2025

## 2025-01-28 ENCOUNTER — OFFICE VISIT (OUTPATIENT)
Dept: PHYSICAL THERAPY | Facility: HOSPITAL | Age: 40
End: 2025-01-28
Attending: PHYSICAL MEDICINE & REHABILITATION
Payer: COMMERCIAL

## 2025-01-28 PROCEDURE — 97112 NEUROMUSCULAR REEDUCATION: CPT | Performed by: PHYSICAL THERAPIST

## 2025-01-28 NOTE — PROGRESS NOTES
2025  Patient Name: Lorri Gunderson  YOB: 1985          MRN number:  [id number]  Referring Physician:  Dr. Aguilar    Pt has attended 10, cancelled 0, and no shown 0 visits in Physical Therapy from the Evaluation on 12/3/24 to treatment date of 2025    Dx: L4-5 right mild herniated disc  (primary encounter diagnosis)  L5-S1 left mild-mod/right mild, L4-5 right mild foraminal stenosis  L5-S1 left mod foraminal & mild-mod diffuse, L4-5 right foraminal & right lateral recess mild-mod, L3-4 mild central bulging discs  Adolescent idiopathic scoliosis of thoracolumbar region  Anxiety  Asherman's syndrome  right L4 and L5-S1 radiculopathy                Authorized # of Visits:  10 visits on the POC          Next MD visit: none   Fall Risk: standard         Precautions: scoliosis,   Medication Changes since last visit?: No    Subjective: Reports she woke up this morning with some soreness in the L LB.  Reports she also has some neck pain that she woke up with.  Reports she work up with neck pain.  States she sleeps with her arms over her head and her back arched.  States she feels that contributes but she is not sure how to stop that.  Has been working out regularly.    Pain Rating: 3/10 VAS    Objective:     ROM:     Trunk         Pain (+/-)   Flexion NT                     Extension Decreased 25%    R Sidebend Decreased 25%    L Sidebend Decreased 25%    R Rotation NT    L Rotation NT    R Sideglide WFL    L Sideglide WFL      Repeated Motion Testing: repeated extension in lying - decreased/no change    SANDRA Testing R L   Adduction Drop Test (+) (+)   Extension Drop Test NT NT   SLR 65 75   Trunk Rotation NT NT   Posterior Mediastinum Expansion Test decreased decreased   Standing Reach Test - -   PART + +   PADT + +   Hrusta Adduction Lift Test 2 2       STRENGTH:   5/5 MMT Scale   Left  Right Comments   Hip Flexion (L2) 5 5    Knee Extension (L3) 5 5    Knee Flexion 5 5    Ankle DF  (L4) 5 5    EHL (L5) 5 5    Ankle PF (S1) 5 5    Hip Abduction NT NT    Hip Extension NT NT      Flexibility:      R L   Hamstrings long long   Piriformis long WFL   Hip Flexor long long       Palpation: TTP over L4L5 and L5Sq  Neuro Screen: (-) heel walking and toe walking  Accessory Motion: decreased mobility at L5S1  Special Tests: (-) SLR, (-) Slum[       1/3/2025  Visit #6 1/16/2025  Visit #7 1/16/2025  Visit #8 1/23/2025  Visit #9 1/28/2025  Visit #10   Manual Therapy        Therapeutic Exercise        Therapeutic Activity -education on how alignment can influence strain patterns on the disc    - education to do her HEP before and after workouts  - education on positioning of her back while doing [place] workouts - education on centralization of symptoms     - education on how postural alignment affects her pain    Neuromuscular Education 90/90 reposition with L hip shift    R SL, respiratory adductor pull backs    R SL, L gut med    L SL, resisted R glut max    L SL, knee towards knee  90/90 repositioning with R hip lift    Standing wall supported hip IR with balloon    Standing supported L AF IR with R FA abduction     90/90 repositioning     L SL, R glut max    L SL, knee toward knee    SANDRA Wall Supported Squat with Balloon     L glut push in standing Supine scapular protraction with 3#    Alternating triceps in standing with red Tband    Bilateral triceps in standing with red Tband    Standing pull downs with red Tband   TNE Education        HEP 90/90 reposition with L hip shift    R SL, respiratory adductor pull backs    R SL, L gut med    L SL, resisted R glut max    L SL, knee towards knee  Standing supported L AF IR with R FA abduction L SL, knee toward knee    SANDRA Wall Supported Squat with Balloon     L glut push in standing Supine scapular protraction with 3#    Alternating triceps in standing with red Tband    Bilateral triceps in standing with red Tband    Standing pull downs with red Tband          Assessment: Lorri Gunderson has completed 10 visits of PT and has progressed well. She no longer has acute LBP and has returned to work outs but continues to report pain and soreness in the LB that is worse in the morning and after workouts.  She now displays a neutral pelvic in supine/side lying but continued limitation in the chest wall with decreased ability to keep a neutral pelvic when in standing positions. She will benefit from continued skilled PT to improve her ability to integrate and alterate in standing positions.  Recommend skilled PT 1 time per week for up to 10 visits.  The pt is in agreement with the POC,      Plan:  Progress to more integration activities in upright to allow for increased carry over between visits.    Goals:      The pt was educated on the plan of care, purpose and individual goals for therapy, precautions for therapy.  All questions were answered.     1.  The pt will be independent in their HEP.  2.  Centralization of symptoms to the lumbar spine.  3.  The pt will be independent in a modified workout program.  4.  The pt will be able to return to weight lifting without an increase in pain.  5.  The pt will be able to  an item off the floor without an increase in pain.      Frequency / Duration: Patient will be seen for 1-2 x/week or a total of 10 visits over a 90 day period.  Treatment will include: Manual Therapy; Therapeutic Exercises; Neuromuscular Re-education; Therapeutic Activity;  Patient education: Home exercise program instruction; TNE Education; Modalities as needed.    Education or treatment limitation: None  Rehab Potential good    Charges: NM 4 (53) Total Timed Treatment: 53  Total Treatment Time: 53 min    Patient was advised of these findings, precautions, and treatment options and has agreed to actively participate in planning and for this course of care.    Thank you for your referral. Please co-sign or sign and return this letter via fax as soon  as possible to [phone]. If you have any questions, please contact me at Dept: [phone].    Sincerely,  MAREK JACINTO PT    Electronically signed by therapist: MAREK JACINTO PT    Physician's certification required: Yes  I certify the need for these services furnished under this plan of treatment and while under my care.    X___________________________________________________ Date____________________    Certification From: 1/28/2025  To:4/28/2025

## 2025-01-31 ENCOUNTER — APPOINTMENT (OUTPATIENT)
Dept: PHYSICAL THERAPY | Facility: HOSPITAL | Age: 40
End: 2025-01-31
Attending: PHYSICAL MEDICINE & REHABILITATION
Payer: COMMERCIAL

## 2025-02-07 ENCOUNTER — OFFICE VISIT (OUTPATIENT)
Dept: PHYSICAL THERAPY | Facility: HOSPITAL | Age: 40
End: 2025-02-07
Attending: PHYSICAL MEDICINE & REHABILITATION
Payer: COMMERCIAL

## 2025-02-07 PROCEDURE — 97112 NEUROMUSCULAR REEDUCATION: CPT | Performed by: PHYSICAL THERAPIST

## 2025-02-07 NOTE — PROGRESS NOTES
2025    Patient Name: Lorri Gunderson  YOB: 1985          MRN number:  [id number]  Referring Physician:  Dr. Aguilar    Pt has attended 10, cancelled 0, and no shown 0 visits in Physical Therapy from the Evaluation on 12/3/24 to treatment date of 2025    Dx: L4-5 right mild herniated disc  (primary encounter diagnosis)  L5-S1 left mild-mod/right mild, L4-5 right mild foraminal stenosis  L5-S1 left mod foraminal & mild-mod diffuse, L4-5 right foraminal & right lateral recess mild-mod, L3-4 mild central bulging discs  Adolescent idiopathic scoliosis of thoracolumbar region  Anxiety  Asherman's syndrome  right L4 and L5-S1 radiculopathy                Authorized # of Visits:  10 visits on the POC          Next MD visit: none   Fall Risk: standard         Precautions: scoliosis,   Medication Changes since last visit?: No    Subjective: Reports she is doing better overall.  States she did a really hard workout.  Saw the massage therapist on Tuesday and states that was helpful.    Pain Rating: 3/10 VAS    Objective:     ROM:     Trunk         Pain (+/-)   Flexion NT                     Extension Decreased 25%    R Sidebend Decreased 25%    L Sidebend Decreased 25%    R Rotation NT    L Rotation NT    R Sideglide WFL    L Sideglide WFL      Repeated Motion Testing: repeated extension in lying - decreased/no change    SANDRA Testing R L   Adduction Drop Test (+) (+)   Extension Drop Test NT NT   SLR 65 75   Trunk Rotation NT NT   Posterior Mediastinum Expansion Test decreased decreased   Standing Reach Test - -   PART + +   PADT + +   Hrusta Adduction Lift Test 2 2       STRENGTH:   5/5 MMT Scale   Left  Right Comments   Hip Flexion (L2) 5 5    Knee Extension (L3) 5 5    Knee Flexion 5 5    Ankle DF (L4) 5 5    EHL (L5) 5 5    Ankle PF (S1) 5 5    Hip Abduction NT NT    Hip Extension NT NT      Flexibility:      R L   Hamstrings long long   Piriformis long WFL   Hip Flexor long long        Palpation: TTP over L4L5 and L5Sq  Neuro Screen: (-) heel walking and toe walking  Accessory Motion: decreased mobility at L5S1  Special Tests: (-) SLR, (-) Slum[       1/23/2025  Visit #9 1/28/2025  Visit #10 2/7/2025  Visit #11   Manual Therapy      Therapeutic Exercise      Therapeutic Activity - education on centralization of symptoms     - education on how postural alignment affects her pain     Neuromuscular Education 90/90 repositioning     L SL, R glut max    L SL, knee toward knee    SANDRA Wall Supported Squat with Balloon     L glut push in standing Supine scapular protraction with 3#    Alternating triceps in standing with red Tband    Bilateral triceps in standing with red Tband    Standing pull downs with red Tband 90/90 repositioning with L hip shift and R leg lift    R SL, respiratory adductor pull back    R SL, L glut med    L SL, R glut max    L SL, knee towards knee   TNE Education      HEP L SL, knee toward knee    SANDRA Wall Supported Squat with Balloon     L glut push in standing Supine scapular protraction with 3#    Alternating triceps in standing with red Tband    Bilateral triceps in standing with red Tband    Standing pull downs with red Tband 0/90 repositioning with L hip shift and R leg lift    R SL, respiratory adductor pull back    R SL, L glut med    L SL, R glut max    L SL, knee towards knee         Assessment: No adverse effects to treatment.  The pt displays a non-neutral exam this date after a more intense workout.  She was able to achieve neurtral testing with the above stated activities. Advised to perform these activities prior to and after exercises classes.  The pt will see [facility] for a trial of dry needling to see if that further reduces her symptoms.      Plan:  Progress to more integration activities in upright to allow for increased carry over between visits.    Goals:      The pt was educated on the plan of care, purpose and individual goals for therapy, precautions  for therapy.  All questions were answered.     1.  The pt will be independent in their HEP.  2.  Centralization of symptoms to the lumbar spine.  3.  The pt will be independent in a modified workout program.  4.  The pt will be able to return to weight lifting without an increase in pain.  5.  The pt will be able to  an item off the floor without an increase in pain.      Frequency / Duration: Patient will be seen for 1-2 x/week or a total of 10 visits over a 90 day period.  Treatment will include: Manual Therapy; Therapeutic Exercises; Neuromuscular Re-education; Therapeutic Activity;  Patient education: Home exercise program instruction; TNE Education; Modalities as needed.    Education or treatment limitation: None  Rehab Potential good    Charges: NM 4 (53) Total Timed Treatment: 53  Total Treatment Time: 53 min    Patient was advised of these findings, precautions, and treatment options and has agreed to actively participate in planning and for this course of care.    Thank you for your referral. Please co-sign or sign and return this letter via fax as soon as possible to [phone]. If you have any questions, please contact me at Dept: [phone].    Sincerely,  MAREK JACINTO PT    Electronically signed by therapist: MAREK JACINTO PT    Physician's certification required: Yes  I certify the need for these services furnished under this plan of treatment and while under my care.    X___________________________________________________ Date____________________    Certification From: 1/28/2025  To:4/28/2025

## 2025-02-14 ENCOUNTER — OFFICE VISIT (OUTPATIENT)
Dept: PHYSICAL THERAPY | Facility: HOSPITAL | Age: 40
End: 2025-02-14
Attending: PHYSICAL MEDICINE & REHABILITATION
Payer: COMMERCIAL

## 2025-02-14 PROCEDURE — 97140 MANUAL THERAPY 1/> REGIONS: CPT

## 2025-02-14 PROCEDURE — 97530 THERAPEUTIC ACTIVITIES: CPT

## 2025-02-18 NOTE — PROGRESS NOTES
2025    Patient Name: Lorri Gunderson  YOB: 1985          MRN number:  [id number]  Referring Physician:  Dr. Aguilar    Pt has attended 10, cancelled 0, and no shown 0 visits in Physical Therapy from the Evaluation on 12/3/24 to treatment date of 2025    Dx: L4-5 right mild herniated disc  (primary encounter diagnosis)  L5-S1 left mild-mod/right mild, L4-5 right mild foraminal stenosis  L5-S1 left mod foraminal & mild-mod diffuse, L4-5 right foraminal & right lateral recess mild-mod, L3-4 mild central bulging discs  Adolescent idiopathic scoliosis of thoracolumbar region  Anxiety  Asherman's syndrome  right L4 and L5-S1 radiculopathy                Authorized # of Visits:  10 visits on the POC          Next MD visit: none   Fall Risk: standard         Precautions: scoliosis,   Medication Changes since last visit?: No    Subjective: Reports she is doing better overall.  States she did a really hard workout.  Saw the massage therapist on Tuesday and states that was helpful.    Pain Rating: 3/10 VAS    Objective:     ROM:     Trunk         Pain (+/-)   Flexion NT                     Extension Decreased 25%    R Sidebend Decreased 25%    L Sidebend Decreased 25%    R Rotation NT    L Rotation NT    R Sideglide WFL    L Sideglide WFL      Repeated Motion Testing: repeated extension in lying - decreased/no change    SANDRA Testing R L   Adduction Drop Test (+) (+)   Extension Drop Test NT NT   SLR 65 75   Trunk Rotation NT NT   Posterior Mediastinum Expansion Test decreased decreased   Standing Reach Test - -   PART + +   PADT + +   Hrusta Adduction Lift Test 2 2       STRENGTH:   5/5 MMT Scale   Left  Right Comments   Hip Flexion (L2) 5 5    Knee Extension (L3) 5 5    Knee Flexion 5 5    Ankle DF (L4) 5 5    EHL (L5) 5 5    Ankle PF (S1) 5 5    Hip Abduction NT NT    Hip Extension NT NT      Flexibility:      R L   Hamstrings long long   Piriformis long WFL   Hip Flexor long long        Palpation: TTP over L4L5 and L5Sq  Neuro Screen: (-) heel walking and toe walking  Accessory Motion: decreased mobility at L5S1  Special Tests: (-) SLR, (-) Slum[       1/23/2025  Visit #9 1/28/2025  Visit #10 2/7/2025  Visit #11 2/14/25   Manual Therapy    DN to L UT, LS, R thoracic PVM at levels T5-10    DN to R QL to correct for pelvic elevation   Therapeutic Exercise       Therapeutic Activity - education on centralization of symptoms     - education on how postural alignment affects her pain   Education on effects of dry needling, purpose, and functional anatomy   Neuromuscular Education 90/90 repositioning     L SL, R glut max    L SL, knee toward knee    SANDRA Wall Supported Squat with Balloon     L glut push in standing Supine scapular protraction with 3#    Alternating triceps in standing with red Tband    Bilateral triceps in standing with red Tband    Standing pull downs with red Tband 90/90 repositioning with L hip shift and R leg lift    R SL, respiratory adductor pull back    R SL, L glut med    L SL, R glut max    L SL, knee towards knee    TNE Education       HEP L SL, knee toward knee    SANDRA Wall Supported Squat with Balloon     L glut push in standing Supine scapular protraction with 3#    Alternating triceps in standing with red Tband    Bilateral triceps in standing with red Tband    Standing pull downs with red Tband 0/90 repositioning with L hip shift and R leg lift    R SL, respiratory adductor pull back    R SL, L glut med    L SL, R glut max    L SL, knee towards knee          Assessment: Patient has significant soft tissue restriction in the R thoracic PVM, in addition to having significantly elevated L scapula due scoliosis and shortening of the UT and LS.  Tolerated all treatment well.      Plan:  Trial DN at next follow-up as well.  Consider for long-term pain management.    Goals:      The pt was educated on the plan of care, purpose and individual goals for therapy, precautions for  therapy.  All questions were answered.     1.  The pt will be independent in their HEP.  2.  Centralization of symptoms to the lumbar spine.  3.  The pt will be independent in a modified workout program.  4.  The pt will be able to return to weight lifting without an increase in pain.  5.  The pt will be able to  an item off the floor without an increase in pain.      Frequency / Duration: Patient will be seen for 1-2 x/week or a total of 10 visits over a 90 day period.  Treatment will include: Manual Therapy; Therapeutic Exercises; Neuromuscular Re-education; Therapeutic Activity;  Patient education: Home exercise program instruction; TNE Education; Modalities as needed.    Education or treatment limitation: None  Rehab Potential good    Charges: 2 MT, 1 TA Total Timed Treatment: 45  Total Treatment Time: 45 min

## 2025-02-28 ENCOUNTER — APPOINTMENT (OUTPATIENT)
Dept: PHYSICAL THERAPY | Facility: HOSPITAL | Age: 40
End: 2025-02-28
Attending: PHYSICAL MEDICINE & REHABILITATION
Payer: COMMERCIAL

## 2025-04-04 ENCOUNTER — APPOINTMENT (OUTPATIENT)
Dept: PHYSICAL THERAPY | Facility: HOSPITAL | Age: 40
End: 2025-04-04
Attending: PHYSICAL MEDICINE & REHABILITATION
Payer: COMMERCIAL

## 2025-04-08 ENCOUNTER — TELEPHONE (OUTPATIENT)
Dept: PHYSICAL MEDICINE AND REHAB | Facility: CLINIC | Age: 40
End: 2025-04-08

## 2025-04-08 RX ORDER — CYCLOBENZAPRINE HCL 10 MG
10 TABLET ORAL NIGHTLY
Qty: 30 TABLET | Refills: 0 | Status: SHIPPED | OUTPATIENT
Start: 2025-04-08 | End: 2025-05-08

## 2025-04-08 RX ORDER — METHYLPREDNISOLONE 4 MG/1
TABLET ORAL
Qty: 1 EACH | Refills: 0 | Status: SHIPPED | OUTPATIENT
Start: 2025-04-08

## 2025-04-08 NOTE — TELEPHONE ENCOUNTER
The patient called after hours with complaints of an exacerbation of low back pain today with radiation to the right buttock, slightly below.  She has episodes like this periodically.  These respond to a Medrol Dosepak and muscle relaxers.  She has been trying to do intense workouts and has been noticing more flareups of neck and back pain.  Today I recommended cutting back on intense workouts for 3 days, taking the oral medications and calling the office if no better on Thursday or Friday.  The patient understood the directions and was appreciative.

## 2025-04-10 ENCOUNTER — OFFICE VISIT (OUTPATIENT)
Dept: PHYSICAL THERAPY | Facility: HOSPITAL | Age: 40
End: 2025-04-10
Attending: PHYSICAL MEDICINE & REHABILITATION
Payer: COMMERCIAL

## 2025-04-10 PROCEDURE — 97110 THERAPEUTIC EXERCISES: CPT | Performed by: PHYSICAL THERAPIST

## 2025-04-10 PROCEDURE — 97530 THERAPEUTIC ACTIVITIES: CPT | Performed by: PHYSICAL THERAPIST

## 2025-04-10 PROCEDURE — 97140 MANUAL THERAPY 1/> REGIONS: CPT | Performed by: PHYSICAL THERAPIST

## 2025-04-11 ENCOUNTER — APPOINTMENT (OUTPATIENT)
Dept: PHYSICAL THERAPY | Facility: HOSPITAL | Age: 40
End: 2025-04-11
Attending: PHYSICAL MEDICINE & REHABILITATION
Payer: COMMERCIAL

## 2025-04-11 NOTE — TELEPHONE ENCOUNTER
Patient calling to provide update on back pain flare up that began 4/8/25. Dr. Martinez prescribed Medrol dosepak and flexeril that day.     Endorses right side lower back pain that sometimes radiates to the right buttock and slightly below right hip. Denies n/t. Patient is currently taking medrol dosepak (will be complete on 4/13/25) with relief, however believes it may be time to consider an injection. Her last injection was around 10 years ago. States that she has never had flares this frequently - has had 3-4 since worst exacerbation in November that was \"immobilizing\". Had a neck flare up 3 weeks ago. Patient has noticed that flare ups usually occur the day after lifting heavy weights and last for about a week. Physical therapy is ongoing.     Pain prior to medrol: 9/10  CPL: 4/10    Plan per Dr. Aguilar's LOV note on 12/2/24:    She will get back into the PT with [facility]. ongoing  She will get the scoliosis x-rays. completed  If the PT is not helping after she has done 4 sessions, then she will need to have a new MRI of the lumbar spine. Completed 12/11/24 (uploaded to media)  She was prescribed a Medrol Dosepak incase she has another flare up of the pain.  She will follow up in 3-4 months or sooner if needed.   If the PT is not helping, she might need an MELISSA based on the MRI scan.    Patient states that she saw multiple spine surgeons around December who determined she was not a surgical candidate. CT completed at that time.     Requesting appointment for evaluation and consideration of MELISSA.     RN scheduled patient for appointment with Dr. Aguilar on 4/15/25 - pt verbalized availability and confirmation.     Routing to Dr. Aguilar for review and recommendations.

## 2025-04-11 NOTE — PROGRESS NOTES
Patient: Lorri Gunderson (39 year old, female) Referring Provider:  Insurance:   Diagnosis:   No ref. provider found  [employer] OUT OF STATE   Date of Surgery: No data recorded Next MD visit:  N/A   Precautions:    No data recorded Referral Information:    Date of Evaluation: Req: 0, Auth: 0, Exp:     No data recorded POC Auth Visits:  10       Today's Date   4/10/2025    Subjective  States she did ball slams and then had neck and back pain.  States she had an intense lateral shift and is on a medrol dose pack which is somewhat helpful.       Pain: 8/10     Objective  ispilateral trunk shift R            Assessment  No adverse effects to treatmernt.  The pt displays an ispilateral shift R with R sided pain. Able to correct with self techniques but did not stay neutral after sitting or lying prone.  Advised to continue side glides at home.    Goals (to be met in 10 visits)      1.  The pt will be independent in their HEP.  2.  Centralization of symptoms to the lumbar spine.  3.  The pt will be independent in a modified workout program.  4.  The pt will be able to return to weight lifting without an increase in pain.  5.  The pt will be able to  an item off the floor without an increase in pain.        Plan  Continue PT - progress back to full extension as tolerated    Treatment Last 4 Visits  Treatment Day: 2       4/10/2025   Spine Treatment   Therapeutic Exercise - R side glides  - R side glides with lumbar extension   Manual Therapy - central PA's lower lumbar spine  - unilateral PA's lower lumbar spine  - STM B lumbar parapsinals   Therapeutic Activity - education on centralization of symptoms  - education on need to perform side glides several times per day  - education on positioning   Therapeutic Exercise Minutes 15   Manual Therapy Minutes 23   Therapeutic Activity Minutes 8   Total Time Of Timed Procedures 46   Total Time Of Service-Based Procedures 0   Total Treatment Time 46   HEP - R side  glides  - R side glides with lumbar extension        HEP  - R side glides  - R side glides with lumbar extension    Charges  TE1, Man2, TA1

## 2025-04-15 ENCOUNTER — OFFICE VISIT (OUTPATIENT)
Dept: PHYSICAL MEDICINE AND REHAB | Facility: CLINIC | Age: 40
End: 2025-04-15
Payer: COMMERCIAL

## 2025-04-15 VITALS — WEIGHT: 127 LBS | BODY MASS INDEX: 21.16 KG/M2 | HEIGHT: 65 IN

## 2025-04-15 DIAGNOSIS — M51.9 LUMBAR DISC DISEASE: ICD-10-CM

## 2025-04-15 DIAGNOSIS — M54.16 LUMBAR RADICULOPATHY: Primary | ICD-10-CM

## 2025-04-15 DIAGNOSIS — M51.26 LUMBAR HERNIATED DISC: ICD-10-CM

## 2025-04-15 DIAGNOSIS — F90.0 ATTENTION DEFICIT HYPERACTIVITY DISORDER (ADHD), PREDOMINANTLY INATTENTIVE TYPE: ICD-10-CM

## 2025-04-15 DIAGNOSIS — M48.061 LUMBAR FORAMINAL STENOSIS: ICD-10-CM

## 2025-04-15 DIAGNOSIS — N85.6 ASHERMAN'S SYNDROME: ICD-10-CM

## 2025-04-15 DIAGNOSIS — F41.9 ANXIETY: ICD-10-CM

## 2025-04-15 PROCEDURE — 99214 OFFICE O/P EST MOD 30 MIN: CPT | Performed by: PHYSICAL MEDICINE & REHABILITATION

## 2025-04-15 PROCEDURE — 3008F BODY MASS INDEX DOCD: CPT | Performed by: PHYSICAL MEDICINE & REHABILITATION

## 2025-04-15 NOTE — PATIENT INSTRUCTIONS
Plan  The patient will continue with PT.    The patient will continue with her home exercise program.    The patient does not need any injections at this time.    I reviewed with her the disease process and her work outs.    She will follow up in 6 months or sooner if needed.    Patient should call prior to next visit if new or worsening symptoms.

## 2025-04-15 NOTE — PROGRESS NOTES
Low Back Pain [facility] & P    Chief Complaint:   Chief Complaint   Patient presents with    Follow - Up     Patient here for a follow up. Patient had a flare up on her pain on Tuesday. Denies any accident or injury, but started after weight training. Pain was on the right side of the low back traveling down the right leg. Pain worst with sitting. Pain now 3/10, worst 10/10. Patient completed a medrol dosepack which helped. Patient on PT.      Nursing note reviewed and verified.    Patient was last seen on 12/2/2024.  She has continued with the PT with Nisreen.  She was doing well with the PT and her HEP.  Then 3 weeks ago, she was reaching and developed neck pain.  She saw a chiropractor who was not able to help her.  Nisreen had her do chin tucks and the pain resolved within 24 hours.  She did a more intensity work out the day before she had the neck flare up.   She then did a more intense work out one week ago and she called our office and Dr. Martinez called in a Medrol Dosepak which helped her to control the pain.  She did have a left shift with the right sided pain.  The pain is now 2-3/10, but is was 8-9/10 last week.      Description of the Pain  The pain is located in the right low back.    The pain radiates to the right buttock. This pain is now resolved.   The pain is worse sitting and going from sitting to standing.    The pain is better  with laying supine for the first few days and then doing the Justin techniques .  There is no numbness.  There is no tingling in the legs.  There is not weakness in both legs.     Past Medical History   Past Medical History[1]    Past Surgical History   Past Surgical History[2]    Family History   Family History[3]    Social History   Social History     Socioeconomic History    Marital status:      Spouse name: Not on file    Number of children: Not on file    Years of education: Not on file    Highest education level: Not on file   Occupational History    Not on file   Tobacco  Use    Smoking status: Never    Smokeless tobacco: Never    Tobacco comments:     social in college    Vaping Use    Vaping status: Never Used   Substance and Sexual Activity    Alcohol use: Yes     Comment: OCASSIONAL    Drug use: Never    Sexual activity: Yes     Partners: Male     Birth control/protection: Vasectomy   Other Topics Concern    Not on file   Social History Narrative    Not on file     Social Drivers of Health     Food Insecurity: Not on file   Transportation Needs: Not on file   Stress: Not on file   Housing Stability: Not on file       PE:  The patient does appear in her stated age in no distress.  The patient is well groomed.    Psychiatric:  The patient is alert and oriented x 3.  The patient has a normal affect and mood.      Respiratory:  No acute respiratory distress. Patient does not have a cough.    HEENT:  Extraocular muscles are intact. There is no kern icterus. Pupils are equal, round, and reactive to light. No redness or discharge bilaterally.    Skin:  There are no rashes or lesions.    Vitals:  There were no vitals filed for this visit.    Gait:    Gait: Normal gait   Sit to Stand: no difficulty      Vascular lower extremity:   Dorsalis pedis pulse-RIGHT 2+   Dorsalis pedis pulse-LEFT 2+   Tibialis posterior pulse-RIGHT 2+   Tibialis posterior pulse-LEFT 2+     Neurological Lower Extremity:    Light Touch Sensation: Intact in bilateral Lower Extremities   LE Muscle Strength: All LE strength measurements 5/5   RIGHT plantar reflexes: downward response   LEFT plantar reflexes: downward response   Reflexes: 2+ in bilateral lower extremities     Assessment  1. right L4 and L5-S1 radiculopathy    2. L5-S1 left mod foraminal & mild-mod diffuse, L4-5 right foraminal & right lateral recess mild-mod, L3-4 mild central bulging discs    3. L5-S1 left mild-mod/right mild, L4-5 right mild foraminal stenosis    4. L4-5 right mild herniated disc    5. Attention deficit hyperactivity disorder (ADHD),  predominantly inattentive type    6. Anxiety    7. Asherman's syndrome       Plan  The patient will continue with PT.    The patient will continue with her home exercise program.    The patient does not need any injections at this time.    I reviewed with her the disease process and her work outs.    She will follow up in 6 months or sooner if needed.    Patient should call prior to next visit if new or worsening symptoms.     The patient understands and agrees with the stated plan.  Triston Aguilar MD  4/15/2025         [1]   Past Medical History:   Chronic pain of both knees    Formatting of this note might be different from the original. Last Assessment & Plan: She is training for marathon, notes pain in her knees that is interfering with her progress. No associated swelling or joint instability.  Plan:  Referred for PT for further evaluation and management.    Crushing injury of toe of right foot    Esophageal reflux    Eustachian tube dysfunction, bilateral    Lumbar disc herniation    Lumbar radicular pain    Tension-type headache, not intractable    Formatting of this note might be different from the original. Last Assessment & Plan: Off and on headaches for the last 2-3 weeks, predominantly around her temples, without associated visual change, nausea, vomiting, or light sensitivity.  Minimal caffeine intake.  Some increased stress, lost a friend to cancer, going through process of possibly having third child through surrogate.  Training for    [2]   Past Surgical History:  Procedure Laterality Date          Enlarge breast with implant      Enlarge breast with implant     [3] History reviewed. No pertinent family history.

## 2025-04-18 ENCOUNTER — OFFICE VISIT (OUTPATIENT)
Dept: PHYSICAL THERAPY | Facility: HOSPITAL | Age: 40
End: 2025-04-18
Attending: PHYSICAL MEDICINE & REHABILITATION
Payer: COMMERCIAL

## 2025-04-18 PROCEDURE — 97110 THERAPEUTIC EXERCISES: CPT | Performed by: PHYSICAL THERAPIST

## 2025-04-18 PROCEDURE — 97112 NEUROMUSCULAR REEDUCATION: CPT | Performed by: PHYSICAL THERAPIST

## 2025-04-18 PROCEDURE — 97530 THERAPEUTIC ACTIVITIES: CPT | Performed by: PHYSICAL THERAPIST

## 2025-04-18 NOTE — PROGRESS NOTES
Patient: Lorri Gunderson (39 year old, female) Referring Provider:  Insurance:   Diagnosis:   No ref. provider found  BCBS OUT OF STATE   Date of Surgery: No data recorded Next MD visit:  N/A   Precautions:    No data recorded Referral Information:    Date of Evaluation: Req: 0, Auth: 0, Exp:     No data recorded POC Auth Visits:  10       Today's Date   4/18/2025    Subjective  Reports she is out of acute pain.  Reports she is still having stiffness and pain on the R side.  Still has to do her lift correction when she stands ups.  States she is still not working out.  Reports she does better if she takes Medrol Dose Pack whent he pain starts.  States she isn't going to do ball slams.  Going to get her workouts modified.       Pain: 4/10     Objective  (+) L/R ADT/PADT         Assessment  No adverse effects to treatment.  No visible trunk shift today.  Continues to display a PEC pattern of postural compensation along with scolosis curvuture.  Educated the patient how heavy weight lifting with excessive spinal movement can contribute to flare ups of symptoms.  The pt was advseid to do self shift corrections and SANDRA activities before and after her workouts.  Modified her workouts accordingly.    Goals (to be met in 10 visits)      1.  The pt will be independent in their HEP.  2.  Centralization of symptoms to the lumbar spine.  3.  The pt will be independent in a modified workout program.  4.  The pt will be able to return to weight lifting without an increase in pain.  5.  The pt will be able to  an item off the floor without an increase in pain.            Plan  Continue PT - progress SANDRA activities now that symptoms are less acute    Treatment Last 4 Visits  Treatment Day: 3       4/10/2025 4/18/2025   Spine Treatment   Therapeutic Exercise - R side glides  - R side glides with lumbar extension - latera shift correction   Neuro Re-Education  - supine 90/90 with R arm OH and balloon  - L SL, resisted  R glut max       Manual Therapy - central PA's lower lumbar spine  - unilateral PA's lower lumbar spine  - STM B lumbar parapsinals    Therapeutic Activity - education on centralization of symptoms  - education on need to perform side glides several times per day  - education on positioning - education on modification of gym program  1.  Avoid ball slams  2.  Single leg dead lift instead of double leg  3.  Lunges/squats with back in a neutral position  4.  Avoiding OH movement over 10 lbs  5.  Skull crushers vs OH triceps   Therapeutic Exercise Minutes 15 8   Neuro Re-Educ Minutes  15   Manual Therapy Minutes 23    Therapeutic Activity Minutes 8 23   Total Time Of Timed Procedures 46 46   Total Time Of Service-Based Procedures 0 0   Total Treatment Time 46 46   HEP - R side glides  - R side glides with lumbar extension - lateral shift correction  - supine 90/90 with R arm OH and balloon  - L SL, resisted R glut max            HEP  - lateral shift correction  - supine 90/90 with R arm OH and balloon  - L SL, resisted R glut max        Charges  TE1, NM1, TA2

## 2025-04-22 ENCOUNTER — OFFICE VISIT (OUTPATIENT)
Dept: PHYSICAL THERAPY | Facility: HOSPITAL | Age: 40
End: 2025-04-22
Attending: PHYSICAL MEDICINE & REHABILITATION
Payer: COMMERCIAL

## 2025-04-22 PROCEDURE — 97530 THERAPEUTIC ACTIVITIES: CPT | Performed by: PHYSICAL THERAPIST

## 2025-04-22 PROCEDURE — 97112 NEUROMUSCULAR REEDUCATION: CPT | Performed by: PHYSICAL THERAPIST

## 2025-04-22 NOTE — PROGRESS NOTES
Patient: Lorri Gunderson (39 year old, female) Referring Provider:  Insurance:   Diagnosis: L4-5 right mild herniated disc  (primary encounter diagnosis)  L5-S1 left mild-mod/right mild, L4-5 right mild foraminal stenosis  L5-S1 left mod foraminal & mild-mod diffuse, L4-5 right foraminal & right lateral recess mild-mod, L3-4 mild central bulging discs  Adolescent idiopathic scoliosis of thoracolumbar region  Anxiety  Asherman's syndrome  right L4 and L5-S1 radiculopathy  Dr. Aguilar [facility] OUT OF STATE   Date of Surgery: No data recorded Next MD visit:  N/A   Precautions:  scoliosis  No data recorded Referral Information:    Date of Evaluation: Req: 0, Auth: 0, Exp:     No data recorded POC Auth Visits:  10       Today's Date   4/22/2025    Subjective  Feels like the stiffness is decreasing.  Feels her cycle is off.  Reports her neck is better.       Pain: 2/10     Objective       Increased thoracic curvature R with protracted scapula        Assessment  No adverse effects to treatment.  Able to progress to frontal planes control activites.  Added more activities to improve scolosis curvature on hands and kmees.  Updated HEP.    Goals (to be met in 10 visits)      1.  The pt will be independent in their HEP.  2.  Centralization of symptoms to the lumbar spine.  3.  The pt will be independent in a modified workout program.  4.  The pt will be able to return to weight lifting without an increase in pain.  5.  The pt will be able to  an item off the floor without an increase in pain.                Plan  Continue PT - progress SANDRA activities now that symptoms are less acute    Treatment Last 4 Visits  Treatment Day: 4       4/10/2025 4/18/2025 4/22/2025   Spine Treatment   Therapeutic Exercise - R side glides  - R side glides with lumbar extension - latera shift correction    Neuro Re-Education  - supine 90/90 with R arm OH and balloon  - L SL, resisted R glut max     - 90/90 with R LE lift  - R SL adductor  pull back  - L SL R glut max  - L SL knee towards knee  - all 4 R glut max  - all 4 R arm reach     Manual Therapy - central PA's lower lumbar spine  - unilateral PA's lower lumbar spine  - STM B lumbar parapsinals     Therapeutic Activity - education on centralization of symptoms  - education on need to perform side glides several times per day  - education on positioning - education on modification of gym program  1.  Avoid ball slams  2.  Single leg dead lift instead of double leg  3.  Lunges/squats with back in a neutral position  4.  Avoiding OH movement over 10 lbs  5.  Skull crushers vs OH triceps - education on need for elongation of spinal curves  - education on activities to avoid at the gym to avoid flare ups in pain   Therapeutic Exercise Minutes 15 8    Neuro Re-Educ Minutes  15 38   Manual Therapy Minutes 23     Therapeutic Activity Minutes 8 23 8   Total Time Of Timed Procedures 46 46 46   Total Time Of Service-Based Procedures 0 0 0   Total Treatment Time 46 46 46   HEP - R side glides  - R side glides with lumbar extension - lateral shift correction  - supine 90/90 with R arm OH and balloon  - L SL, resisted R glut max     - 90/90 with R LE lift  - R SL adductor pull back  - L SL R glut max  - L SL knee towards knee  - all 4 R glut max  - all 4 R arm reach        HEP  - 90/90 with R LE lift  - R SL adductor pull back  - L SL R glut max  - L SL knee towards knee  - all 4 R glut max  - all 4 R arm reach    Charges  NM3, TA1

## 2025-05-02 ENCOUNTER — OFFICE VISIT (OUTPATIENT)
Dept: PHYSICAL THERAPY | Facility: HOSPITAL | Age: 40
End: 2025-05-02
Attending: PHYSICAL MEDICINE & REHABILITATION
Payer: COMMERCIAL

## 2025-05-02 PROCEDURE — 97112 NEUROMUSCULAR REEDUCATION: CPT | Performed by: PHYSICAL THERAPIST

## 2025-05-02 PROCEDURE — 97530 THERAPEUTIC ACTIVITIES: CPT | Performed by: PHYSICAL THERAPIST

## 2025-05-03 NOTE — PROGRESS NOTES
Patient: Lorri Gunderson (39 year old, female) Referring Provider:  Insurance:   Diagnosis:   No ref. provider found  [employer] OUT OF STATE   Date of Surgery: No data recorded Next MD visit:  N/A   Precautions:    No data recorded Referral Information:    Date of Evaluation: Req: 0, Auth: 0, Exp:     No data recorded POC Auth Visits:  10       Today's Date   5/2/2025    Subjective  States she worked out this morning.  Went back to [facility] on Monday.  States she is doing the modified workouts       Pain: 2/10     Objective               Assessment  No adverse effects to treamtent.  The pt reported less pain with modified workouts.  Reviewed her gym based program for form and focused on increasing serratus anterior along with IO/TA activities during exercises.  Updated HEP    Goals (to be met in 10 visits)      1.  The pt will be independent in their HEP.  2.  Centralization of symptoms to the lumbar spine.  3.  The pt will be independent in a modified workout program.  4.  The pt will be able to return to weight lifting without an increase in pain.  5.  The pt will be able to  an item off the floor without an increase in pain.                    Plan  Continue PT - progress SANDRA activities now that symptoms are less acute    Treatment Last 4 Visits  Treatment Day: 5       4/10/2025 4/18/2025 4/22/2025 5/2/2025   Spine Treatment   Therapeutic Exercise - R side glides  - R side glides with lumbar extension - latera shift correction     Neuro Re-Education  - supine 90/90 with R arm OH and balloon  - L SL, resisted R glut max     - 90/90 with R LE lift  - R SL adductor pull back  - L SL R glut max  - L SL knee towards knee  - all 4 R glut max  - all 4 R arm reach   - TRX  bands - two positions with SA/IO/TA  - lower abd - ball's tucks  - alternating triceps with Tband  - bear planks  - 1/2 planks  - goblet squat form       Manual Therapy - central PA's lower lumbar spine  - unilateral PA's lower lumbar spine  -  STM B lumbar parapsinals      Therapeutic Activity - education on centralization of symptoms  - education on need to perform side glides several times per day  - education on positioning - education on modification of gym program  1.  Avoid ball slams  2.  Single leg dead lift instead of double leg  3.  Lunges/squats with back in a neutral position  4.  Avoiding OH movement over 10 lbs  5.  Skull crushers vs OH triceps - education on need for elongation of spinal curves  - education on activities to avoid at the gym to avoid flare ups in pain - education on activities to avoid at the gym   Therapeutic Exercise Minutes 15 8     Neuro Re-Educ Minutes  15 38 38   Manual Therapy Minutes 23      Therapeutic Activity Minutes 8 23 8 8   Total Time Of Timed Procedures 46 46 46 46   Total Time Of Service-Based Procedures 0 0 0 0   Total Treatment Time 46 46 46 46   HEP - R side glides  - R side glides with lumbar extension - lateral shift correction  - supine 90/90 with R arm OH and balloon  - L SL, resisted R glut max     - 90/90 with R LE lift  - R SL adductor pull back  - L SL R glut max  - L SL knee towards knee  - all 4 R glut max  - all 4 R arm reach         HEP  - 90/90 with R LE lift  - R SL adductor pull back  - L SL R glut max  - L SL knee towards knee  - all 4 R glut max  - all 4 R arm reach    Charges  NM3, TA1

## 2025-05-12 RX ORDER — CYCLOBENZAPRINE HCL 10 MG
10 TABLET ORAL NIGHTLY
Qty: 30 TABLET | Refills: 0 | Status: SHIPPED | OUTPATIENT
Start: 2025-05-12

## 2025-05-12 NOTE — TELEPHONE ENCOUNTER
Refill Request    LOV:4/15/2025 Triston Aguilar MD   Due back to clinic per last office note:  6 months or sooner if needed   NOV: Visit date not found     Urine drug screen (if applicable): n/a  Pain contract: n/a     Medication request:   Requested Prescriptions     Pending Prescriptions Disp Refills    CYCLOBENZAPRINE 10 MG Oral Tab [Pharmacy Med Name: CYCLOBENZAPRINE 10 MG TABLET] 30 tablet 0     Sig: TAKE 1 TABLET BY MOUTH EVERY DAY AT NIGHT           [place]: 4/8/25 #30 (30 days supply)    LOV plan (if weaning or changing medications): Not mentioned.     Per Dr. Martinez in 4/8/25 tele encounter: \"The patient called after hours with complaints of an exacerbation of low back pain today with radiation to the right buttock, slightly below.  She has episodes like this periodically.  These respond to a Medrol Dosepak and muscle relaxers.  She has been trying to do intense workouts and has been noticing more flareups of neck and back pain.  Today I recommended cutting back on intense workouts for 3 days, taking the oral medications and calling the office if no better on Thursday or Friday.\"

## 2025-06-17 ENCOUNTER — TELEPHONE (OUTPATIENT)
Dept: PHYSICAL MEDICINE AND REHAB | Facility: CLINIC | Age: 40
End: 2025-06-17

## 2025-06-17 NOTE — TELEPHONE ENCOUNTER
Pt is in severe pain but is traveling out of state with son, Pt is in desperate need of some relief. Please return call to [phone]

## 2025-06-18 NOTE — TELEPHONE ENCOUNTER
Spoke with patient who stated she already has a medrol dose pack in case her pain gets really bad. Pain is in the right low back/hip/buttock. Patient wanting to hold off on the oral steroid as she would like to get a back injection instead. Currently taking Ibuprofen, Tylenol, lidocaine patches and flexeril for the past 2 nights. States this pain flare up started on Monday morning.    Patient aware  is going to be out of the office next week.     Per  on 9/30/24: \"We spoke about her having a right L5 TFESI, but she has decided to hold on it for now. \"  No mention of back injection at last office visit 4/15/25.  Patient completed lumbar MRI at outside facility on 12/11/24.  Next office visit: none scheduled    Message forwarded on to  to see if process can be started for back injection.

## 2025-06-19 ENCOUNTER — TELEPHONE (OUTPATIENT)
Dept: INTERNAL MEDICINE CLINIC | Facility: CLINIC | Age: 40
End: 2025-06-19

## 2025-06-19 ENCOUNTER — OFFICE VISIT (OUTPATIENT)
Dept: PHYSICAL THERAPY | Facility: HOSPITAL | Age: 40
End: 2025-06-19
Attending: PHYSICAL MEDICINE & REHABILITATION
Payer: COMMERCIAL

## 2025-06-19 ENCOUNTER — OFFICE VISIT (OUTPATIENT)
Dept: PHYSICAL MEDICINE AND REHAB | Facility: CLINIC | Age: 40
End: 2025-06-19
Payer: COMMERCIAL

## 2025-06-19 VITALS — WEIGHT: 127 LBS | HEIGHT: 65 IN | BODY MASS INDEX: 21.16 KG/M2

## 2025-06-19 DIAGNOSIS — M51.26 LUMBAR HERNIATED DISC: ICD-10-CM

## 2025-06-19 DIAGNOSIS — M48.061 LUMBAR FORAMINAL STENOSIS: ICD-10-CM

## 2025-06-19 DIAGNOSIS — N85.6 ASHERMAN'S SYNDROME: ICD-10-CM

## 2025-06-19 DIAGNOSIS — M54.16 LUMBAR RADICULOPATHY: Primary | ICD-10-CM

## 2025-06-19 DIAGNOSIS — F41.9 ANXIETY: ICD-10-CM

## 2025-06-19 DIAGNOSIS — M51.9 LUMBAR DISC DISEASE: ICD-10-CM

## 2025-06-19 DIAGNOSIS — F90.0 ATTENTION DEFICIT HYPERACTIVITY DISORDER (ADHD), PREDOMINANTLY INATTENTIVE TYPE: ICD-10-CM

## 2025-06-19 PROCEDURE — 97110 THERAPEUTIC EXERCISES: CPT | Performed by: PHYSICAL THERAPIST

## 2025-06-19 PROCEDURE — 99214 OFFICE O/P EST MOD 30 MIN: CPT | Performed by: PHYSICAL MEDICINE & REHABILITATION

## 2025-06-19 PROCEDURE — 97140 MANUAL THERAPY 1/> REGIONS: CPT | Performed by: PHYSICAL THERAPIST

## 2025-06-19 PROCEDURE — 3008F BODY MASS INDEX DOCD: CPT | Performed by: PHYSICAL MEDICINE & REHABILITATION

## 2025-06-19 PROCEDURE — 97530 THERAPEUTIC ACTIVITIES: CPT | Performed by: PHYSICAL THERAPIST

## 2025-06-19 NOTE — PROGRESS NOTES
Patient: Lorri Gunderson (39 year old, female) Referring Provider:  Insurance:   Diagnosis:   No ref. provider found  BCBS OUT OF STATE   Date of Surgery: No data recorded Next MD visit:  N/A   Precautions:    No data recorded Referral Information:    Date of Evaluation: Req: 0, Auth: 0, Exp:     No data recorded POC Auth Visits:  10       Today's Date   6/19/2025    Subjective  Reports she was out of town for a sport tournament and fetl her back flare up.  State her back was bothering per prior to leaving and the car ride made it worse.  Feels like she is going to get stuck again and won't be able to move.  States she has been doing her HEP and taking Advil.  Hasn't started the Medrol Dose Pack yet because she doesn't like taking those.  Doesn't want to continue to be in this cycle of pain.       Pain: 8/10     Objective  TTP over the L5S1 spinous process       Lumbar ROM:  flexion limited 75%, extension limited 25% in the lower lumbar spine    LE strength:  5/5 throughout     Assessment  Lorri Wyatt has completed 6 visits of PT and has changed her workouts and is more consicious of her bending/lifting position.  She continues to have frequent flare ups of centeral pain and now has developed intermittent L and R LE radicular pain that is intermittent in nature.  She partially responds to extension based activities and has doesn't SANDRA activites in the frontal plane which do help her pain.  The pt will return to the doctor for further guidence. Will continue PT as tolerated.    Goals (to be met in 10 visits)   1.  The pt will be independent in their HEP.  MET 6/19/2025  2.  Centralization of symptoms to the lumbar spine.  PROGRESSING 6/19/2025  3.  The pt will be independent in a modified workout program.  MET 6/19/2025  4.  The pt will be able to return to weight lifting without an increase in pain.  PROGRESSING 6/19/2025  5.  The pt will be able to  an item off the floor without an increase in  pain.  PROGRESSING 6/19/2025     Plan  Continue PT - progress SANDRA activities once symptoms are less acute, continue Justin progression    Treatment Last 4 Visits  Treatment Day: 6       4/18/2025 4/22/2025 5/2/2025 6/19/2025   Spine Treatment   Therapeutic Exercise - latera shift correction   - PPU's  - PPU's to end ROM with sag  - prone lying   Neuro Re-Education - supine 90/90 with R arm OH and balloon  - L SL, resisted R glut max     - 90/90 with R LE lift  - R SL adductor pull back  - L SL R glut max  - L SL knee towards knee  - all 4 R glut max  - all 4 R arm reach   - TRX  bands - two positions with SA/IO/TA  - lower abd - ball's tucks  - alternating triceps with Tband  - bear planks  - 1/2 planks  - goblet squat form        Manual Therapy    - central PA's  - lumbar rocking  - STM B QL's   Therapeutic Activity - education on modification of gym program  1.  Avoid ball slams  2.  Single leg dead lift instead of double leg  3.  Lunges/squats with back in a neutral position  4.  Avoiding OH movement over 10 lbs  5.  Skull crushers vs OH triceps - education on need for elongation of spinal curves  - education on activities to avoid at the gym to avoid flare ups in pain - education on activities to avoid at the gym - education on disc mechanics and healing times  - education on avoid prolonged sitting  - education on avoiding BLT until symptoms calm fown   Therapeutic Exercise Minutes 8   15   Neuro Re-Educ Minutes 15 38 38    Manual Therapy Minutes    23   Therapeutic Activity Minutes 23 8 8 10   Total Time Of Timed Procedures 46 46 46 48   Total Time Of Service-Based Procedures 0 0 0 0   Total Treatment Time 46 46 46 48   HEP - lateral shift correction  - supine 90/90 with R arm OH and balloon  - L SL, resisted R glut max     - 90/90 with R LE lift  - R SL adductor pull back  - L SL R glut max  - L SL knee towards knee  - all 4 R glut max  - all 4 R arm reach  - PPU's  - PPU's to end ROM with sag  - prone  lying        HEP  - PPU's  - PPU's to end ROM with sag  - prone lying    Charges  TE1, Man2, TA!

## 2025-06-19 NOTE — PROGRESS NOTES
Low Back Pain H & P    Chief Complaint:   Chief Complaint   Patient presents with    Follow - Up     /25 pt is here with complaints of back pain and states it radiates down into the right hip. Also has complaints of sciatica in her LLE ( feels like an electronic shock ) . Currently in physical therapy. Pain 7/10     Nursing note reviewed and verified.    History of Present Illness  Lorri Gunderson is a 39 year old female with a history of back pain who presents with worsening back pain after recent travel and activity.    She experienced a flare-up of back pain that began on Monday morning following a weekend camping trip. The pain started as a feeling of tightness in her back while packing and unpacking and worsened significantly after a six-hour drive to [place], [place], for her son's lacrosse tournament. The pain is located in the center of her back, radiating to the right side, described as an 'aching tightness'. Pain intensity ranges from 5 to 7 on a scale of 0 to 10, currently at 6. Sharp pain occurs with sneezing or coughing, and sitting and bending over exacerbate the pain.    She also experiences a sharp, nervy sensation in the left upper calf and occasional tingling in the bottoms of her feet when walking. No numbness or tingling in legs or feet, except for occasional tingling in the bottoms of her feet when walking. No weakness in legs, but she reports feeling weak in her core muscles.    Her current medications include Advil, Tylenol, and Flexeril at night. She has not started oral steroids due to concerns about side effects but received a Toradol injection earlier today, which provided minimal relief. She has been performing exercises recommended by her physical therapist and using a lumbar support in her car. She has a history of similar episodes, with the last significant flare-up occurring in March. Despite modifying her activities and being cautious with her back, she is concerned  about the increasing frequency of these episodes. She has previously used Medrol dose packs, which have been effective but caused menstrual irregularities.    She is actively involved in her children's sports activities, which require frequent travel. She is planning to travel again soon for another lacrosse tournament with her children, which is causing her concern about managing her back pain during the trip.    Description of the Pain  The pain is located in the central and right > left low back.    The pain radiates to the right buttock and left lateral lower leg..  The pain at its best is 5/10. The pain at its worst is 7/10. The pain is currently  6/10.  The pain is described as a(n) aching, tightness, and sharp sensation.    The pain is worse when sneezing, when coughing, when bending, and sitting.    The pain is better with nothing.  There is no numbness.  There is tingling in the bilateral sole of the foot.  She had this once when she was walking.  There is not weakness in both legs.     Past Medical History   Past Medical History[1]    Past Surgical History   Past Surgical History[2]    Family History   Family History[3]    Social History   Social History     Socioeconomic History    Marital status:      Spouse name: Not on file    Number of children: Not on file    Years of education: Not on file    Highest education level: Not on file   Occupational History    Not on file   Tobacco Use    Smoking status: Never    Smokeless tobacco: Never    Tobacco comments:     social in college    Vaping Use    Vaping status: Never Used   Substance and Sexual Activity    Alcohol use: Yes     Comment: OCASSIONAL    Drug use: Never    Sexual activity: Yes     Partners: Male     Birth control/protection: Vasectomy   Other Topics Concern    Not on file   Social History Narrative    Not on file     Social Drivers of Health     Food Insecurity: Not on file   Transportation Needs: Not on file   Stress: Not on file    Housing Stability: Not on file       PE:  The patient does appear in her stated age in moderate distress.  The patient is well groomed.    Psychiatric:  The patient is alert and oriented x 3.  The patient is anxious.      Respiratory:  No acute respiratory distress. Patient does not have a cough.    HEENT:  Extraocular muscles are intact. There is no kern icterus. Pupils are equal, round, and reactive to light. No redness or discharge bilaterally.    Skin:  There are no rashes or lesions.    Vitals:  There were no vitals filed for this visit.    Gait:    Gait: Normal gait   Sit to Stand: no difficulty      Lumbar Spine:    Scoliosis: Left shift that is mild     Lumbar Spine Palpation:    Spinous Processes: Non-tender for all Spinous Processes   Z-joints: Tender at  right L4-5   SIJ: Tender at right > left SIJ   Piriformis Muscle: Non-tender bilateral Piriformis muscles   Greater Trochanteric Bursa: Non-tender for bilateral Greater Trochanteric Bursa     Vascular lower extremity:   Dorsalis pedis pulse-RIGHT 2+   Dorsalis pedis pulse-LEFT 2+   Tibialis posterior pulse-RIGHT 2+   Tibialis posterior pulse-LEFT 2+     Neurological Lower Extremity:    Light Touch Sensation: Intact in bilateral Lower Extremities   LE Muscle Strength: All LE strength measurements 5/5 except:  Hamstring RIGHT:   4-/5  Hamstring LEFT:   4/5   RIGHT plantar reflexes: downward response   LEFT plantar reflexes: downward response   Reflexes: 2+ in bilateral lower extremities     Assessment  1. right L4 and L5-S1 radiculopathy    2. L5-S1 left mod foraminal & mild-mod diffuse, L4-5 right foraminal & right lateral recess mild-mod, L3-4 mild central bulging discs    3. L5-S1 left mild-mod/right mild, L4-5 right mild foraminal stenosis    4. L4-5 right mild herniated disc    5. Anxiety    6. Attention deficit hyperactivity disorder (ADHD), predominantly inattentive type    7. Asherman's syndrome         Plan  Assessment & Plan  Low back pain with  radiculopathy  Acute exacerbation of chronic low back pain with radiculopathy, primarily right-sided. Pain radiates to left upper calf and right buttock, worsened by sitting, driving, bending. Current pain 6/10. Previous treatments minimally effective. Discussed oral steroids, higher anti-inflammatory doses, epidural injections. She hesitated on oral steroids due to side effects. Epidural injections offer longer relief. PRP injection discussed but not covered by insurance.  - Consider Medrol Dosepak if pain persists.  - Increase ibuprofen to 800 mg TID for one week if needed.  - Consider Aleve, two tablets BID, as alternative to ibuprofen.  - Schedule epidural injection if oral steroids provide temporary relief.  - Discuss potential PRP injection for long-term relief.    Menstrual irregularity due to steroids  Menstrual irregularities and mood changes noted with previous steroid use. Concern about recurrence with current treatment.  - Monitor menstrual cycle for irregularities if oral steroids are taken.  - Discuss potential side effects of steroids, including menstrual irregularities and weight gain.    Return in about 3 months (around 9/19/2025) for medication management, assessment after therapy.     Patient should call prior to next visit if new or worsening symptoms.     The patient understands and agrees with the stated plan.  Triston Aguilar MD  6/19/2025           [1]   Past Medical History:   Chronic pain of both knees    Formatting of this note might be different from the original. Last Assessment & Plan: She is training for marathon, notes pain in her knees that is interfering with her progress. No associated swelling or joint instability.  Plan:  Referred for PT for further evaluation and management.    Crushing injury of toe of right foot    Esophageal reflux    Eustachian tube dysfunction, bilateral    Lumbar disc herniation    Lumbar radicular pain    Tension-type headache, not intractable     Formatting of this note might be different from the original. Last Assessment & Plan: Off and on headaches for the last 2-3 weeks, predominantly around her temples, without associated visual change, nausea, vomiting, or light sensitivity.  Minimal caffeine intake.  Some increased stress, lost a friend to cancer, going through process of possibly having third child through surrogate.  Training for    [2]   Past Surgical History:  Procedure Laterality Date          Enlarge breast with implant      Enlarge breast with implant     [3] History reviewed. No pertinent family history.

## 2025-06-19 NOTE — TELEPHONE ENCOUNTER
Left message to call back.    Spoke with Dr.Couri castillo to add patient on to today at 3:30 pm or 4 pm today.

## 2025-06-23 ENCOUNTER — TELEPHONE (OUTPATIENT)
Dept: PHYSICAL MEDICINE AND REHAB | Facility: CLINIC | Age: 40
End: 2025-06-23

## 2025-06-23 DIAGNOSIS — M47.816 LUMBAR SPONDYLOSIS: ICD-10-CM

## 2025-06-23 DIAGNOSIS — M48.061 LUMBAR FORAMINAL STENOSIS: ICD-10-CM

## 2025-06-23 DIAGNOSIS — M54.16 LUMBAR RADICULOPATHY: Primary | ICD-10-CM

## 2025-06-23 DIAGNOSIS — M51.9 LUMBAR DISC DISEASE: ICD-10-CM

## 2025-06-23 DIAGNOSIS — M51.26 LUMBAR HERNIATED DISC: ICD-10-CM

## 2025-06-23 NOTE — TELEPHONE ENCOUNTER
Patient called requesting to speak to a nurse she is in a lot of pain she would like a call back to [phone] please

## 2025-06-24 NOTE — TELEPHONE ENCOUNTER
Per Dr. Aguilar: \"RIGHT L4 and L5 TFESI's if Dr. Martinez is ok with doing it? If she is having bilateral symptoms then change to Bilateral L5 TFESI.\"    Orders placed as indicated by Dr. Aguilar.     Will need to verify with Dr. Martinez if authorized to place on his schedule.

## 2025-06-24 NOTE — TELEPHONE ENCOUNTER
Incoming call regarding significant pain. Patient requesting injection from Dr. Aguilar, patient notes she has plans to travel on 07/02/2025. Patient requesting an injection prior to this date. Patient aware that Dr. Aguilar is out of the office this week.    RN reiterated Dr. Aguilar's plan per LOV (06/19/2025) note: \"- Consider Medrol Dosepak if pain persists.  - Increase ibuprofen to 800 mg TID for one week if needed.  - Consider Aleve, two tablets BID, as alternative to ibuprofen.  - Schedule epidural injection if oral steroids provide temporary relief.  - Discuss potential PRP injection for long-term relief\"     Pt declined these instructions, pt stating that Dr. Aguilar and a neurosurgeon recommended against oral steroids d/t delaying treatment. Pt also notes significant side effects from oral steroids.       RN informed patient that Dr. Aguilar did not place an order for an epidural injection nor did his LOV note provide specific instructions on the intended injection itself. RN also informed pt that Dr. Aguilar's next available date for an injection. Patient requesting Dr. Martinez do the injection. RN informed patient of office policy that a MILAD/physician permission is needed prior to switching doctors.     RN informed patient that reaching out to Dr. Aguilar directly will be attempted for further instruction/orders. Pt verbalized understanding and was thankful.

## 2025-06-25 ENCOUNTER — OFFICE VISIT (OUTPATIENT)
Dept: PHYSICAL THERAPY | Facility: HOSPITAL | Age: 40
End: 2025-06-25
Attending: PHYSICAL MEDICINE & REHABILITATION
Payer: COMMERCIAL

## 2025-06-25 PROCEDURE — 97530 THERAPEUTIC ACTIVITIES: CPT | Performed by: PHYSICAL THERAPIST

## 2025-06-25 PROCEDURE — 97140 MANUAL THERAPY 1/> REGIONS: CPT | Performed by: PHYSICAL THERAPIST

## 2025-06-25 RX ORDER — BACLOFEN 10 MG/1
10 TABLET ORAL 3 TIMES DAILY
Qty: 90 TABLET | Refills: 1 | OUTPATIENT
Start: 2025-06-25

## 2025-06-25 NOTE — TELEPHONE ENCOUNTER
Spoke with patient requesting bilateral injections.     Order placed for bilateral L5 Transforaminal Epidural Steroid Injection(and not + S1)

## 2025-06-25 NOTE — TELEPHONE ENCOUNTER
Spoke with patient to schedule appointment at [facility], throughout the protocol.   What levels are being injected? Does the medication also go to the left side? Took 1 Diclofenac last night.  Patient notified Right L4 and L5 Transforaminal Epidural Steroid Injection. Patient also requested levels be changed to L5 and S1 as seen by a surgeon yesterday who recommended levels L5 + S1, as those levels could be root of her problem.  Notified patient if any levels were to be changed she will be need to be evaluated in office by physician.     Patient has been scheduled for Right L4 and L5 Transforaminal Epidural Steroid Injection on 6/26/2025 at the [facility] with Dr. Martinez.   Anesthesia type:  Local  Please note: The [facility] will call the business day prior to discuss the exact time/arrival and additional instructions for your appointment.  Patient was advised that if he/she does receive the covid vaccine it needs to be at least 2 weeks before or after the injection.  Medications and allergies reviewed.  Educated to hold NSAIDS (Aleve, Ibuprofen, Motrin, Advil) and anti-inflammatories (Meloxicam, Naproxen, Diclofenac, Celebrex) and for cervical injections must hold Multi-Vitamins, Vitamin E, Fish Oil/Omega-3.  Patient informed of [facility]'s  policy:  The patient will require transportation arrangements to and from the procedure, with the  present on site for the entire visit.  Without a , the appointment is subject to cancellation.    [facility] is located in the [facility] 1st floor [address] [place] [address].   may park in the yellow/purple parking lot.  Patient verbalized understanding and agrees with plan.  Scheduled in Epic: Yes  Scheduled in Surgical Case: Yes  Follow up appointment made: NOV: 9/23/2025 Triston Aguilar MD  Authorization date valid until 7/9/2025 at [facility].

## 2025-06-25 NOTE — TELEPHONE ENCOUNTER
I contacted [employer] to see if they can update the case and approve since the previous case was withdrawn.  I spoke with Katie and she will update the case to authorized.

## 2025-06-25 NOTE — TELEPHONE ENCOUNTER
Initiated authorization for Bilateral L5 TFESI under fluoroscopic guidance. CPT/HCPCS 45539-62 dx:M54.16 to be done at [facility] with [employer] portal.    Status: Pending  Case # [id number]  Anticipated Determination Date: 06/27/2025

## 2025-06-25 NOTE — TELEPHONE ENCOUNTER
Refill Request    Medication request: baclofen 10 MG Oral Tab Take 1 tablet (10 mg total) by mouth 3 (three) times daily.     LOV:6/19/2025 Triston Aguilar MD   Due back to clinic per last office note:  \"Return in about 3 months (around 9/19/2025) for medication management, assessment after therapy. \"  NOV: 9/23/2025 Triston Aguilar MD      [place] 5/21/25 #90 - 30 day supply R-1    Urine drug screen (if applicable): n/a  Pain contract: n/a    LOV plan (if weaning or changing medications): Per  at last office visit: \"- Consider Medrol Dosepak if pain persists.  - Increase ibuprofen to 800 mg TID for one week if needed.  - Consider Aleve, two tablets BID, as alternative to ibuprofen.  - Schedule epidural injection if oral steroids provide temporary relief.  - Discuss potential PRP injection for long-term relief.\"

## 2025-06-25 NOTE — PROGRESS NOTES
Patient: Lorri Gunderson (39 year old, female) Referring Provider:  Insurance:   Diagnosis:   No ref. provider found  BCBS OUT OF STATE   Date of Surgery: No data recorded Next MD visit:  N/A   Precautions:    No data recorded Referral Information:    Date of Evaluation: Req: 0, Auth: 0, Exp:     No data recorded POC Auth Visits:  10       Today's Date   6/25/2025    Subjective  Reports she saw Dr. Lux who  suggested a disc replacement.  States she is getting an injection from Dr. Juan diallo.       Pain: 7/10     Objective          TTP over the L5S1 segment     Assessment  No adverse effects to treatment. Restricted mobility in the L5S1 segement with myospasm in the lumbar spine.  Antalgic gait with wide EMMY with anterior pelvic tilt.    Goals (to be met in 10 visits)   1.  The pt will be independent in their HEP.  MET 6/19/2025  2.  Centralization of symptoms to the lumbar spine.  PROGRESSING 6/19/2025  3.  The pt will be independent in a modified workout program.  MET 6/19/2025  4.  The pt will be able to return to weight lifting without an increase in pain.  PROGRESSING 6/19/2025  5.  The pt will be able to  an item off the floor without an increase in pain.  PROGRESSING 6/19/2025         Plan  Continue PT - progress SANDRA activities once symptoms are less acute, continue Justin progression    Treatment Last 4 Visits  Treatment Day: 7       4/22/2025 5/2/2025 6/19/2025 6/25/2025   Spine Treatment   Therapeutic Exercise   - PPU's  - PPU's to end ROM with sag  - prone lying    Neuro Re-Education - 90/90 with R LE lift  - R SL adductor pull back  - L SL R glut max  - L SL knee towards knee  - all 4 R glut max  - all 4 R arm reach   - TRX  bands - two positions with SA/IO/TA  - lower abd - ball's tucks  - alternating triceps with Tband  - bear planks  - 1/2 planks  - goblet squat form         Manual Therapy   - central PA's  - lumbar rocking  - STM B QL's - Central PA's L4L5 and L5S1  - lumbar  rocking  - STM B QL's   Therapeutic Activity - education on need for elongation of spinal curves  - education on activities to avoid at the gym to avoid flare ups in pain - education on activities to avoid at the gym - education on disc mechanics and healing times  - education on avoid prolonged sitting  - education on avoiding BLT until symptoms calm fown - education on spinal surgery recovery  - education on spinal mechanics   Therapeutic Exercise Minutes   15    Neuro Re-Educ Minutes 38 38     Manual Therapy Minutes   23 38   Therapeutic Activity Minutes 8 8 10 10   Total Time Of Timed Procedures 46 46 48 48   Total Time Of Service-Based Procedures 0 0 0 0   Total Treatment Time 46 46 48 48   HEP - 90/90 with R LE lift  - R SL adductor pull back  - L SL R glut max  - L SL knee towards knee  - all 4 R glut max  - all 4 R arm reach  - PPU's  - PPU's to end ROM with sag  - prone lying         HEP  - PPU's  - PPU's to end ROM with sag  - prone lying    Charges  Man3, TA1

## 2025-06-25 NOTE — TELEPHONE ENCOUNTER
Initiated authorization for Right L4 and right L5 TFESI under fluoroscopic guidance. CPT/HCPCS 88850, 30898 dx:M54.16 to be done at [facility].    Status: Approved  Reference/Authorization # [id number]  Valid: 06/25/2025 - 07/09/2025  Authorization is not a guarantee of payment and may be subject to review once claim is submitted.     PLEASE INFORM THE PATIENT TO CONTACT THE OFFICE IF THE INSURANCE CHANGES AS HE/SHE IS RESPONSIBLE TO UPDATE THE OFFICE IF INSURANCE CHANGES SO THAT PROCEDURE IS BILLED CORRECTLY.         ambulatory

## 2025-06-25 NOTE — TELEPHONE ENCOUNTER
Status: Approved for Bilateral L5 TFESI  Reference/Authorization # [id number]  Valid: 06/25/2025 - 07/09/2025  Authorization is not a guarantee of payment and may be subject to review once claim is submitted.     PLEASE INFORM THE PATIENT TO CONTACT THE OFFICE IF THE INSURANCE CHANGES AS HE/SHE IS RESPONSIBLE TO UPDATE THE OFFICE IF INSURANCE CHANGES SO THAT PROCEDURE IS BILLED CORRECTLY.

## 2025-07-11 ENCOUNTER — OFFICE VISIT (OUTPATIENT)
Dept: PHYSICAL THERAPY | Facility: HOSPITAL | Age: 40
End: 2025-07-11
Attending: PHYSICAL MEDICINE & REHABILITATION
Payer: COMMERCIAL

## 2025-07-11 PROCEDURE — 97530 THERAPEUTIC ACTIVITIES: CPT | Performed by: PHYSICAL THERAPIST

## 2025-07-11 PROCEDURE — 97110 THERAPEUTIC EXERCISES: CPT | Performed by: PHYSICAL THERAPIST

## 2025-07-11 NOTE — PROGRESS NOTES
Patient: Lorri Gunderson (39 year old, female) Referring Provider:  Insurance:   Diagnosis:   No ref. provider found  BCBS OUT OF STATE   Date of Surgery: No data recorded Next MD visit:  N/A   Precautions:    No data recorded Referral Information:    Date of Evaluation: Req: 0, Auth: 0, Exp:     No data recorded POC Auth Visits:  10       Today's Date   7/11/2025    Subjective  Reports she has been traveling.  Had the MELISSA didn't make much different.  Has been reading some books on low back pain.  States her pain always happens during types of stress.       Pain: 4/10     Objective       Non-antalgic gait pattern this date with hyperlordotic posture and B lower rib flares     Assessment  No adverse effects to treatment.  Reviewed the patient's program she found online and advised her to keep her rib cage.  Will resume previous SANDRA exercises and Shroth exercises.  Extensively reviewed hiphinging this date.    Goals (to be met in 10 visits)   1.  The pt will be independent in their HEP.  MET 6/19/2025  2.  Centralization of symptoms to the lumbar spine.  PROGRESSING 6/19/2025  3.  The pt will be independent in a modified workout program.  MET 6/19/2025  4.  The pt will be able to return to weight lifting without an increase in pain.  PROGRESSING 6/19/2025  5.  The pt will be able to  an item off the floor without an increase in pain.  PROGRESSING 6/19/2025             Plan  Continue PT - progress SANDRA activities , continue hip hinging, continue gym progression    Treatment Last 4 Visits  Treatment Day: 8       5/2/2025 6/19/2025 6/25/2025 7/11/2025   Spine Treatment   Therapeutic Exercise  - PPU's  - PPU's to end ROM with sag  - prone lying  - hip hinging in tree pose  - hip hinging in tall kneeling  - hip hinging with flat back  - review of SANDRA HEP  - review of Handy method of PT     Neuro Re-Education - TRX  bands - two positions with SA/IO/TA  - lower abd - ball's tucks  - alternating triceps with  Tband  - bear planks  - 1/2 planks  - goblet squat form          Manual Therapy  - central PA's  - lumbar rocking  - STM B QL's - Central PA's L4L5 and L5S1  - lumbar rocking  - STM B QL's    Therapeutic Activity - education on activities to avoid at the gym - education on disc mechanics and healing times  - education on avoid prolonged sitting  - education on avoiding BLT until symptoms calm fown - education on spinal surgery recovery  - education on spinal mechanics - education on gym based program with free weights  Education: Patient was educated on the concept of pain as an output of the brain, including nociception versus pain,inhibition and facilitation, and threat value using metaphors to promote deep learning.    Education: Patient educated on the relationship between emotions and pain, and the role that fear, catastrophization, nociception and threat play in persistent pain, by activating the bodies alarm system, resulting in hypersensitive nerves. Metaphors incorporated to foster deep learning and paradigm shift for patient.     Education  Patient educated on the concept of  the nervous system as the bodies alarm system, and the role of  nociception to warn the body of  danger. Peripheral nerve sensitization, hyperalgesia and allodynia were explained using metaphors to promote deep learning.     Therapeutic Exercise Minutes  15     Neuro Re-Educ Minutes 38      Manual Therapy Minutes  23 38    Therapeutic Activity Minutes 8 10 10    Total Time Of Timed Procedures 46 48 48 0   Total Time Of Service-Based Procedures 0 0 0 0   Total Treatment Time 46 48 48 0   HEP  - PPU's  - PPU's to end ROM with sag  - prone lying          HEP  - PPU's  - PPU's to end ROM with sag  - prone lying    Charges  TE2. TA2

## 2025-07-15 ENCOUNTER — TELEPHONE (OUTPATIENT)
Dept: PHYSICAL THERAPY | Age: 40
End: 2025-07-15

## 2025-07-24 ENCOUNTER — OFFICE VISIT (OUTPATIENT)
Dept: PHYSICAL THERAPY | Facility: HOSPITAL | Age: 40
End: 2025-07-24
Attending: PHYSICAL MEDICINE & REHABILITATION
Payer: COMMERCIAL

## 2025-07-24 PROCEDURE — 97530 THERAPEUTIC ACTIVITIES: CPT | Performed by: PHYSICAL THERAPIST

## 2025-07-24 PROCEDURE — 97112 NEUROMUSCULAR REEDUCATION: CPT | Performed by: PHYSICAL THERAPIST

## 2025-07-24 NOTE — PROGRESS NOTES
Patient: Lorri Gunderson (40 year old, female) Referring Provider:  Insurance:   Diagnosis:   No ref. provider found  [employer] [place] OF [place]   Date of Surgery: No data recorded Next MD visit:  N/A   Precautions:    No data recorded Referral Information:    Date of Evaluation: Req: 0, Auth: 0, Exp:     No data recorded POC Auth Visits:  10       Today's Date   7/24/2025    Subjective  States she always has back pain and stiffness.  States she sits at a 4/10 VAS.  States she is uncomfortable most of her day.  Doesn't think the shot helped that much.       Pain: 4/10     Objective  anterior pelvic tilt in standing with B lower rib flares and R rib hump            Assessment  No adverse effects to treatment.  The pt displays a anterior pelvic tilt in standing with B lower rib flares and R rib hump.  She has less pain and more improvement with her ADT B with pelvic activites on the wall than in 90/90.  Updated her HEP and advised to perform the two standing activities before and after her workouts.  Reviewed appropriate workouts for this point in her recovery.    Goals (to be met in 10 visits)   1.  The pt will be independent in their HEP.  MET 6/19/2025  2.  Centralization of symptoms to the lumbar spine.  PROGRESSING 6/19/2025  3.  The pt will be independent in a modified workout program.  MET 6/19/2025  4.  The pt will be able to return to weight lifting without an increase in pain.  PROGRESSING 6/19/2025  5.  The pt will be able to  an item off the floor without an increase in pain.  PROGRESSING 6/19/2025                 Plan  Continue PT - progress SANDRA activities , continue hip hinging, continue gym progression    Treatment Last 4 Visits  Treatment Day: 9       6/19/2025 6/25/2025 7/11/2025 7/24/2025   Spine Treatment   Therapeutic Exercise - PPU's  - PPU's to end ROM with sag  - prone lying  - hip hinging in tree pose  - hip hinging in tall kneeling  - hip hinging with flat back  - review of SANDRA HEP  -  review of Handy method of PT      Neuro Re-Education    - modified all 4 belly lift - increased pain  - supine 90/90 with hip lift  - standing supported IO/TA  - standing supported wall reach  - standing supported resisted reah - increased tension   Manual Therapy - central PA's  - lumbar rocking  - STM B QL's - Central PA's L4L5 and L5S1  - lumbar rocking  - STM B QL's     Therapeutic Activity - education on disc mechanics and healing times  - education on avoid prolonged sitting  - education on avoiding BLT until symptoms calm fown - education on spinal surgery recovery  - education on spinal mechanics - education on gym based program with free weights  Education: Patient was educated on the concept of pain as an output of the brain, including nociception versus pain,inhibition and facilitation, and threat value using metaphors to promote deep learning.    Education: Patient educated on the relationship between emotions and pain, and the role that fear, catastrophization, nociception and threat play in persistent pain, by activating the bodies alarm system, resulting in hypersensitive nerves. Metaphors incorporated to foster deep learning and paradigm shift for patient.     Education  Patient educated on the concept of  the nervous system as the bodies alarm system, and the role of  nociception to warn the body of  danger. Peripheral nerve sensitization, hyperalgesia and allodynia were explained using metaphors to promote deep learning.   - education on gym based activities - elliptical, stair stepper, machine weights, avoiding incline on the TM   Therapeutic Exercise Minutes 15      Neuro Re-Educ Minutes    38   Manual Therapy Minutes 23 38     Therapeutic Activity Minutes 10 10  8   Total Time Of Timed Procedures 48 48 0 46   Total Time Of Service-Based Procedures 0 0 0 0   Total Treatment Time 48 48 0 46   HEP - PPU's  - PPU's to end ROM with sag  - prone lying   - standing supported IO/TA  - standing  supported wall reach        HEP  - standing supported IO/TA  - standing supported wall reach    Charges  NM3, TA1

## 2025-07-29 ENCOUNTER — OFFICE VISIT (OUTPATIENT)
Dept: PHYSICAL THERAPY | Facility: HOSPITAL | Age: 40
End: 2025-07-29
Attending: PHYSICAL MEDICINE & REHABILITATION
Payer: COMMERCIAL

## 2025-07-29 PROCEDURE — 97530 THERAPEUTIC ACTIVITIES: CPT | Performed by: PHYSICAL THERAPIST

## 2025-07-29 PROCEDURE — 97112 NEUROMUSCULAR REEDUCATION: CPT | Performed by: PHYSICAL THERAPIST

## 2025-08-19 ENCOUNTER — TELEPHONE (OUTPATIENT)
Dept: PHYSICAL MEDICINE AND REHAB | Facility: CLINIC | Age: 40
End: 2025-08-19

## 2025-08-22 ENCOUNTER — OFFICE VISIT (OUTPATIENT)
Dept: PHYSICAL THERAPY | Facility: HOSPITAL | Age: 40
End: 2025-08-22
Attending: PHYSICAL MEDICINE & REHABILITATION

## 2025-08-22 ENCOUNTER — TELEPHONE (OUTPATIENT)
Dept: PHYSICAL MEDICINE AND REHAB | Facility: CLINIC | Age: 40
End: 2025-08-22

## 2025-08-22 PROCEDURE — 97140 MANUAL THERAPY 1/> REGIONS: CPT | Performed by: PHYSICAL THERAPIST

## 2025-08-22 PROCEDURE — 97530 THERAPEUTIC ACTIVITIES: CPT | Performed by: PHYSICAL THERAPIST

## 2025-08-28 ENCOUNTER — OFFICE VISIT (OUTPATIENT)
Dept: PHYSICAL THERAPY | Facility: HOSPITAL | Age: 40
End: 2025-08-28
Attending: PHYSICAL MEDICINE & REHABILITATION

## 2025-08-28 PROCEDURE — 97140 MANUAL THERAPY 1/> REGIONS: CPT | Performed by: PHYSICAL THERAPIST

## 2025-08-28 PROCEDURE — 97530 THERAPEUTIC ACTIVITIES: CPT | Performed by: PHYSICAL THERAPIST

## (undated) NOTE — Clinical Note
Please call 11/12/2020 to set up virtual visit for care gaps/screening labs; pt prefers virtual visit.

## (undated) NOTE — LETTER
12/2/2024      Triston Aguilar MD  Physical Medicine and Rehabilitation  [address] [place] [address], [address]  [facility] [address]  Dept: [phone]  Dept Fax: [phone]        RE: Consultation for Lorri Gunderson        Dear Magy Dorsey MD,    Thank you very much for the opportunity to see your patient.  Attached please find a summary from your patient's recent visit.     I appreciate the chance to take care of your patient with you.  Please feel free to call me with any questions or concerns.    Sincerely,        Triston Aguilar MD  Electronically Signed on 12/2/2024

## (undated) NOTE — LETTER
4/15/2025      Triston Aguilar MD  Physical Medicine and Rehabilitation  [address] [place] [address], [address]  [facility] [address]  Dept: [phone]  Dept Fax: [phone]        RE: Consultation for Lorri Gunderson        Dear Magy Dorsey MD,    Thank you very much for the opportunity to see your patient.  Attached please find a summary from your patient's recent visit.     I appreciate the chance to take care of your patient with you.  Please feel free to call me with any questions or concerns.    Sincerely,        Triston Aguilar MD  Electronically Signed on 4/15/2025

## (undated) NOTE — LETTER
9/30/2024      Triston Aguilar MD  Physical Medicine and Rehabilitation  [address] [place] [address], [address]  [facility] [address]  Dept: [phone]  Dept Fax: [phone]        RE: Consultation for Lorri Gunderson        Dear Magy Dorsey MD,    Thank you very much for the opportunity to see your patient.  Attached please find a summary from your patient's recent visit.     I appreciate the chance to take care of your patient with you.  Please feel free to call me with any questions or concerns.    Sincerely,        Triston Aguilar MD  Electronically Signed on 9/30/2024

## (undated) NOTE — LETTER
3/15/2022              [address]        [address] [address]         Dear Jeremy High,    Our records indicate that the tests ordered for you by Merrill Mcdaniel MD  have not been done. If you have, in fact, already completed the tests or you do not wish to have the tests done, please contact our office at [address] [place] [address]. Otherwise, please proceed with the testing.         Sincerely,    MD Margot Aguiar [address], [address]  [address] [address] [phone]

## (undated) NOTE — LETTER
6/24/2025      Triston Aguilar MD  Physical Medicine and Rehabilitation  [address] [place] [address], [address]  [facility] [address]  Dept: [phone]  Dept Fax: [phone]        RE: Consultation for Lorri Gunderson        Dear Magy Dorsey MD,    Thank you very much for the opportunity to see your patient.  Attached please find a summary from your patient's recent visit.     I appreciate the chance to take care of your patient with you.  Please feel free to call me with any questions or concerns.    Sincerely,        Triston Aguilar MD  Electronically Signed on 6/24/2025